# Patient Record
Sex: MALE | Race: WHITE | Employment: UNEMPLOYED | URBAN - METROPOLITAN AREA
[De-identification: names, ages, dates, MRNs, and addresses within clinical notes are randomized per-mention and may not be internally consistent; named-entity substitution may affect disease eponyms.]

---

## 2018-02-08 ENCOUNTER — HOSPITAL ENCOUNTER (OUTPATIENT)
Dept: OTHER | Age: 38
Discharge: HOME | End: 2018-02-08
Attending: NURSE PRACTITIONER | Admitting: NURSE PRACTITIONER

## 2018-02-08 DIAGNOSIS — R52 PAIN: ICD-10-CM

## 2019-01-25 ENCOUNTER — APPOINTMENT (OUTPATIENT)
Dept: GENERAL RADIOLOGY | Age: 39
End: 2019-01-25

## 2019-01-25 ENCOUNTER — HOSPITAL ENCOUNTER (EMERGENCY)
Age: 39
Discharge: LEFT BEFORE DISCHARGE | End: 2019-01-25
Attending: EMERGENCY MEDICINE

## 2019-01-25 VITALS
DIASTOLIC BLOOD PRESSURE: 72 MMHG | SYSTOLIC BLOOD PRESSURE: 116 MMHG | OXYGEN SATURATION: 100 % | HEART RATE: 65 BPM | TEMPERATURE: 98.5 F | RESPIRATION RATE: 18 BRPM | WEIGHT: 150 LBS | BODY MASS INDEX: 22.81 KG/M2

## 2019-01-25 DIAGNOSIS — T18.9XXA FOREIGN BODY IN DIGESTIVE TRACT, INITIAL ENCOUNTER: Primary | ICD-10-CM

## 2019-01-25 LAB
A/G RATIO: 1.6 (ref 1.1–2.2)
ALBUMIN SERPL-MCNC: 4.5 G/DL (ref 3.4–5)
ALP BLD-CCNC: 82 U/L (ref 40–129)
ALT SERPL-CCNC: 19 U/L (ref 10–40)
ANION GAP SERPL CALCULATED.3IONS-SCNC: 10 MMOL/L (ref 3–16)
AST SERPL-CCNC: 17 U/L (ref 15–37)
BASOPHILS ABSOLUTE: 0.1 K/UL (ref 0–0.2)
BASOPHILS RELATIVE PERCENT: 0.8 %
BILIRUB SERPL-MCNC: 0.5 MG/DL (ref 0–1)
BUN BLDV-MCNC: 12 MG/DL (ref 7–20)
CALCIUM SERPL-MCNC: 9.4 MG/DL (ref 8.3–10.6)
CHLORIDE BLD-SCNC: 105 MMOL/L (ref 99–110)
CO2: 26 MMOL/L (ref 21–32)
CREAT SERPL-MCNC: 1 MG/DL (ref 0.9–1.3)
EKG ATRIAL RATE: 60 BPM
EKG DIAGNOSIS: NORMAL
EKG P AXIS: 83 DEGREES
EKG P-R INTERVAL: 158 MS
EKG Q-T INTERVAL: 420 MS
EKG QRS DURATION: 90 MS
EKG QTC CALCULATION (BAZETT): 420 MS
EKG R AXIS: 42 DEGREES
EKG T AXIS: 75 DEGREES
EKG VENTRICULAR RATE: 60 BPM
EOSINOPHILS ABSOLUTE: 0 K/UL (ref 0–0.6)
EOSINOPHILS RELATIVE PERCENT: 0.6 %
GFR AFRICAN AMERICAN: >60
GFR NON-AFRICAN AMERICAN: >60
GLOBULIN: 2.8 G/DL
GLUCOSE BLD-MCNC: 89 MG/DL (ref 70–99)
HCT VFR BLD CALC: 42.4 % (ref 40.5–52.5)
HEMOGLOBIN: 14.3 G/DL (ref 13.5–17.5)
LYMPHOCYTES ABSOLUTE: 1.8 K/UL (ref 1–5.1)
LYMPHOCYTES RELATIVE PERCENT: 30.1 %
MCH RBC QN AUTO: 30.7 PG (ref 26–34)
MCHC RBC AUTO-ENTMCNC: 33.6 G/DL (ref 31–36)
MCV RBC AUTO: 91.4 FL (ref 80–100)
MONOCYTES ABSOLUTE: 0.4 K/UL (ref 0–1.3)
MONOCYTES RELATIVE PERCENT: 6.3 %
NEUTROPHILS ABSOLUTE: 3.8 K/UL (ref 1.7–7.7)
NEUTROPHILS RELATIVE PERCENT: 62.2 %
PDW BLD-RTO: 14.3 % (ref 12.4–15.4)
PLATELET # BLD: 241 K/UL (ref 135–450)
PMV BLD AUTO: 7.7 FL (ref 5–10.5)
POTASSIUM REFLEX MAGNESIUM: 4.4 MMOL/L (ref 3.5–5.1)
RBC # BLD: 4.65 M/UL (ref 4.2–5.9)
SODIUM BLD-SCNC: 141 MMOL/L (ref 136–145)
TOTAL PROTEIN: 7.3 G/DL (ref 6.4–8.2)
WBC # BLD: 6.1 K/UL (ref 4–11)

## 2019-01-25 PROCEDURE — 99284 EMERGENCY DEPT VISIT MOD MDM: CPT

## 2019-01-25 PROCEDURE — 74022 RADEX COMPL AQT ABD SERIES: CPT

## 2019-01-25 PROCEDURE — 93005 ELECTROCARDIOGRAM TRACING: CPT | Performed by: EMERGENCY MEDICINE

## 2019-01-25 PROCEDURE — 93010 ELECTROCARDIOGRAM REPORT: CPT | Performed by: INTERNAL MEDICINE

## 2019-01-25 PROCEDURE — 85025 COMPLETE CBC W/AUTO DIFF WBC: CPT

## 2019-01-25 PROCEDURE — 80053 COMPREHEN METABOLIC PANEL: CPT

## 2019-01-25 ASSESSMENT — PAIN SCALES - GENERAL: PAINLEVEL_OUTOF10: 4

## 2019-01-25 ASSESSMENT — PAIN DESCRIPTION - LOCATION: LOCATION: RIB CAGE

## 2019-01-25 ASSESSMENT — PAIN DESCRIPTION - ORIENTATION: ORIENTATION: LEFT

## 2019-05-08 ENCOUNTER — APPOINTMENT (OUTPATIENT)
Dept: GENERAL RADIOLOGY | Age: 39
End: 2019-05-08

## 2019-05-08 ENCOUNTER — HOSPITAL ENCOUNTER (EMERGENCY)
Age: 39
Discharge: HOME | End: 2019-05-09
Attending: EMERGENCY MEDICINE

## 2019-05-08 DIAGNOSIS — R07.9 CHEST PAIN, UNSPECIFIED TYPE: Primary | ICD-10-CM

## 2019-05-08 LAB
A/G RATIO: 1.3 (ref 1.1–2.2)
ALBUMIN SERPL-MCNC: 4.3 G/DL (ref 3.4–5)
ALP BLD-CCNC: 90 U/L (ref 40–129)
ALT SERPL-CCNC: 23 U/L (ref 10–40)
ANION GAP SERPL CALCULATED.3IONS-SCNC: 11 MMOL/L (ref 3–16)
AST SERPL-CCNC: 22 U/L (ref 15–37)
BASOPHILS ABSOLUTE: 0.1 K/UL (ref 0–0.2)
BASOPHILS RELATIVE PERCENT: 0.7 %
BILIRUB SERPL-MCNC: 0.4 MG/DL (ref 0–1)
BUN BLDV-MCNC: 16 MG/DL (ref 7–20)
CALCIUM SERPL-MCNC: 9.1 MG/DL (ref 8.3–10.6)
CHLORIDE BLD-SCNC: 103 MMOL/L (ref 99–110)
CO2: 26 MMOL/L (ref 21–32)
CREAT SERPL-MCNC: 0.9 MG/DL (ref 0.9–1.3)
EOSINOPHILS ABSOLUTE: 0.1 K/UL (ref 0–0.6)
EOSINOPHILS RELATIVE PERCENT: 0.7 %
GFR AFRICAN AMERICAN: >60
GFR NON-AFRICAN AMERICAN: >60
GLOBULIN: 3.3 G/DL
GLUCOSE BLD-MCNC: 103 MG/DL (ref 70–99)
HCT VFR BLD CALC: 39.2 % (ref 40.5–52.5)
HEMOGLOBIN: 13.4 G/DL (ref 13.5–17.5)
LYMPHOCYTES ABSOLUTE: 1.8 K/UL (ref 1–5.1)
LYMPHOCYTES RELATIVE PERCENT: 16.5 %
MCH RBC QN AUTO: 31.2 PG (ref 26–34)
MCHC RBC AUTO-ENTMCNC: 34.1 G/DL (ref 31–36)
MCV RBC AUTO: 91.4 FL (ref 80–100)
MONOCYTES ABSOLUTE: 0.8 K/UL (ref 0–1.3)
MONOCYTES RELATIVE PERCENT: 7.8 %
NEUTROPHILS ABSOLUTE: 8 K/UL (ref 1.7–7.7)
NEUTROPHILS RELATIVE PERCENT: 74.3 %
PDW BLD-RTO: 13.6 % (ref 12.4–15.4)
PLATELET # BLD: 389 K/UL (ref 135–450)
PMV BLD AUTO: 7.3 FL (ref 5–10.5)
POTASSIUM SERPL-SCNC: 3.6 MMOL/L (ref 3.5–5.1)
RBC # BLD: 4.29 M/UL (ref 4.2–5.9)
SODIUM BLD-SCNC: 140 MMOL/L (ref 136–145)
TOTAL PROTEIN: 7.6 G/DL (ref 6.4–8.2)
TROPONIN: <0.01 NG/ML
WBC # BLD: 10.8 K/UL (ref 4–11)

## 2019-05-08 PROCEDURE — 36415 COLL VENOUS BLD VENIPUNCTURE: CPT

## 2019-05-08 PROCEDURE — 99285 EMERGENCY DEPT VISIT HI MDM: CPT

## 2019-05-08 PROCEDURE — 80053 COMPREHEN METABOLIC PANEL: CPT

## 2019-05-08 PROCEDURE — 93005 ELECTROCARDIOGRAM TRACING: CPT | Performed by: EMERGENCY MEDICINE

## 2019-05-08 PROCEDURE — G0480 DRUG TEST DEF 1-7 CLASSES: HCPCS

## 2019-05-08 PROCEDURE — 85025 COMPLETE CBC W/AUTO DIFF WBC: CPT

## 2019-05-08 PROCEDURE — 84484 ASSAY OF TROPONIN QUANT: CPT

## 2019-05-08 PROCEDURE — 71045 X-RAY EXAM CHEST 1 VIEW: CPT

## 2019-05-08 ASSESSMENT — PAIN SCALES - GENERAL: PAINLEVEL_OUTOF10: 5

## 2019-05-08 ASSESSMENT — PAIN DESCRIPTION - LOCATION: LOCATION: CHEST

## 2019-05-08 ASSESSMENT — PAIN DESCRIPTION - PAIN TYPE: TYPE: ACUTE PAIN

## 2019-05-09 ENCOUNTER — APPOINTMENT (OUTPATIENT)
Dept: CT IMAGING | Age: 39
End: 2019-05-09

## 2019-05-09 VITALS
HEIGHT: 69 IN | HEART RATE: 75 BPM | BODY MASS INDEX: 22.22 KG/M2 | WEIGHT: 150 LBS | RESPIRATION RATE: 14 BRPM | TEMPERATURE: 98.7 F | OXYGEN SATURATION: 99 % | SYSTOLIC BLOOD PRESSURE: 123 MMHG | DIASTOLIC BLOOD PRESSURE: 91 MMHG

## 2019-05-09 LAB
AMPHETAMINE SCREEN, URINE: POSITIVE
BARBITURATE SCREEN URINE: ABNORMAL
BENZODIAZEPINE SCREEN, URINE: ABNORMAL
CANNABINOID SCREEN URINE: ABNORMAL
COCAINE METABOLITE SCREEN URINE: ABNORMAL
EKG ATRIAL RATE: 84 BPM
EKG DIAGNOSIS: NORMAL
EKG P AXIS: 75 DEGREES
EKG P-R INTERVAL: 148 MS
EKG Q-T INTERVAL: 382 MS
EKG QRS DURATION: 92 MS
EKG QTC CALCULATION (BAZETT): 451 MS
EKG R AXIS: 38 DEGREES
EKG T AXIS: 67 DEGREES
EKG VENTRICULAR RATE: 84 BPM
ETHANOL: NORMAL MG/DL (ref 0–0.08)
Lab: ABNORMAL
METHADONE SCREEN, URINE: ABNORMAL
OPIATE SCREEN URINE: ABNORMAL
OXYCODONE URINE: ABNORMAL
PH UA: 7
PHENCYCLIDINE SCREEN URINE: ABNORMAL
PROPOXYPHENE SCREEN: ABNORMAL

## 2019-05-09 PROCEDURE — 80307 DRUG TEST PRSMV CHEM ANLYZR: CPT

## 2019-05-09 PROCEDURE — 6370000000 HC RX 637 (ALT 250 FOR IP)

## 2019-05-09 PROCEDURE — 93010 ELECTROCARDIOGRAM REPORT: CPT | Performed by: INTERNAL MEDICINE

## 2019-05-09 PROCEDURE — 96374 THER/PROPH/DIAG INJ IV PUSH: CPT

## 2019-05-09 PROCEDURE — 70450 CT HEAD/BRAIN W/O DYE: CPT

## 2019-05-09 PROCEDURE — 6360000002 HC RX W HCPCS: Performed by: EMERGENCY MEDICINE

## 2019-05-09 RX ORDER — NALOXONE HYDROCHLORIDE 1 MG/ML
1 INJECTION INTRAMUSCULAR; INTRAVENOUS; SUBCUTANEOUS ONCE
Status: DISCONTINUED | OUTPATIENT
Start: 2019-05-09 | End: 2019-05-09

## 2019-05-09 RX ORDER — NALOXONE HYDROCHLORIDE 1 MG/ML
1 INJECTION INTRAMUSCULAR; INTRAVENOUS; SUBCUTANEOUS ONCE
Status: COMPLETED | OUTPATIENT
Start: 2019-05-09 | End: 2019-05-09

## 2019-05-09 RX ORDER — NALOXONE HYDROCHLORIDE 1 MG/ML
INJECTION INTRAMUSCULAR; INTRAVENOUS; SUBCUTANEOUS
Status: DISCONTINUED
Start: 2019-05-09 | End: 2019-05-09

## 2019-05-09 RX ORDER — AMMONIA INHALANTS 0.04 G/.3ML
0.3 INHALANT RESPIRATORY (INHALATION) ONCE
Status: COMPLETED | OUTPATIENT
Start: 2019-05-09 | End: 2019-05-09

## 2019-05-09 RX ORDER — AMMONIA INHALANTS 0.04 G/.3ML
INHALANT RESPIRATORY (INHALATION)
Status: COMPLETED
Start: 2019-05-09 | End: 2019-05-09

## 2019-05-09 RX ADMIN — NALOXONE HYDROCHLORIDE 1 MG: 1 INJECTION PARENTERAL at 00:52

## 2019-05-09 RX ADMIN — Medication 0.3 ML: at 04:22

## 2019-05-09 RX ADMIN — AMMONIA INHALANTS 0.3 ML: 0.04 INHALANT RESPIRATORY (INHALATION) at 04:22

## 2019-05-09 NOTE — ED NOTES
Patient sleeping. Ask how he is getting home, patient reports kids will come get him. Asked for a number patient closed his eyes. Patient mumbles answer and closes eyes.       [facility]nn Fel[facility]  05/09/19 0610

## 2019-05-09 NOTE — ED PROVIDER NOTES
eMERGENCY dEPARTMENT eNCOUnter        Pt Name: Elmo Samaniego  MRN: [id number]  [place] 1980  Date of evaluation: 5/8/2019  Provider: JORDY Sosa  PCP: Paul Swain MD  ED Attending: Agustina Carey., MD    History provided by the patient. CHIEF COMPLAINT:     Chief Complaint   Patient presents with    Chest Pain     starting tonight, states fell 3 days ago off 4 foot ladder and now has bad headache     Headache       HISTORY OF PRESENT ILLNESS:      Elmo Samaniego is a 34 y.o. male who presents [address] [facility]  ED with complaints of chest pain or headache. Patient states that his chest pain started yesterday and has been persistent. Patient also states that he fell and hit his head and is having a headache. Patient denies any nausea or vomiting. Patient is lethargic, he has normal vital signs, no hypoxia. He denies any illicit drug use, denies alcohol use. He denies any shortness of breath. Denies abdominal pain, nausea or vomiting. He does not appear to be acutely distressed. Denies any cardiac history. He is here for further evaluation. Nursing Notes were reviewed     REVIEW OF SYSTEMS:     Review of Systems  All systems, atotal of 10, are reviewed and negative except for those that were just noted in history present illness. PAST MEDICAL HISTORY:     Past Medical History:   Diagnosis Date    Back pain     GERD (gastroesophageal reflux disease)     Hepatitis C     Wears glasses          SURGICAL HISTORY:      Past Surgical History:   Procedure Laterality Date    APPENDECTOMY      SHOULDER SURGERY Left 4/1/14    UPPER GASTROINTESTINAL ENDOSCOPY  2014         CURRENT MEDICATIONS:       Previous Medications    No medications on file         ALLERGIES:    Patient has no known allergies.     FAMILY HISTORY:       Family History   Problem Relation Age of Onset    Cancer Mother     Heart Disease Father HENT: Normocephalic. Atraumatic. External ears normal, without discharge. TMs clear bilaterally. No nasal discharge. Oropharynx clear, no erythema. Mucous membranes moist.  EYES: Conjunctiva non-injected, no lid abnormalities noted. No scleral icterus. PERRL. EOM's grossly intact. Anterior chambers clear. NECK: Supple. Normal ROM. No meningismus. No thyroid tenderness or swelling noted. CARDIOVASCULAR: RRR. No Murmer. PULMONARY/CHEST WALL: Effort normal. No tachypnea. Lungs clear to ausculation. ABDOMEN: Normal BS. Soft. Nondistended. No tenderness to palpate. No guarding. No hernias noted. No splenomegaly. /ANORECTAL: Not assessed  MUSKULOSKELETAL: Normal ROM. No acute deformities. No edema. No tenderness to palpate. SKIN: Warm and dry. NEUROLOGICAL:  GCS 15. CN II-XII grossly intact. Strength is 5/5 in allextremities and sensation is intact. PSYCHIATRIC: lethargic but easily arousable. DIAGNOSTIC RESULTS:     LABS:    Results for orders placed or performed during the hospital encounter of 05/08/19   CBC Auto Differential   Result Value Ref Range    WBC 10.8 4.0 - 11.0 K/uL    RBC 4.29 4. 20 - 5.90 M/uL    Hemoglobin 13.4 (L) 13.5 - 17.5 g/dL    Hematocrit 39.2 (L) 40.5 - 52.5 %    MCV 91.4 80.0 - 100.0 fL    MCH 31.2 26.0 - 34.0 pg    MCHC 34.1 31.0 - 36.0 g/dL    RDW 13.6 12.4 - 15.4 %    Platelets 264 164 - 461 K/uL    MPV 7.3 5.0 - 10.5 fL    Neutrophils % 74.3 %    Lymphocytes % 16.5 %    Monocytes % 7.8 %    Eosinophils % 0.7 %    Basophils % 0.7 %    Neutrophils # 8.0 (H) 1.7 - 7.7 K/uL    Lymphocytes # 1.8 1.0 - 5.1 K/uL    Monocytes # 0.8 0.0 - 1.3 K/uL    Eosinophils # 0.1 0.0 - 0.6 K/uL    Basophils # 0.1 0.0 - 0.2 K/uL   Comprehensive Metabolic Panel   Result Value Ref Range    Sodium 140 136 - 145 mmol/L    Potassium 3.6 3.5 - 5.1 mmol/L    Chloride 103 99 - 110 mmol/L    CO2 26 21 - 32 mmol/L    Anion Gap 11 3 - 16    Glucose 103 (H) 70 - 99 mg/dL    BUN 16 7 - 20 mg/dL CREATININE 0.9 0.9 - 1.3 mg/dL    GFR Non-African American >60 >60    GFR African American >60 >60    Calcium 9.1 8.3 - 10.6 mg/dL    Total Protein 7.6 6.4 - 8.2 g/dL    Alb 4.3 3.4 - 5.0 g/dL    Albumin/Globulin Ratio 1.3 1.1 - 2.2    Total Bilirubin 0.4 0.0 - 1.0 mg/dL    Alkaline Phosphatase 90 40 - 129 U/L    ALT 23 10 - 40 U/L    AST 22 15 - 37 U/L    Globulin 3.3 g/dL   Troponin   Result Value Ref Range    Troponin <0.01 <0.01 ng/mL   Ethanol   Result Value Ref Range    Ethanol Lvl None Detected mg/dL         RADIOLOGY:  All x-ray studies are viewed/reviewedby me. Formal interpretations per the radiologist are as follows:      CT HEAD WO CONTRAST   Final Result   No acute intracranial abnormality. XR CHEST PORTABLE   Final Result   Negative portable chest.                 EKG:  See EKG interpretation by an attending phsyician      PROCEDURES:   N/A    CRITICAL CARE TIME:   N/A    CONSULTS:  None      EMERGENCYDEPARTMENT COURSE and DIFFERENTIAL DIAGNOSIS/MDM:   Vitals:    Vitals:    05/08/19 2333 05/09/19 0005 05/09/19 0036 05/09/19 0053   BP:   134/89 134/89   Pulse: 76 67 73 90   Resp: 19 20 19 18   Temp:       TempSrc:       SpO2: 97% 99% 100% 100%   Weight:       Height:           Patient was given the following medications:  Medications   naloxone (NARCAN) injection 1 mg (1 mg Intravenous Given 5/9/19 0052)         Patient was evaluated by both myself and Lennox Lepe MD.   ED Course as of May 09 0228   Wed May 08, 2019   2350 he was evaluated at the bedside. Patient was asleep, he is difficult to arouse, oriented to arouse him he is not communicating well. Pulse treatment since. Patient denies any drug use or alcohol abuse. Patient states that he has chest pain that started last night. Patient again however falls asleep mid sentence. Patient does appear somewhat diaphoretic. Patient laboratory studies showed normal white blood cell count elevation, troponin pending.   The CT and a urine drug screen as well as an EtOH to try to explain patient's presentation. I will continue to monitor.    [RH]   u May 09, 2019   0051 Nurse wanted to give the patient narcan however there is no medical reason for the patient to need it. He is drowsy but arousable. He has perfect vital signs and has no hypoxia. Patient nurse then went to attending physician because she wanted to give narcan and attending physician gave the order. I do not feel this is necessary at this time. [RH]   0148 Patient CT negative, EtOH negative. Labs otherwise unremarkable. Vital signs are stable, so no indications for further evaluation of this patient. He was discharged home. He can return the ED for any worsening symptoms. I saw no indications of an acute emergent medical condition at this time    [RH]      ED Course User Index  [RH] iSngh Machado, APRN - CNP        Patient laboratory studies, radiographic imaging, andassessment were all discussed with the patient and/or patient family. There was shared decision-making between myself, the attending physician, as well as the patient and/or their surrogate and we are all in agreement with discharge home. There was an opportunity for questions and all questions were answered to the best of my ability and to the satisfaction of the patient and/or patient family. I estimate there is LOW risk for PULMONARY EMBOLISM, ACUTE CORONARY SYNDROME, OR THORACIC AORTIC DISSECTION, thus I consider the discharge disposition reasonable. Lorenzo Momin and I have discussed the diagnosis and risks, and we agree with discharging home to follow-up with their primary doctor. We also discussed returning to the Emergency Department immediately if new or worsening symptoms occur. We have discussed the symptoms which are most concerning (e.g., bloody sputum, fever, worsening pain or shortness of breath, vomiting) that necessitate immediate return. FINAL IMPRESSION:      1.  Chest pain, unspecified type          DISPOSITION/PLAN:   DISPOSITIONDecision To Discharge      PATIENT REFERRED TO:  Ricky Bone MD  [address] [facility] [address] [phone]    Call   For follow up      DISCHARGE MEDICATIONS:  New Prescriptions    No medications on file                  (Please note that portions of this note were completed with a voice recognition program.  Efforts were made to edit the dictations, but occasionally words are mis-transcribed.)    JORDY Carter CNP-C (electronically signed)        JORDY Carter CNP  05/09/19 7669

## 2019-05-09 NOTE — ED NOTES
Oumou Hewitt MD verbal order to administer Narcan to patient.       Sharmaine Grey, [facility]  05/09/19 2117

## 2019-05-09 NOTE — ED NOTES
[facility] Department picked patient up due to several outstanding warrents. Patient was read his rights and placed under arrest. Patient was alert and oriented. Patient was allowed to dress and urinate at bedside. Patient then was handcuffed and lead to the cruiser.       Robina Coulter, [address] [place] [address]  05/09/19 1139

## 2019-05-09 NOTE — ED NOTES
Patient sleeping, woke patient and asked again for urine for UA.  Patient has bedside urinal in bed with him     Sandra Church, [facility]  05/09/19 8422

## 2019-05-09 NOTE — ED PROVIDER NOTES
Patient initially seen by Annmarie BARRIOS in PE and I was involved in all diagnostic treatment and disposition decisions and did an independent exam.  Patient presents complaining of chest pain. He states that he was turning himself in for heroin use and fell out of the car tonight bumping his head. He did not have loss of consciousness and has abrasions on his forehead. He does not have a history of heart disease and is not diabetic or hypercholesterolemic. He does not have a history of hypertension but does smoke cigarettes. His never been to treatment for heroin use but has been to Narcotics Anonymous in the past but did not get a sponsor work 12 step program.  His EKG did not show any acute changes his labs were not diagnostically abnormal and his head CT and chest x-ray were negative. He'll be treated as an outpatient we will follow him up with cardiology for consideration of a stress test and cardiac echo. I strongly encouraged him to follow-up with Narcotics Anonymous and the interim he is to return to the emergency department if problems develop like worsening chest pain shortness of breath or other concerning symptoms.     EKG: EKG demonstrates normal sinus rhythm with normal axis normal intervals normal ST-T wave segments and no evidence of ischemia infarction or arrhythmia heart rate of 84 and much like his previous EKG     Myriam Walsh MD  05/09/19 8335

## 2019-05-09 NOTE — ED NOTES
Patient sleeping in bed. Woke patient and asked if having any pain at this time. Patient denies pain. Asked for urine for UA and Patient attempting to comply with request at this time.       Alphonso Fenton RN  05/09/19 0025

## 2019-05-09 NOTE — ED NOTES
Spoke to [place], [place] in department who discontinued Narcan.      [facility]  05/09/19 6228

## 2019-05-09 NOTE — ED NOTES
Patient unable to stay awake. Provider verbal order of ammonia inhaler.       Una Montesinos [facility]  05/09/19 0355

## 2019-12-05 ENCOUNTER — APPOINTMENT (OUTPATIENT)
Dept: GENERAL RADIOLOGY | Age: 39
DRG: 917 | End: 2019-12-05
Payer: COMMERCIAL

## 2019-12-05 ENCOUNTER — HOSPITAL ENCOUNTER (INPATIENT)
Age: 39
LOS: 2 days | Discharge: PSYCHIATRIC OR SUBSTANCE USE FACILITY | DRG: 917 | End: 2019-12-07
Attending: EMERGENCY MEDICINE | Admitting: INTERNAL MEDICINE
Payer: COMMERCIAL

## 2019-12-05 ENCOUNTER — APPOINTMENT (OUTPATIENT)
Dept: CT IMAGING | Age: 39
DRG: 917 | End: 2019-12-05
Payer: COMMERCIAL

## 2019-12-05 PROBLEM — J96.00 ACUTE RESPIRATORY FAILURE (HCC): Status: ACTIVE | Noted: 2019-12-05

## 2019-12-05 PROBLEM — F19.90 IVDU (INTRAVENOUS DRUG USER): Status: ACTIVE | Noted: 2019-12-05

## 2019-12-05 PROBLEM — G93.41 ACUTE METABOLIC ENCEPHALOPATHY: Status: ACTIVE | Noted: 2019-12-05

## 2019-12-05 LAB
A/G RATIO: 1.3 (ref 1.1–2.2)
ACETAMINOPHEN LEVEL: 10 UG/ML (ref 10–30)
ACETAMINOPHEN LEVEL: <5 UG/ML (ref 10–30)
ALBUMIN SERPL-MCNC: 4.8 G/DL (ref 3.4–5)
ALP BLD-CCNC: 80 U/L (ref 40–129)
ALT SERPL-CCNC: 34 U/L (ref 10–40)
AMPHETAMINE SCREEN, URINE: POSITIVE
ANION GAP SERPL CALCULATED.3IONS-SCNC: 14 MMOL/L (ref 3–16)
ANION GAP SERPL CALCULATED.3IONS-SCNC: 7 MMOL/L (ref 3–16)
ANION GAP SERPL CALCULATED.3IONS-SCNC: 8 MMOL/L (ref 3–16)
AST SERPL-CCNC: 36 U/L (ref 15–37)
BARBITURATE SCREEN URINE: ABNORMAL
BASE EXCESS ARTERIAL: 1.5 MMOL/L (ref -3–3)
BASOPHILS ABSOLUTE: 0.1 K/UL (ref 0–0.2)
BASOPHILS RELATIVE PERCENT: 0.6 %
BENZODIAZEPINE SCREEN, URINE: ABNORMAL
BILIRUB SERPL-MCNC: 0.5 MG/DL (ref 0–1)
BILIRUBIN URINE: NEGATIVE
BLOOD, URINE: NEGATIVE
BUN BLDV-MCNC: 13 MG/DL (ref 7–20)
BUN BLDV-MCNC: 14 MG/DL (ref 7–20)
BUN BLDV-MCNC: 14 MG/DL (ref 7–20)
CALCIUM SERPL-MCNC: 8.5 MG/DL (ref 8.3–10.6)
CALCIUM SERPL-MCNC: 8.6 MG/DL (ref 8.3–10.6)
CALCIUM SERPL-MCNC: 9.7 MG/DL (ref 8.3–10.6)
CANNABINOID SCREEN URINE: POSITIVE
CARBOXYHEMOGLOBIN ARTERIAL: 3.1 % (ref 0–1.5)
CHLORIDE BLD-SCNC: 102 MMOL/L (ref 99–110)
CHLORIDE BLD-SCNC: 109 MMOL/L (ref 99–110)
CHLORIDE BLD-SCNC: 110 MMOL/L (ref 99–110)
CLARITY: CLEAR
CO2: 24 MMOL/L (ref 21–32)
COCAINE METABOLITE SCREEN URINE: ABNORMAL
COLOR: YELLOW
CREAT SERPL-MCNC: 1 MG/DL (ref 0.9–1.3)
CREAT SERPL-MCNC: 1.2 MG/DL (ref 0.9–1.3)
CREAT SERPL-MCNC: 1.2 MG/DL (ref 0.9–1.3)
EKG ATRIAL RATE: 80 BPM
EKG ATRIAL RATE: 97 BPM
EKG DIAGNOSIS: NORMAL
EKG DIAGNOSIS: NORMAL
EKG P AXIS: 73 DEGREES
EKG P AXIS: 76 DEGREES
EKG P-R INTERVAL: 142 MS
EKG P-R INTERVAL: 164 MS
EKG Q-T INTERVAL: 406 MS
EKG Q-T INTERVAL: 428 MS
EKG QRS DURATION: 90 MS
EKG QRS DURATION: 94 MS
EKG QTC CALCULATION (BAZETT): 493 MS
EKG QTC CALCULATION (BAZETT): 515 MS
EKG R AXIS: 40 DEGREES
EKG R AXIS: 7 DEGREES
EKG T AXIS: 101 DEGREES
EKG T AXIS: 68 DEGREES
EKG VENTRICULAR RATE: 80 BPM
EKG VENTRICULAR RATE: 97 BPM
EOSINOPHILS ABSOLUTE: 0.1 K/UL (ref 0–0.6)
EOSINOPHILS RELATIVE PERCENT: 1 %
ETHANOL: NORMAL MG/DL (ref 0–0.08)
GFR AFRICAN AMERICAN: >60
GFR NON-AFRICAN AMERICAN: >60
GLOBULIN: 3.7 G/DL
GLUCOSE BLD-MCNC: 100 MG/DL (ref 70–99)
GLUCOSE BLD-MCNC: 76 MG/DL (ref 70–99)
GLUCOSE BLD-MCNC: 89 MG/DL (ref 70–99)
GLUCOSE URINE: NEGATIVE MG/DL
HCO3 ARTERIAL: 24.6 MMOL/L (ref 21–29)
HCT VFR BLD CALC: 50.8 % (ref 40.5–52.5)
HEMOGLOBIN, ART, EXTENDED: 15 G/DL (ref 13.5–17.5)
HEMOGLOBIN: 16.7 G/DL (ref 13.5–17.5)
KETONES, URINE: NEGATIVE MG/DL
LEUKOCYTE ESTERASE, URINE: NEGATIVE
LYMPHOCYTES ABSOLUTE: 2 K/UL (ref 1–5.1)
LYMPHOCYTES RELATIVE PERCENT: 16 %
Lab: ABNORMAL
MAGNESIUM: 2.3 MG/DL (ref 1.8–2.4)
MCH RBC QN AUTO: 31.4 PG (ref 26–34)
MCHC RBC AUTO-ENTMCNC: 32.8 G/DL (ref 31–36)
MCV RBC AUTO: 95.8 FL (ref 80–100)
METHADONE SCREEN, URINE: ABNORMAL
METHEMOGLOBIN ARTERIAL: 0.2 %
MICROSCOPIC EXAMINATION: NORMAL
MONOCYTES ABSOLUTE: 0.7 K/UL (ref 0–1.3)
MONOCYTES RELATIVE PERCENT: 5.9 %
NEUTROPHILS ABSOLUTE: 9.7 K/UL (ref 1.7–7.7)
NEUTROPHILS RELATIVE PERCENT: 76.5 %
NITRITE, URINE: NEGATIVE
O2 CONTENT ARTERIAL: 21 ML/DL
O2 SAT, ARTERIAL: 98.4 %
O2 THERAPY: ABNORMAL
OPIATE SCREEN URINE: ABNORMAL
OXYCODONE URINE: ABNORMAL
PCO2 ARTERIAL: 34.7 MMHG (ref 35–45)
PDW BLD-RTO: 13.1 % (ref 12.4–15.4)
PH ARTERIAL: 7.47 (ref 7.35–7.45)
PH UA: 7.5
PH UA: 7.5 (ref 5–8)
PHENCYCLIDINE SCREEN URINE: ABNORMAL
PLATELET # BLD: 288 K/UL (ref 135–450)
PMV BLD AUTO: 8.2 FL (ref 5–10.5)
PO2 ARTERIAL: 111.7 MMHG (ref 75–108)
POTASSIUM REFLEX MAGNESIUM: 3.4 MMOL/L (ref 3.5–5.1)
POTASSIUM SERPL-SCNC: 4.6 MMOL/L (ref 3.5–5.1)
POTASSIUM SERPL-SCNC: 5 MMOL/L (ref 3.5–5.1)
PROPOXYPHENE SCREEN: ABNORMAL
PROTEIN UA: NEGATIVE MG/DL
RBC # BLD: 5.3 M/UL (ref 4.2–5.9)
SALICYLATE, SERUM: 4.3 MG/DL (ref 15–30)
SALICYLATE, SERUM: 9.1 MG/DL (ref 15–30)
SODIUM BLD-SCNC: 140 MMOL/L (ref 136–145)
SODIUM BLD-SCNC: 141 MMOL/L (ref 136–145)
SODIUM BLD-SCNC: 141 MMOL/L (ref 136–145)
SPECIFIC GRAVITY UA: 1.01 (ref 1–1.03)
TCO2 ARTERIAL: 25.7 MMOL/L
TOTAL CK: 206 U/L (ref 39–308)
TOTAL CK: 326 U/L (ref 39–308)
TOTAL PROTEIN: 8.5 G/DL (ref 6.4–8.2)
TROPONIN: 0.02 NG/ML
URINE TYPE: NORMAL
UROBILINOGEN, URINE: 0.2 E.U./DL
WBC # BLD: 12.6 K/UL (ref 4–11)

## 2019-12-05 PROCEDURE — 2500000003 HC RX 250 WO HCPCS: Performed by: INTERNAL MEDICINE

## 2019-12-05 PROCEDURE — 72170 X-RAY EXAM OF PELVIS: CPT

## 2019-12-05 PROCEDURE — G0480 DRUG TEST DEF 1-7 CLASSES: HCPCS

## 2019-12-05 PROCEDURE — 96365 THER/PROPH/DIAG IV INF INIT: CPT

## 2019-12-05 PROCEDURE — 2580000003 HC RX 258: Performed by: INTERNAL MEDICINE

## 2019-12-05 PROCEDURE — 82803 BLOOD GASES ANY COMBINATION: CPT

## 2019-12-05 PROCEDURE — 87086 URINE CULTURE/COLONY COUNT: CPT

## 2019-12-05 PROCEDURE — 80307 DRUG TEST PRSMV CHEM ANLYZR: CPT

## 2019-12-05 PROCEDURE — 96375 TX/PRO/DX INJ NEW DRUG ADDON: CPT

## 2019-12-05 PROCEDURE — 85025 COMPLETE CBC W/AUTO DIFF WBC: CPT

## 2019-12-05 PROCEDURE — 94750 HC PULMONARY COMPLIANCE STUDY: CPT

## 2019-12-05 PROCEDURE — 93010 ELECTROCARDIOGRAM REPORT: CPT | Performed by: INTERNAL MEDICINE

## 2019-12-05 PROCEDURE — 96361 HYDRATE IV INFUSION ADD-ON: CPT

## 2019-12-05 PROCEDURE — 2000000000 HC ICU R&B

## 2019-12-05 PROCEDURE — 82550 ASSAY OF CK (CPK): CPT

## 2019-12-05 PROCEDURE — 71045 X-RAY EXAM CHEST 1 VIEW: CPT

## 2019-12-05 PROCEDURE — 36415 COLL VENOUS BLD VENIPUNCTURE: CPT

## 2019-12-05 PROCEDURE — 2580000003 HC RX 258: Performed by: EMERGENCY MEDICINE

## 2019-12-05 PROCEDURE — 83735 ASSAY OF MAGNESIUM: CPT

## 2019-12-05 PROCEDURE — 80053 COMPREHEN METABOLIC PANEL: CPT

## 2019-12-05 PROCEDURE — 6370000000 HC RX 637 (ALT 250 FOR IP): Performed by: INTERNAL MEDICINE

## 2019-12-05 PROCEDURE — 2500000003 HC RX 250 WO HCPCS

## 2019-12-05 PROCEDURE — 6360000002 HC RX W HCPCS: Performed by: EMERGENCY MEDICINE

## 2019-12-05 PROCEDURE — 36592 COLLECT BLOOD FROM PICC: CPT

## 2019-12-05 PROCEDURE — 6360000002 HC RX W HCPCS: Performed by: INTERNAL MEDICINE

## 2019-12-05 PROCEDURE — 2500000003 HC RX 250 WO HCPCS: Performed by: EMERGENCY MEDICINE

## 2019-12-05 PROCEDURE — 99291 CRITICAL CARE FIRST HOUR: CPT | Performed by: INTERNAL MEDICINE

## 2019-12-05 PROCEDURE — 94002 VENT MGMT INPAT INIT DAY: CPT

## 2019-12-05 PROCEDURE — 70450 CT HEAD/BRAIN W/O DYE: CPT

## 2019-12-05 PROCEDURE — 93005 ELECTROCARDIOGRAM TRACING: CPT | Performed by: EMERGENCY MEDICINE

## 2019-12-05 PROCEDURE — 93005 ELECTROCARDIOGRAM TRACING: CPT | Performed by: INTERNAL MEDICINE

## 2019-12-05 PROCEDURE — 6360000002 HC RX W HCPCS

## 2019-12-05 PROCEDURE — 99291 CRITICAL CARE FIRST HOUR: CPT

## 2019-12-05 PROCEDURE — 02HV33Z INSERTION OF INFUSION DEVICE INTO SUPERIOR VENA CAVA, PERCUTANEOUS APPROACH: ICD-10-PCS | Performed by: EMERGENCY MEDICINE

## 2019-12-05 PROCEDURE — 0BH17EZ INSERTION OF ENDOTRACHEAL AIRWAY INTO TRACHEA, VIA NATURAL OR ARTIFICIAL OPENING: ICD-10-PCS | Performed by: INTERNAL MEDICINE

## 2019-12-05 PROCEDURE — 81003 URINALYSIS AUTO W/O SCOPE: CPT

## 2019-12-05 PROCEDURE — 99292 CRITICAL CARE ADDL 30 MIN: CPT

## 2019-12-05 PROCEDURE — 84484 ASSAY OF TROPONIN QUANT: CPT

## 2019-12-05 PROCEDURE — 5A1945Z RESPIRATORY VENTILATION, 24-96 CONSECUTIVE HOURS: ICD-10-PCS | Performed by: INTERNAL MEDICINE

## 2019-12-05 RX ORDER — MIDAZOLAM HYDROCHLORIDE 1 MG/ML
2 INJECTION INTRAMUSCULAR; INTRAVENOUS
Status: DISCONTINUED | OUTPATIENT
Start: 2019-12-05 | End: 2019-12-06

## 2019-12-05 RX ORDER — LORAZEPAM 2 MG/ML
2 INJECTION INTRAMUSCULAR ONCE
Status: COMPLETED | OUTPATIENT
Start: 2019-12-05 | End: 2019-12-05

## 2019-12-05 RX ORDER — FAMOTIDINE 20 MG/1
20 TABLET, FILM COATED ORAL 2 TIMES DAILY
Status: DISCONTINUED | OUTPATIENT
Start: 2019-12-05 | End: 2019-12-05

## 2019-12-05 RX ORDER — ROCURONIUM BROMIDE 10 MG/ML
80 INJECTION, SOLUTION INTRAVENOUS ONCE
Status: COMPLETED | OUTPATIENT
Start: 2019-12-05 | End: 2019-12-05

## 2019-12-05 RX ORDER — POTASSIUM CHLORIDE 750 MG/1
40 TABLET, EXTENDED RELEASE ORAL PRN
Status: DISCONTINUED | OUTPATIENT
Start: 2019-12-05 | End: 2019-12-07 | Stop reason: HOSPADM

## 2019-12-05 RX ORDER — CHLORHEXIDINE GLUCONATE 0.12 MG/ML
15 RINSE ORAL 2 TIMES DAILY
Status: DISCONTINUED | OUTPATIENT
Start: 2019-12-05 | End: 2019-12-06

## 2019-12-05 RX ORDER — PROPOFOL 10 MG/ML
10 INJECTION, EMULSION INTRAVENOUS ONCE
Status: COMPLETED | OUTPATIENT
Start: 2019-12-05 | End: 2019-12-05

## 2019-12-05 RX ORDER — 0.9 % SODIUM CHLORIDE 0.9 %
1000 INTRAVENOUS SOLUTION INTRAVENOUS ONCE
Status: COMPLETED | OUTPATIENT
Start: 2019-12-05 | End: 2019-12-05

## 2019-12-05 RX ORDER — PROPOFOL 10 MG/ML
INJECTION, EMULSION INTRAVENOUS
Status: DISPENSED
Start: 2019-12-05 | End: 2019-12-05

## 2019-12-05 RX ORDER — SODIUM CHLORIDE 0.9 % (FLUSH) 0.9 %
10 SYRINGE (ML) INJECTION EVERY 12 HOURS SCHEDULED
Status: DISCONTINUED | OUTPATIENT
Start: 2019-12-05 | End: 2019-12-07 | Stop reason: HOSPADM

## 2019-12-05 RX ORDER — FENTANYL CITRATE 50 UG/ML
50 INJECTION, SOLUTION INTRAMUSCULAR; INTRAVENOUS
Status: DISCONTINUED | OUTPATIENT
Start: 2019-12-05 | End: 2019-12-06

## 2019-12-05 RX ORDER — SODIUM CHLORIDE 0.9 % (FLUSH) 0.9 %
10 SYRINGE (ML) INJECTION PRN
Status: DISCONTINUED | OUTPATIENT
Start: 2019-12-05 | End: 2019-12-07 | Stop reason: HOSPADM

## 2019-12-05 RX ORDER — ETOMIDATE 2 MG/ML
20 INJECTION INTRAVENOUS ONCE
Status: COMPLETED | OUTPATIENT
Start: 2019-12-05 | End: 2019-12-05

## 2019-12-05 RX ORDER — ACETAMINOPHEN 325 MG/1
650 TABLET ORAL EVERY 4 HOURS PRN
Status: DISCONTINUED | OUTPATIENT
Start: 2019-12-05 | End: 2019-12-07 | Stop reason: HOSPADM

## 2019-12-05 RX ORDER — ONDANSETRON 2 MG/ML
4 INJECTION INTRAMUSCULAR; INTRAVENOUS EVERY 6 HOURS PRN
Status: DISCONTINUED | OUTPATIENT
Start: 2019-12-05 | End: 2019-12-06

## 2019-12-05 RX ORDER — SODIUM CHLORIDE 9 MG/ML
INJECTION, SOLUTION INTRAVENOUS CONTINUOUS
Status: DISCONTINUED | OUTPATIENT
Start: 2019-12-05 | End: 2019-12-07 | Stop reason: HOSPADM

## 2019-12-05 RX ORDER — FENTANYL CITRATE 50 UG/ML
25 INJECTION, SOLUTION INTRAMUSCULAR; INTRAVENOUS
Status: DISCONTINUED | OUTPATIENT
Start: 2019-12-05 | End: 2019-12-05

## 2019-12-05 RX ORDER — MIDAZOLAM HYDROCHLORIDE 1 MG/ML
INJECTION INTRAMUSCULAR; INTRAVENOUS
Status: COMPLETED
Start: 2019-12-05 | End: 2019-12-05

## 2019-12-05 RX ORDER — PROPOFOL 10 MG/ML
10 INJECTION, EMULSION INTRAVENOUS CONTINUOUS
Status: DISCONTINUED | OUTPATIENT
Start: 2019-12-05 | End: 2019-12-06

## 2019-12-05 RX ORDER — POTASSIUM CHLORIDE 29.8 MG/ML
20 INJECTION INTRAVENOUS
Status: COMPLETED | OUTPATIENT
Start: 2019-12-05 | End: 2019-12-05

## 2019-12-05 RX ORDER — LORAZEPAM 2 MG/ML
INJECTION INTRAMUSCULAR
Status: COMPLETED
Start: 2019-12-05 | End: 2019-12-05

## 2019-12-05 RX ORDER — POTASSIUM CHLORIDE 7.45 MG/ML
10 INJECTION INTRAVENOUS PRN
Status: DISCONTINUED | OUTPATIENT
Start: 2019-12-05 | End: 2019-12-07 | Stop reason: HOSPADM

## 2019-12-05 RX ORDER — FENTANYL CITRATE 50 UG/ML
INJECTION, SOLUTION INTRAMUSCULAR; INTRAVENOUS
Status: COMPLETED
Start: 2019-12-05 | End: 2019-12-05

## 2019-12-05 RX ORDER — MIDAZOLAM HYDROCHLORIDE 1 MG/ML
2 INJECTION INTRAMUSCULAR; INTRAVENOUS ONCE
Status: COMPLETED | OUTPATIENT
Start: 2019-12-05 | End: 2019-12-05

## 2019-12-05 RX ORDER — MAGNESIUM SULFATE 1 G/100ML
1 INJECTION INTRAVENOUS PRN
Status: DISCONTINUED | OUTPATIENT
Start: 2019-12-05 | End: 2019-12-07 | Stop reason: HOSPADM

## 2019-12-05 RX ADMIN — POTASSIUM CHLORIDE 20 MEQ: 29.8 INJECTION, SOLUTION INTRAVENOUS at 10:52

## 2019-12-05 RX ADMIN — LORAZEPAM 2 MG: 2 INJECTION INTRAMUSCULAR at 03:29

## 2019-12-05 RX ADMIN — SODIUM CHLORIDE: 9 INJECTION, SOLUTION INTRAVENOUS at 20:13

## 2019-12-05 RX ADMIN — FAMOTIDINE 20 MG: 10 INJECTION, SOLUTION INTRAVENOUS at 08:39

## 2019-12-05 RX ADMIN — LORAZEPAM 2 MG: 2 INJECTION INTRAMUSCULAR; INTRAVENOUS at 03:29

## 2019-12-05 RX ADMIN — ENOXAPARIN SODIUM 40 MG: 100 INJECTION SUBCUTANEOUS at 07:47

## 2019-12-05 RX ADMIN — MUPIROCIN: 20 OINTMENT TOPICAL at 10:07

## 2019-12-05 RX ADMIN — CHLORHEXIDINE GLUCONATE 0.12% ORAL RINSE 15 ML: 1.2 LIQUID ORAL at 07:47

## 2019-12-05 RX ADMIN — Medication 10 ML: at 21:58

## 2019-12-05 RX ADMIN — FAMOTIDINE 20 MG: 10 INJECTION, SOLUTION INTRAVENOUS at 21:58

## 2019-12-05 RX ADMIN — PROPOFOL 40 MCG/KG/MIN: 10 INJECTION, EMULSION INTRAVENOUS at 20:13

## 2019-12-05 RX ADMIN — ROCURONIUM BROMIDE 80 MG: 10 INJECTION INTRAVENOUS at 03:26

## 2019-12-05 RX ADMIN — FENTANYL CITRATE 25 MCG: 50 INJECTION INTRAMUSCULAR; INTRAVENOUS at 10:11

## 2019-12-05 RX ADMIN — CHLORHEXIDINE GLUCONATE 0.12% ORAL RINSE 15 ML: 1.2 LIQUID ORAL at 21:57

## 2019-12-05 RX ADMIN — PROPOFOL 20 MCG/KG/MIN: 10 INJECTION, EMULSION INTRAVENOUS at 05:20

## 2019-12-05 RX ADMIN — SODIUM CHLORIDE: 9 INJECTION, SOLUTION INTRAVENOUS at 07:48

## 2019-12-05 RX ADMIN — FENTANYL CITRATE 25 MCG: 50 INJECTION INTRAMUSCULAR; INTRAVENOUS at 14:15

## 2019-12-05 RX ADMIN — SODIUM CHLORIDE 1000 ML: 9 INJECTION, SOLUTION INTRAVENOUS at 04:32

## 2019-12-05 RX ADMIN — MIDAZOLAM HYDROCHLORIDE 2 MG: 1 INJECTION, SOLUTION INTRAMUSCULAR; INTRAVENOUS at 10:10

## 2019-12-05 RX ADMIN — MIDAZOLAM HYDROCHLORIDE 2 MG: 1 INJECTION, SOLUTION INTRAMUSCULAR; INTRAVENOUS at 20:44

## 2019-12-05 RX ADMIN — POTASSIUM CHLORIDE 20 MEQ: 29.8 INJECTION, SOLUTION INTRAVENOUS at 10:07

## 2019-12-05 RX ADMIN — Medication 10 ML: at 07:48

## 2019-12-05 RX ADMIN — MUPIROCIN: 20 OINTMENT TOPICAL at 22:00

## 2019-12-05 RX ADMIN — MIDAZOLAM HYDROCHLORIDE 2 MG: 1 INJECTION INTRAMUSCULAR; INTRAVENOUS at 10:10

## 2019-12-05 RX ADMIN — PROPOFOL 40 MCG/KG/MIN: 10 INJECTION, EMULSION INTRAVENOUS at 16:33

## 2019-12-05 RX ADMIN — ETOMIDATE 20 MG: 2 INJECTION, SOLUTION INTRAVENOUS at 03:26

## 2019-12-05 RX ADMIN — PROPOFOL 25 MCG/KG/MIN: 10 INJECTION, EMULSION INTRAVENOUS at 10:38

## 2019-12-05 ASSESSMENT — PULMONARY FUNCTION TESTS
PIF_VALUE: 18
PIF_VALUE: 16
PIF_VALUE: 13
PIF_VALUE: 14
PIF_VALUE: 14
PIF_VALUE: 13

## 2019-12-05 ASSESSMENT — PAIN SCALES - GENERAL
PAINLEVEL_OUTOF10: 0
PAINLEVEL_OUTOF10: 8
PAINLEVEL_OUTOF10: 0
PAINLEVEL_OUTOF10: 0

## 2019-12-05 NOTE — PROGRESS NOTES
4 Eyes Skin Assessment     The patient is being assess for   Admission    I agree that 2 RN's have performed a thorough Head to Toe Skin Assessment on the patient. ALL assessment sites listed below have been assessed. Areas assessed by both nurses:   [x]   Head, Face, and Ears   [x]   Shoulders, Back, and Chest, Abdomen  [x]   Arms, Elbows, and Hands   [x]   Coccyx, Sacrum, and Ischium  [x]   Legs, Feet, and Heels        Scattered abrasions and bruising. Track marks noted to BUE.     **SHARE this note so that the co-signing nurse is able to place an eSignature**    Co-signer eSignature: Electronically signed by Yakov Gardiner RN on 12/5/19 at 8:06 AM    Does the Patient have Skin Breakdown?   No          Hua Prevention initiated:  Yes   Wound Care Orders initiated:  No      [facility] nurse consulted for Pressure Injury (Stage 3,4, Unstageable, DTI, NWPT, Complex wounds)and New or Established Ostomies:  No      Primary Nurse eSignature: Electronically signed by Víctor Rowley RN on 12/5/19 at 6:58 AM

## 2019-12-05 NOTE — ED NOTES
Perfect Serve to hospitalist Dr. Sonam Fortune at 7685 for consult per Dr. Mode Woodson. Ranjit Velez  12/05/19 2940       Dr. Sonam Fortune called at [address] spoke with Dr. Mode Woodson.      [facility]  12/05/19 2325

## 2019-12-05 NOTE — PROGRESS NOTES
High risk vesicant drug infusing:  ____y______    Multiple incompatible medications infusing:  ___n______    CVP Monitoring:  ___n______    Extremely difficult IV access challenge:  ___y_____    Continued need for central line access:  ____y______    Addressed with physician:  __y______    RIGHT PATIENT, RIGHT TIME, RIGHT LINE

## 2019-12-05 NOTE — ED NOTES
Report given to Nasir Carballo and Flavia Merrill at this time, Alfredo Later to transport to unit with RT     Cee Gleason RN  12/05/19 1950

## 2019-12-05 NOTE — PROGRESS NOTES
Patient admitted to ICU for acute respiratory failure secondary to an apparent drug OD. Hx of drug abuse in past.   Tested positive for amphetamines and marijuana. He did test positive for some tylenol and ASA as well. Received 6 mg of NARCAN in field intranasally with no response. Apparently he was found to be minimally responsive by wife at home. According to night RN he was intubated at 0330 and did not require sedation until around 0530. Telemetry monitor hooked up to patient. NSR on monitor. Breathing pattern appears easy and even. Lung sounds show diminished breath sounds throuhgout. Admitted to ICU on VENT. Patient currently intubated with ETT at 24LL. Vent settings are 14 RR, 400 VT, 5 of PEEP and 35% FiO2. Tolerating VENT well right now. He is breathing over set rate. Propofol infusing at 30 mcg/kg/min. Mental status is unable to be assessed at this time. He does not respond to voice. However, he does appear to respond to pain. BUE strength is ANJEL. BLE strength is ANJEL. PERRLA. Abdomen appears flat and soft. Bowels active x4. IV access is x1 PIV and Triple lumen CVC in RIGHT FEM. Urinary catheter in place. No edema noted throughout. CHG bath provided for patient.       Electronically signed by Leeroy Reyes RN on 12/5/2019 at 8:22 AM    Vitals:    12/05/19 0800   BP: 109/80   Pulse: 81   Resp: 20   Temp: 98 °F (36.7 °C)   SpO2: 100%

## 2019-12-05 NOTE — PLAN OF CARE
Problem: Falls - Risk of:  Goal: Will remain free from falls  Description  Will remain free from falls  Outcome: Ongoing  Goal: Absence of physical injury  Description  Absence of physical injury  Outcome: Ongoing     Problem: Risk for Impaired Skin Integrity  Goal: Tissue integrity - skin and mucous membranes  Description  Structural intactness and normal physiological function of skin and  mucous membranes.   Outcome: Ongoing     Problem: Restraint Use - Nonviolent/Non-Self-Destructive Behavior:  Goal: Absence of restraint indications  Description  Absence of restraint indications  Outcome: Ongoing  Goal: Absence of restraint-related injury  Description  Absence of restraint-related injury  Outcome: Ongoing

## 2019-12-05 NOTE — CONSULTS
Allergies. REVIEW OF SYSTEMS: Intubated unresponsive unable to obtain      PHYSICAL EXAM:  Blood pressure (!) 131/91, pulse 88, temperature 97.8 °F (36.6 °C), temperature source Axillary, resp. rate 18, height 5' 9\" (1.753 m), weight 176 lb 5.9 oz (80 kg), SpO2 100 %.' on AC  14/400/+5 30%   Gen: No distress. Eyes: PERRL. No sclera icterus. No conjunctival injection. ENT: No discharge. Pharynx clear. Neck: Trachea midline. No obvious mass. Resp: No accessory muscle use. No crackles. No wheezes. No rhonchi. No dullness on percussion. CV: Regular rate. Regular rhythm. No murmur or rub. No edema. Peripheral pulses are 2+. Capillary refill is less than 3 seconds. GI: Non-tender. Non-distended. No hernia. Skin: Warm and dry. No nodule on exposed extremities. Lymph: No cervical LAD. No supraclavicular LAD. M/S: No cyanosis. No joint deformity. No clubbing. Neuro: Intubated and sedated. Good cough and gag reflexes. Responsive to painful stimuli. Psych: No agitation     LABS:  CBC:   Recent Labs     12/05/19 0429   WBC 12.6*   HGB 16.7   HCT 50.8   MCV 95.8        BMP:   Recent Labs     12/05/19 0429      K 3.4*      CO2 24   BUN 13   CREATININE 1.0     LIVER PROFILE:   Recent Labs     12/05/19 0429   AST 36   ALT 34   BILITOT 0.5   ALKPHOS 80     PT/INR: No results for input(s): PROTIME, INR in the last 72 hours. APTT: No results for input(s): APTT in the last 72 hours. UA:  Recent Labs     12/05/19  0500   COLORU Yellow   PHUR 7.5  7.5   CLARITYU Clear   SPECGRAV 1.010   LEUKOCYTESUR Negative   UROBILINOGEN 0.2   BILIRUBINUR Negative   BLOODU Negative   GLUCOSEU Negative     Recent Labs     12/05/19  0457   PHART 7.469*   LYC0SBN 34.7*   PO2ART 111.7*       Chest x-ray 12/5 imaging was reviewed by me and showed   No acute infiltrates  Satisfactory ET tube position    Head CT no acute intracranial abnormalities      ASSESSMENT:  · Acute respiratory failure.     · Drug

## 2019-12-05 NOTE — PROGRESS NOTES
Nutrition Assessment    Type and Reason for Visit: Initial, Consult(consult for TF ordering and management)    Nutrition Recommendations:   1. Continue NPO status until medically cleared to receive nutrition therapy. 2. TF recommendations - Jevity 1.2 with a goal rate of 70 ml/hr x 20 hours. Start with 20 ml/hr and increase by 20 ml every 4-6 hours, as tolerated by patient, until goal rate can be achieved and maintained. Please administer one proteinex 2go once daily via feeding tube. Water flushes, 60 ml every 6 hours or per MD guidance. 3. Monitor vent status, sedation type/amount, and plan of care. 4. Monitor mental status, weight trends, nutrition-related labs, and bowel function. Nutrition Assessment: patient is nutritionally compromised AEB suspected OD on sleeping pills PTA, hx of heroin use + rehab, and intubation status and he is at risk for further compromise d/t NPO status, tox screen was + for amphetamines and marijuana, and episode of emesis PTA; will continue NPO status and provide EN recommendations    Malnutrition Assessment:  · Malnutrition Status: At risk for malnutrition  · Context: Acute illness or injury  · Findings of the 6 clinical characteristics of malnutrition (Minimum of 2 out of 6 clinical characteristics is required to make the diagnosis of moderate or severe Protein Calorie Malnutrition based on AND/ASPEN Guidelines):  1. Energy Intake-Unable to assess, Unable to assess    2. Weight Loss-Unable to assess, in 1 year  3. Fat Loss-No significant subcutaneous fat loss,    4. Muscle Loss-Mild muscle mass loss, Clavicles (pectoralis and deltoids)  5. Fluid Accumulation-No significant fluid accumulation,    6.   Strength-Not measured    Nutrition Risk Level: High    Nutrient Needs:  · Estimated Daily Total Kcal: 1760 - 2000 kcals based on 22-25 kcals/kg/CBW  · Estimated Daily Protein (g): 88 - 95 g protein based on 1.2-1.3 g/kg/IBW  · Estimated Daily Total Fluid (ml/day): 1700

## 2019-12-05 NOTE — PROGRESS NOTES
12/05/19 0419   Vent Information   Ventilator Started Yes   Skin Assessment Clean, dry, & intact   Vent Type 840   Vent Mode AC/VC   Vt Ordered 400 mL   Rate Set 14 bmp   Peak Flow 60 L/min   FiO2  40 %   Sensitivity 3   PEEP/CPAP 5   Humidification Source Heated wire   Humidification Temp 37   Humidification Temp Measured 37   Circuit Condensation Drained   Vent Patient Data   Peak Inspiratory Pressure 16 cmH2O   Mean Airway Pressure 8 cmH20   Rate Measured 16 br/min   Vt Exhaled 395 mL   Minute Volume 6.55 Liters   I:E Ratio 1:3.2   Cough/Sputum   Sputum How Obtained None   Spontaneous Breathing Trial (SBT) RT Doc   Pulse 99   Breath Sounds   Right Upper Lobe Diminished   Right Middle Lobe Diminished   Right Lower Lobe Diminished   Left Upper Lobe Diminished   Left Lower Lobe Diminished   Additional Respiratory  Assessments   Resp 14   Position Semi-Sharma's   Alarm Settings   High Pressure Alarm 40 cmH2O   Low Minute Volume Alarm 2.5 L/min   High Respiratory Rate 45 br/min   Low Exhaled Vt  355 mL   Patient Observation   Observations #8 ETT, ambu bag present    Non-Surgical Airway Endo Tracheal Tube   Placement Date/Time: 12/05/19 0327   Placed By: In ED  Inserted by: DR Brayan Parra  Insertion attempts: 1  Airway Device: Endo Tracheal Tube  Size: 8   Secured at 24 cm   Measured From [address] [facility] By Commercial tube lieberman   Site Condition Dry

## 2019-12-05 NOTE — ED PROVIDER NOTES
Axillary      SpO2 12/05/19 0326 98 %      Weight 12/05/19 0326 150 lb (68 kg)      Height 12/05/19 0326 5' 9\" (1.753 m)      Head Circumference --       Peak Flow --       Pain Score --       Pain Loc --       Pain Edu? --       Excl. in GC? --        GENERAL: Somnolent, unresponsive. Well developed, well nourished with apparent distress. Nontoxic-appearing, non-ill-appearing. Responsive to painful stimuli only. HENT:  Normocephalic, Atraumatic, no lacerations. Oropharynx appears to have blood as visualized with the laryngoscope, no tonsilar inflammation, no tonsilar exudates, no airway obstruction, moist mucous membranes. Uvula was midline and has symmetric rise. EYES:  Conjunctiva normal, Pupils equal round and unreactive to light, dilated at 5 mm, patient is keeping his eyes open  NECK:  Trachea is midline. Positive stridor with gurgling. CHEST:  Regular rate and regular rhythm, no murmurs/rubs/gallops, normal S1/S2, chest wall non-tender. LUNGS:  No respiratory distress. No abdominal retractions, no sternal retractions. Clear to auscultation bilaterally, no wheezing, no rhochi, no rales  ABDOMEN:  Soft, non-tender, non-distended. Normal BS, no organomegaly, no abdominal masses  EXTREMITIES:    no edema,  no deformity, distal pulses present and equal bilaterally   SKIN: Warm, dry and intact. NEUROLOGIC: Unresponsive      LABS and DIAGNOSTIC RESULTS  EKG  The Ekg interpreted by me shows  normal sinus rhythm with a rate of 97  Axis is   Normal  QTc is  normal  Intervals and Durations are unremarkable. ST Segments: normal  Delta waves, Brugada Syndrome, and Short HI are not present. Prior EKG to compare with was available. No significant changes compared to prior EKG from May 8, 2019        RADIOLOGY  X-RAYS:  I have reviewed radiologic plain film image(s). ALL OTHER NON-PLAIN FILM IMAGES SUCH AS CT, ULTRASOUND AND MRI HAVE BEEN READ BY THE RADIOLOGIST.   CT head without contrast   Final Encounter   Medications    0.9 % sodium chloride bolus    etomidate (AMIDATE) injection 20 mg    rocuronium (ZEMURON) injection 80 mg    propofol injection    LORazepam (ATIVAN) 2 MG/ML injection     MALKA JUAREZ: cabinet override    LORazepam (ATIVAN) injection 2 mg    propofol 1000 MG/100ML injection     MALKA JUARZE: cabinet override       ED course notes for this visit         Results for orders placed or performed during the hospital encounter of 12/05/19   Ethanol   Result Value Ref Range    Ethanol Lvl None Detected mg/dL   Drug screen multi urine   Result Value Ref Range    Amphetamine Screen, Urine POSITIVE (A) Negative <1000ng/mL    Barbiturate Screen, Ur Neg Negative <200 ng/mL    Benzodiazepine Screen, Urine Neg Negative <200 ng/mL    Cannabinoid Scrn, Ur POSITIVE (A) Negative <50 ng/mL    Cocaine Metabolite Screen, Urine Neg Negative <300 ng/mL    Opiate Scrn, Ur Neg Negative <300 ng/mL    PCP Screen, Urine Neg Negative <25 ng/mL    Methadone Screen, Urine Neg Negative <300 ng/mL    Propoxyphene Scrn, Ur Neg Negative <300 ng/mL    Oxycodone Urine Neg Negative <100 ng/ml    pH, UA 7.5     Drug Screen Comment: see below    Acetaminophen level   Result Value Ref Range    Acetaminophen Level 10 10 - 30 ug/mL   Salicylate   Result Value Ref Range    Salicylate, Serum 9.1 (L) 15.0 - 30.0 mg/dL   CBC Auto Differential   Result Value Ref Range    WBC 12.6 (H) 4.0 - 11.0 K/uL    RBC 5.30 4.20 - 5.90 M/uL    Hemoglobin 16.7 13.5 - 17.5 g/dL    Hematocrit 50.8 40.5 - 52.5 %    MCV 95.8 80.0 - 100.0 fL    MCH 31.4 26.0 - 34.0 pg    MCHC 32.8 31.0 - 36.0 g/dL    RDW 13.1 12.4 - 15.4 %    Platelets 611 285 - 343 K/uL    MPV 8.2 5.0 - 10.5 fL    Neutrophils % 76.5 %    Lymphocytes % 16.0 %    Monocytes % 5.9 %    Eosinophils % 1.0 %    Basophils % 0.6 %    Neutrophils Absolute 9.7 (H) 1.7 - 7.7 K/uL    Lymphocytes Absolute 2.0 1.0 - 5.1 K/uL    Monocytes Absolute 0.7 0.0 - 1.3 K/uL    Eosinophils Absolute 0.1 0.0 - 0.6 K/uL    Basophils Absolute 0.1 0.0 - 0.2 K/uL   Comprehensive Metabolic Panel w/ Reflex to MG   Result Value Ref Range    Sodium 140 136 - 145 mmol/L    Potassium reflex Magnesium 3.4 (L) 3.5 - 5.1 mmol/L    Chloride 102 99 - 110 mmol/L    CO2 24 21 - 32 mmol/L    Anion Gap 14 3 - 16    Glucose 76 70 - 99 mg/dL    BUN 13 7 - 20 mg/dL    CREATININE 1.0 0.9 - 1.3 mg/dL    GFR Non-African American >60 >60    GFR African American >60 >60    Calcium 9.7 8.3 - 10.6 mg/dL    Total Protein 8.5 (H) 6.4 - 8.2 g/dL    Alb 4.8 3.4 - 5.0 g/dL    Albumin/Globulin Ratio 1.3 1.1 - 2.2    Total Bilirubin 0.5 0.0 - 1.0 mg/dL    Alkaline Phosphatase 80 40 - 129 U/L    ALT 34 10 - 40 U/L    AST 36 15 - 37 U/L    Globulin 3.7 g/dL   Urinalysis, reflex to microscopic   Result Value Ref Range    Color, UA Yellow Straw/Yellow    Clarity, UA Clear Clear    Glucose, Ur Negative Negative mg/dL    Bilirubin Urine Negative Negative    Ketones, Urine Negative Negative mg/dL    Specific Gravity, UA 1.010 1.005 - 1.030    Blood, Urine Negative Negative    pH, UA 7.5 5.0 - 8.0    Protein, UA Negative Negative mg/dL    Urobilinogen, Urine 0.2 <2.0 E.U./dL    Nitrite, Urine Negative Negative    Leukocyte Esterase, Urine Negative Negative    Microscopic Examination Not Indicated     Urine Type NotGiven    Blood gas, arterial   Result Value Ref Range    pH, Arterial 7.469 (H) 7.350 - 7.450    pCO2, Arterial 34.7 (L) 35.0 - 45.0 mmHg    pO2, Arterial 111.7 (H) 75.0 - 108.0 mmHg    HCO3, Arterial 24.6 21.0 - 29.0 mmol/L    Base Excess, Arterial 1.5 -3.0 - 3.0 mmol/L    Hemoglobin, Art, Extended 15.0 13.5 - 17.5 g/dL    O2 Sat, Arterial 98.4 >92 %    Carboxyhgb, Arterial 3.1 (H) 0.0 - 1.5 %    Methemoglobin, Arterial 0.2 <1.5 %    TCO2, Arterial 25.7 Not Established mmol/L    O2 Content, Arterial 21 Not Established mL/dL    O2 Therapy Unknown    Magnesium   Result Value Ref Range    Magnesium 2.30 1.80 - 2.40 mg/dL       Secure text message was sent to Dr. Severa Cole, hospitalist.  [facility] decided to admit Mikey Nguyen for further observation and evaluation of Cheryl Momin's mental status change. As I have deemed necessary from their history, physical and studies, I have considered and evaluated Mikey Nguyen for the following diagnoses:  DIABETES, INTRACRANIAL HEMORRHAGE, MENINGITIS, SEPSIS SUBARACHNOID HEMORRHAGE, SUBDURAL HEMATOMA, & STROKE. FINAL IMPRESSION  1. Acute respiratory failure, unspecified whether with hypoxia or hypercapnia ([address]    2. Unresponsive    3. Polysubstance abuse (HCC)        Vitals:  Blood pressure 119/86, pulse 90, temperature 97.7 °F (36.5 °C), temperature source Axillary, resp. rate 19, height 5' 9\" (1.753 m), weight 176 lb 5.9 oz (80 kg), SpO2 100 %. Disposition      Pt is in stable condition upon Admit to CCU/ICU. Please note, critical care time was at least 90 minutes, obtaining history, conducting a physical exam, performing and monitoring interventions, ordering, collecting and interpreting tests, and establishing medical decision-making and discussion with the patient and/or family, specifically for management of the presenting complaint and symptoms initially, direct bedside care, reevaluation, review of records, and consultation. There was a high probability of clinically significant life-threatening deterioration in the patient's condition, which required my urgent intervention. This time does not include separately billable procedures. The note was completed using Dragon voice recognition transcription. Every effort was made to ensure accuracy; however, inadvertent transcription errors may be present despite my best efforts to edit errors.     Tito Marino MD  [address] [place] [address] MD Lito  12/05/19 0798 .S. Rosa Maria ROJAS MD  12/05/19 1452

## 2019-12-05 NOTE — CARE COORDINATION
Case Management Assessment  Initial Evaluation    Date/Time of Evaluation: 12/5/2019 10:38 AM  Assessment Completed by: Johnice Schlatter    Patient Name: Libia Pena  YOB: 1980  Diagnosis: Acute respiratory failure ([facility] [address] [J96.00]  Date / Time: 12/5/2019  3:14 AM  Admission status/Date:12/05/19  Chart Reviewed: Yes      Patient Interviewed: No   Family Interviewed:  Yes - mother and brother      Hospitalization in the last 30 days:  No    Contacts  :     Relationship to Patient:   Phone Number:    Alternate Contact:     Relationship to Patient:     Phone Number:    Met with:    Current PCP Hank Ng  Financial  self pay  Precert required for SNF : Y, N        3 night stay required - Y, N    ADLS  Support Systems:    Transportation: self    Meal Preparation: self    Housing  Home Environment: house  Steps: n/a  Plans to Return to Present Housing: Yes  Other Identified Issues: Choco Butterfield  Currently active with [address] : No     Passport/Waiver : No  :                      Phone Number:    Passport/Waiver Services: n/a          Durable Medical Equipment   DME Provider: n/a  Equipment: n/aWalker__Cane__RTS__ BSC__Shower Chair__  02__ HHN__ CPAP__  BiPap__  Hospital Bed__ W/C___ Other__________      Has Home O2 in place on admit:  No  Informed of need to bring portable home O2 tank on day of discharge for nursing to connect prior to leaving:   Not Indicated  Verbalized agreement/Understanding:   Not Indicated    Community Service Affiliation  Dialysis:  No    · Name:  · Location  · Dialysis Schedule:  · Phone:   · Fax: Outpatient PT/OT: No    Cancer Center: No     CHF Clinic: No     Pulmonary Rehab: No  Pain Clinic: Yes -In  [facility]: No    Wound Clinic: No     Other: n/a    DISCHARGE PLAN: Pt intubated and sedated. Mother and brother at bedside. Pt lives with spouse and mother. Plan to return. I-pta.  Has been in rehab program in [facility]. CM to follow. Explained Case Management role/services.

## 2019-12-05 NOTE — H&P
History and Physical        HISTORY OF PRESENT ILLNESS:     Patient is a 64-year-old white male with a history of IV drug abuse. In the past he has used heroin. His wife from whom he is estranged is in the room and tells me that the patient got out of rehab recently. Trevor Mealing was his birthday. Although they are  she spent the night with him. She spoke to him at 11:30 PM.  He was awake and alert he went to bed. She woke up around 3:00 AM as the patient was throwing up on her. She had emesis x1. The emesis was clear he is not responsive. She called 911. He was given 6 mg of Narcan. There was no response. In the ER he was intubated for airway protection. He is currently sedated on propofol. There is also an empty bottle of Benadryl 25 mg - 100 tablets he will yesterday that the wife found empty. Also Tylenol and aspirin-10 tablets are missing. Wife denies any suicidal ideation by patient. Patient has No Known Allergies. Past Medical History:   Diagnosis Date    Back pain     GERD (gastroesophageal reflux disease)     Hepatitis C     Wears glasses        Past Surgical History:   Procedure Laterality Date    APPENDECTOMY      SHOULDER SURGERY Left 4/1/14    UPPER GASTROINTESTINAL ENDOSCOPY  2014       Scheduled Meds:   sodium chloride flush  10 mL Intravenous 2 times per day    enoxaparin  40 mg Subcutaneous Daily    chlorhexidine  15 mL Mouth/Throat BID    famotidine (PEPCID) injection  20 mg Intravenous BID    mupirocin   Nasal BID    potassium chloride  20 mEq Intravenous Q1H       Continuous Infusions:   propofol Stopped (12/05/19 0940)    sodium chloride 100 mL/hr at 12/05/19 0748       PRN Meds:  sodium chloride flush, magnesium hydroxide, ondansetron, potassium chloride **OR** potassium alternative oral replacement **OR** potassium chloride, magnesium sulfate, acetaminophen, fentanNYL       reports that he has been smoking cigarettes.  He has been smoking about 1.00 pack per day. He has never used smokeless tobacco.    Family History   Problem Relation Age of Onset    Cancer Mother     Heart Disease Father        Social History     Socioeconomic History    Marital status:      Spouse name: None    Number of children: None    Years of education: None    Highest education level: None   Occupational History    None   Social Needs    Financial resource strain: None    Food insecurity:     Worry: None     Inability: None    Transportation needs:     Medical: None     Non-medical: None   Tobacco Use    Smoking status: Current Every Day Smoker     Packs/day: 1.00     Types: Cigarettes    Smokeless tobacco: Never Used   Substance and Sexual Activity    Alcohol use: No     Alcohol/week: 0.0 standard drinks    Drug use: No     Comment: last used this weekend    Sexual activity: Yes     Partners: Female   Lifestyle    Physical activity:     Days per week: None     Minutes per session: None    Stress: None   Relationships    Social connections:     Talks on phone: None     Gets together: None     Attends Sikhism service: None     Active member of club or organization: None     Attends meetings of clubs or organizations: None     Relationship status: None    Intimate partner violence:     Fear of current or ex partner: None     Emotionally abused: None     Physically abused: None     Forced sexual activity: None   Other Topics Concern    None   Social History Narrative    None     REVIEW OF SYSTEMS:     ANJEL      VS:   /76   Pulse 84   Temp 98 °F (36.7 °C) (Axillary)   Resp 22   Ht 5' 9\" (1.753 m)   Wt 176 lb 5.9 oz (80 kg)   SpO2 99%   BMI 26.05 kg/m²     Gen: No distress. Alert. Eyes: PERRL. No sclera icterus. No conjunctival injection. ENT: No discharge. Pharynx clear. Neck: Trachea midline. Normal thyroid. Resp: No accessory muscle use. No crackles. No wheezes. No rhonchi. No dullness on percussion. CV: Regular rate.  Regular

## 2019-12-05 NOTE — PLAN OF CARE
Nutrition Problem: Inadequate oral intake  Intervention: Food and/or Nutrient Delivery: Continue NPO  Nutritional Goals: patient will remain in NPO status until medically cleared to receive nutrition therapy; weight will remain stable during remainder of admission

## 2019-12-05 NOTE — PROGRESS NOTES
Pt alarming and fighting ventilator. Stated to place back on [facility], RT at bedside. Restarted sedation, propofol 25 mcg/kg/min.  New order for 2 mg versed x1 from Dr. Miguel A Oviedo

## 2019-12-06 ENCOUNTER — APPOINTMENT (OUTPATIENT)
Dept: GENERAL RADIOLOGY | Age: 39
DRG: 917 | End: 2019-12-06
Payer: COMMERCIAL

## 2019-12-06 LAB
A/G RATIO: 1.1 (ref 1.1–2.2)
ALBUMIN SERPL-MCNC: 3 G/DL (ref 3.4–5)
ALP BLD-CCNC: 53 U/L (ref 40–129)
ALT SERPL-CCNC: 17 U/L (ref 10–40)
ANION GAP SERPL CALCULATED.3IONS-SCNC: 8 MMOL/L (ref 3–16)
ANION GAP SERPL CALCULATED.3IONS-SCNC: 9 MMOL/L (ref 3–16)
AST SERPL-CCNC: 18 U/L (ref 15–37)
BASE EXCESS ARTERIAL: -1.5 MMOL/L (ref -3–3)
BASOPHILS ABSOLUTE: 0.1 K/UL (ref 0–0.2)
BASOPHILS RELATIVE PERCENT: 0.5 %
BILIRUB SERPL-MCNC: <0.2 MG/DL (ref 0–1)
BUN BLDV-MCNC: 15 MG/DL (ref 7–20)
BUN BLDV-MCNC: 16 MG/DL (ref 7–20)
CALCIUM SERPL-MCNC: 8 MG/DL (ref 8.3–10.6)
CALCIUM SERPL-MCNC: 8.3 MG/DL (ref 8.3–10.6)
CALCIUM SERPL-MCNC: 8.7 MG/DL (ref 8.3–10.6)
CALCIUM SERPL-MCNC: 8.9 MG/DL (ref 8.3–10.6)
CARBOXYHEMOGLOBIN ARTERIAL: 1 % (ref 0–1.5)
CHLORIDE BLD-SCNC: 106 MMOL/L (ref 99–110)
CHLORIDE BLD-SCNC: 107 MMOL/L (ref 99–110)
CHLORIDE BLD-SCNC: 109 MMOL/L (ref 99–110)
CHLORIDE BLD-SCNC: 110 MMOL/L (ref 99–110)
CO2: 23 MMOL/L (ref 21–32)
CO2: 24 MMOL/L (ref 21–32)
CREAT SERPL-MCNC: 1.1 MG/DL (ref 0.9–1.3)
CREAT SERPL-MCNC: 1.3 MG/DL (ref 0.9–1.3)
EKG ATRIAL RATE: 83 BPM
EKG DIAGNOSIS: NORMAL
EKG P AXIS: 83 DEGREES
EKG P-R INTERVAL: 132 MS
EKG Q-T INTERVAL: 420 MS
EKG QRS DURATION: 96 MS
EKG QTC CALCULATION (BAZETT): 493 MS
EKG R AXIS: 50 DEGREES
EKG T AXIS: 80 DEGREES
EKG VENTRICULAR RATE: 83 BPM
EOSINOPHILS ABSOLUTE: 0.2 K/UL (ref 0–0.6)
EOSINOPHILS RELATIVE PERCENT: 1.5 %
GFR AFRICAN AMERICAN: >60
GFR NON-AFRICAN AMERICAN: >60
GLOBULIN: 2.7 G/DL
GLUCOSE BLD-MCNC: 166 MG/DL (ref 70–99)
GLUCOSE BLD-MCNC: 81 MG/DL (ref 70–99)
GLUCOSE BLD-MCNC: 82 MG/DL (ref 70–99)
GLUCOSE BLD-MCNC: 83 MG/DL (ref 70–99)
GLUCOSE BLD-MCNC: 84 MG/DL (ref 70–99)
GLUCOSE BLD-MCNC: 86 MG/DL (ref 70–99)
HCO3 ARTERIAL: 24.1 MMOL/L (ref 21–29)
HCT VFR BLD CALC: 37.2 % (ref 40.5–52.5)
HEMOGLOBIN, ART, EXTENDED: 12.9 G/DL (ref 13.5–17.5)
HEMOGLOBIN: 12.5 G/DL (ref 13.5–17.5)
LYMPHOCYTES ABSOLUTE: 2.4 K/UL (ref 1–5.1)
LYMPHOCYTES RELATIVE PERCENT: 23.6 %
MCH RBC QN AUTO: 32.7 PG (ref 26–34)
MCHC RBC AUTO-ENTMCNC: 33.6 G/DL (ref 31–36)
MCV RBC AUTO: 97.1 FL (ref 80–100)
METHEMOGLOBIN ARTERIAL: 0.2 %
MONOCYTES ABSOLUTE: 1 K/UL (ref 0–1.3)
MONOCYTES RELATIVE PERCENT: 9.9 %
NEUTROPHILS ABSOLUTE: 6.7 K/UL (ref 1.7–7.7)
NEUTROPHILS RELATIVE PERCENT: 64.5 %
O2 CONTENT ARTERIAL: 18 ML/DL
O2 SAT, ARTERIAL: 97.6 %
O2 THERAPY: ABNORMAL
PCO2 ARTERIAL: 44 MMHG (ref 35–45)
PDW BLD-RTO: 13.1 % (ref 12.4–15.4)
PERFORMED ON: NORMAL
PERFORMED ON: NORMAL
PH ARTERIAL: 7.36 (ref 7.35–7.45)
PLATELET # BLD: 214 K/UL (ref 135–450)
PMV BLD AUTO: 9 FL (ref 5–10.5)
PO2 ARTERIAL: 104.8 MMHG (ref 75–108)
POTASSIUM REFLEX MAGNESIUM: 3.7 MMOL/L (ref 3.5–5.1)
POTASSIUM SERPL-SCNC: 3.4 MMOL/L (ref 3.5–5.1)
POTASSIUM SERPL-SCNC: 3.6 MMOL/L (ref 3.5–5.1)
POTASSIUM SERPL-SCNC: 3.7 MMOL/L (ref 3.5–5.1)
POTASSIUM SERPL-SCNC: 3.8 MMOL/L (ref 3.5–5.1)
RBC # BLD: 3.83 M/UL (ref 4.2–5.9)
SODIUM BLD-SCNC: 138 MMOL/L (ref 136–145)
SODIUM BLD-SCNC: 139 MMOL/L (ref 136–145)
SODIUM BLD-SCNC: 140 MMOL/L (ref 136–145)
SODIUM BLD-SCNC: 141 MMOL/L (ref 136–145)
TCO2 ARTERIAL: 25.5 MMOL/L
TOTAL CK: 139 U/L (ref 39–308)
TOTAL CK: 158 U/L (ref 39–308)
TOTAL CK: 162 U/L (ref 39–308)
TOTAL PROTEIN: 5.7 G/DL (ref 6.4–8.2)
WBC # BLD: 10.4 K/UL (ref 4–11)

## 2019-12-06 PROCEDURE — 80053 COMPREHEN METABOLIC PANEL: CPT

## 2019-12-06 PROCEDURE — 82803 BLOOD GASES ANY COMBINATION: CPT

## 2019-12-06 PROCEDURE — 99291 CRITICAL CARE FIRST HOUR: CPT | Performed by: INTERNAL MEDICINE

## 2019-12-06 PROCEDURE — 93005 ELECTROCARDIOGRAM TRACING: CPT | Performed by: INTERNAL MEDICINE

## 2019-12-06 PROCEDURE — 2580000003 HC RX 258: Performed by: INTERNAL MEDICINE

## 2019-12-06 PROCEDURE — 6370000000 HC RX 637 (ALT 250 FOR IP): Performed by: PSYCHIATRY & NEUROLOGY

## 2019-12-06 PROCEDURE — 2500000003 HC RX 250 WO HCPCS: Performed by: INTERNAL MEDICINE

## 2019-12-06 PROCEDURE — 6370000000 HC RX 637 (ALT 250 FOR IP): Performed by: INTERNAL MEDICINE

## 2019-12-06 PROCEDURE — 2000000000 HC ICU R&B

## 2019-12-06 PROCEDURE — 6360000002 HC RX W HCPCS: Performed by: INTERNAL MEDICINE

## 2019-12-06 PROCEDURE — 93010 ELECTROCARDIOGRAM REPORT: CPT | Performed by: INTERNAL MEDICINE

## 2019-12-06 PROCEDURE — 99233 SBSQ HOSP IP/OBS HIGH 50: CPT | Performed by: INTERNAL MEDICINE

## 2019-12-06 PROCEDURE — 99254 IP/OBS CNSLTJ NEW/EST MOD 60: CPT | Performed by: PSYCHIATRY & NEUROLOGY

## 2019-12-06 PROCEDURE — 36415 COLL VENOUS BLD VENIPUNCTURE: CPT

## 2019-12-06 PROCEDURE — 85025 COMPLETE CBC W/AUTO DIFF WBC: CPT

## 2019-12-06 PROCEDURE — 71045 X-RAY EXAM CHEST 1 VIEW: CPT

## 2019-12-06 PROCEDURE — 82550 ASSAY OF CK (CPK): CPT

## 2019-12-06 RX ORDER — OLANZAPINE 10 MG/1
10 TABLET ORAL NIGHTLY
Status: DISCONTINUED | OUTPATIENT
Start: 2019-12-06 | End: 2019-12-07 | Stop reason: HOSPADM

## 2019-12-06 RX ORDER — LORAZEPAM 2 MG/ML
1 INJECTION INTRAMUSCULAR EVERY 4 HOURS PRN
Status: DISCONTINUED | OUTPATIENT
Start: 2019-12-06 | End: 2019-12-07 | Stop reason: HOSPADM

## 2019-12-06 RX ADMIN — Medication 10 ML: at 03:56

## 2019-12-06 RX ADMIN — ENOXAPARIN SODIUM 40 MG: 100 INJECTION SUBCUTANEOUS at 08:40

## 2019-12-06 RX ADMIN — Medication 10 ML: at 08:40

## 2019-12-06 RX ADMIN — FAMOTIDINE 20 MG: 10 INJECTION, SOLUTION INTRAVENOUS at 19:30

## 2019-12-06 RX ADMIN — CHLORHEXIDINE GLUCONATE 0.12% ORAL RINSE 15 ML: 1.2 LIQUID ORAL at 08:40

## 2019-12-06 RX ADMIN — MIDAZOLAM HYDROCHLORIDE 2 MG: 1 INJECTION, SOLUTION INTRAMUSCULAR; INTRAVENOUS at 06:44

## 2019-12-06 RX ADMIN — MIDAZOLAM HYDROCHLORIDE 2 MG: 1 INJECTION, SOLUTION INTRAMUSCULAR; INTRAVENOUS at 03:15

## 2019-12-06 RX ADMIN — OLANZAPINE 10 MG: 10 TABLET, FILM COATED ORAL at 23:25

## 2019-12-06 RX ADMIN — LORAZEPAM 1 MG: 2 INJECTION INTRAMUSCULAR; INTRAVENOUS at 19:30

## 2019-12-06 RX ADMIN — Medication 10 ML: at 19:34

## 2019-12-06 RX ADMIN — PROPOFOL 50 MCG/KG/MIN: 10 INJECTION, EMULSION INTRAVENOUS at 00:36

## 2019-12-06 RX ADMIN — FAMOTIDINE 20 MG: 10 INJECTION, SOLUTION INTRAVENOUS at 08:40

## 2019-12-06 RX ADMIN — PROPOFOL 70 MCG/KG/MIN: 10 INJECTION, EMULSION INTRAVENOUS at 06:16

## 2019-12-06 RX ADMIN — MIDAZOLAM HYDROCHLORIDE 2 MG: 1 INJECTION, SOLUTION INTRAMUSCULAR; INTRAVENOUS at 05:17

## 2019-12-06 RX ADMIN — SODIUM CHLORIDE: 9 INJECTION, SOLUTION INTRAVENOUS at 06:21

## 2019-12-06 ASSESSMENT — PULMONARY FUNCTION TESTS
PIF_VALUE: 14

## 2019-12-06 NOTE — PROGRESS NOTES
Was numpv9r after propofol increase at 2013. Suddenly awake again, trying to sit up in bed, thrashing and reaching for ETT. Propofol to 50 mcg/kg/min and Versed gvien 2mg IVP for RASS +2 and to promote sedation.

## 2019-12-06 NOTE — PROGRESS NOTES
Latest Ref Range: 35.0 - 45.0 mmHg 34.7 (L) 44.0   pO2, Arterial Latest Ref Range: 75.0 - 108.0 mmHg 111.7 (H) 104.8   HCO3, Arterial Latest Ref Range: 21.0 - 29.0 mmol/L 24.6 24.1   TCO2 (calc), Art Latest Ref Range: Not Established mmol/L 25.7 25.5   Base Excess, Arterial Latest Ref Range: -3.0 - 3.0 mmol/L 1.5 -1.5   O2 Sat, Arterial Latest Ref Range: >92 % 98.4 97.6   O2 Content, Arterial Latest Ref Range: Not Established mL/dL 21 18   Methemoglobin, Arterial Latest Ref Range: <1.5 % 0.2 0.2   Carboxyhgb, Arterial Latest Ref Range: 0.0 - 1.5 % 3.1 (H) 1.0   Results for Estela Castaneda (MRN [id number]) as of 12/6/2019 09:26   Ref. Range 12/5/2019 04:29 12/5/2019 09:51   Acetaminophen Level Latest Ref Range: 10 - 30 ug/mL 10 <5 (L)   Salicylate, Serum Latest Ref Range: 15.0 - 30.0 mg/dL 9.1 (L) 4.3 (L)     Cultures:  pending    Films:    XR CHEST PORTABLE   Final Result   No developing pneumonia or edema         XR CHEST PORTABLE   Final Result   Supportive tubing is in normal position, including orogastric tube. No acute cardiopulmonary disease. XR PELVIS (1-2 VIEWS)   Final Result   1. Right femoral catheter tip overlies the right common iliac vein. CT head without contrast   Final Result   No acute intracranial abnormality. XR CHEST PORTABLE   Final Result   Appropriate endotracheal tube positioning. Proximal esophagogastric tube positioning. Recommend advancing 10-15 cm. Clear lungs.          XR CHEST PORTABLE    (Results Pending)          Assessment:    Principal Problem:    Acute respiratory failure (HCC)  Active Problems:    Psychoactive substance-induced organic mood disorder (HCC)    Gastroesophageal reflux disease without esophagitis    Hepatitis C virus infection without hepatic coma    IVDU (intravenous drug user)    Acute metabolic encephalopathy    Unresponsive    Polysubstance abuse ([facility] [address]    Intentional drug overdose ([facility] [address]    Disorder of electrolytes Leukocytosis  Resolved Problems:    * No resolved hospital problems. *         Plan:    #Acute respiratory failure. Patient was on a spontaneous breathing trial.  Subsequently he self extubated himself. He was placed on nasal cannula oxygen. Initially presented to the emergency room unresponsive. He had an episode of emesis at home. His lungs sound clear and his chest x-ray is clear. He was intubated in the emergency room for airway protection. Admitted the ICU. Critical care consultations been obtained. He is sedated with propofol. Spontaneous breathing trial.  He apparently took Benadryl and Tylenol. Repeat levels to be obtained and they were normal.  Initial levels were negative.     #IV drug use. Apparently he was in rehab for this and got out. His urine tox is positive for amphetamine. Negative for heroin. Also positive for marijuana.     #Hepatitis C. He has a history of hepatitis C infection.     #Mild leukocytosis. Monitor for now. No obvious signs of infection. Resolved     #Prolonged QT. Avoid QT prolonging agents.     #Acute metabolic encephalopathy. Resolved    #Mild hypokalemia. Correct.     #Lovenox for DVT prophylaxis. Bactroban for MRSA prophylaxis. All questions and concerns were addressed to the patient/family. Alternatives to my treatment were discussed. The note was completed using EMR. Every effort was made to ensure accuracy; however, inadvertent computerized transcription errors may be present.          Kemar Andrade 9:24 AM 12/6/2019

## 2019-12-06 NOTE — CONSULTS
the last week. Has been accusing neighbor of watching her (partner), and earlier in the week seemed to be experiencing auditory and visual halucinations. Duration: Acute on chronic   Severity: Severe  Context: as above  Associated symptoms: as above    Past Psychiatric History:    Minimal treatment aimed at primary psychiatric isseus. Pt was admitted 12 years ago for duration of psychiatric hold for another overdose. He maintains that this is his only other suicide attempt. Patient reports that he has never seen an outpatient psychiatrist or taken psychiatric medications. Past Medical History:  Past Medical History:   Diagnosis Date    Back pain     GERD (gastroesophageal reflux disease)     Hepatitis C     Polysubstance abuse ([facility] [address]     Wears glasses        Home Medications:  Prior to Admission medications    Not on File       Chemical Dependency History:   Patient smokes one half pack per day of tobacco. He denies alcohol. He has opioid addiction as detailed above. He maintains that he has been sober since May of this year. His drug screen was positive for methamphetamines but he adamantly denies using the substance.     Family Mental Health Hx:    Family History   Problem Relation Age of Onset   Cruz Cancer Mother     Heart Disease Father          Social Hx:   Social History     Socioeconomic History    Marital status:      Spouse name: None    Number of children: None    Years of education: None    Highest education level: None   Occupational History    None   Social Needs    Financial resource strain: None    Food insecurity:     Worry: None     Inability: None    Transportation needs:     Medical: None     Non-medical: None   Tobacco Use    Smoking status: Current Every Day Smoker     Packs/day: 1.00     Types: Cigarettes    Smokeless tobacco: Never Used   Substance and Sexual Activity    Alcohol use: No     Alcohol/week: 0.0 standard drinks    Drug use: No     Comment: last used this weekend    Sexual activity: Yes     Partners: Female   Lifestyle    Physical activity:     Days per week: None     Minutes per session: None    Stress: None   Relationships    Social connections:     Talks on phone: None     Gets together: None     Attends Anabaptist service: None     Active member of club or organization: None     Attends meetings of clubs or organizations: None     Relationship status: None    Intimate partner violence:     Fear of current or ex partner: None     Emotionally abused: None     Physically abused: None     Forced sexual activity: None   Other Topics Concern    None   Social History Narrative    None           Current Medications Ordered:   sodium chloride flush  10 mL Intravenous 2 times per day    enoxaparin  40 mg Subcutaneous Daily    famotidine (PEPCID) injection  20 mg Intravenous BID    mupirocin   Nasal BID      PRN Meds: LORazepam, sodium chloride flush, magnesium hydroxide, potassium chloride **OR** potassium alternative oral replacement **OR** potassium chloride, magnesium sulfate, acetaminophen     ROS:  See Medical H&PE      PE:    /82   Pulse 92   Temp 98.1 °F (36.7 °C) (Oral)   Resp 21   Ht 5' 9\" (1.753 m)   Wt 151 lb 14.4 oz (68.9 kg)   SpO2 96%   BMI 22.43 kg/m²       Motor / Gait: Restless, gait deferred  AIMS: 0    Mental Status Examination:    Appearance: WM, appears stated age, lying in bed, wearing hospital gown, Disheveled   Behavior/Attitude toward examiner:  cGenerally cooperative, but intense and dramatic  Speech:  Loud, rapid, multiple expletives, not frankly pressured  Mood:  \"I'm sick of all this shit! \"  Affect:  Labile   Thought processes:  Tangential at times  Thought Content: + SI, denies HI, pt did not voice delusions during our interaction but has reported been paranoid recently  Perceptions: Patient endorses both AVH, but was not RTIS  Attention: Impaired  Abstraction: concrete  Cognition: Average fund of knowledge,

## 2019-12-06 NOTE — PROGRESS NOTES
Awake again and restless. WYNN aggressively and attempts to reach for ETT despite bilateral soft restraints. Propofol to 50 mcg/kg/min.

## 2019-12-06 NOTE — PROGRESS NOTES
12/05/19 1942   Vent Information   $Ventilation $Initial Day   Skin Assessment Clean, dry, & intact   Vent Type 840   Vent Mode AC/VC   Vt Ordered 400 mL   Rate Set 14 bmp   Peak Flow 55 L/min   FiO2  30 %   Sensitivity 3   PEEP/CPAP 5   Humidification Source Heated wire   Humidification Temp 37   Humidification Temp Measured 36.9   Circuit Condensation Drained   Vent Patient Data   Peak Inspiratory Pressure 14 cmH2O   Mean Airway Pressure 8 cmH20   Rate Measured 22 br/min   Vt Exhaled 421 mL   Minute Volume 9.5 Liters   I:E Ratio 1:2.8   Plateau Pressure 11 RSO40   Static Compliance 70 mL/cmH2O   Dynamic Compliance 47 mL/cmH2O   Cough/Sputum   Sputum How Obtained Suctioned;Endotracheal   Cough Productive   Sputum Amount Small   Sputum Color Brown   Tenacity Thick   Spontaneous Breathing Trial (SBT) RT Doc   Pulse 79   SpO2 99 %   Breath Sounds   Right Upper Lobe Diminished   Right Middle Lobe Diminished   Right Lower Lobe Diminished   Left Upper Lobe Diminished   Left Lower Lobe Diminished   Additional Respiratory  Assessments   Resp 20   Position Semi-Sharma's   Alarm Settings   High Pressure Alarm 40 cmH2O   Low Minute Volume Alarm 4.5 L/min   Apnea (secs) 20 secs   High Respiratory Rate 40 br/min   Low Exhaled Vt  250 mL   Non-Surgical Airway Endo Tracheal Tube   Placement Date/Time: 12/05/19 0327   Placed By: In ED  Inserted by: DR John Martinez  Insertion attempts: 1  Airway Device: Endo Tracheal Tube  Size: 8   Secured at 24 cm   Measured From Lips   Secured Location Left   Secured By Commercial tube lieberman   Site Condition Dry

## 2019-12-06 NOTE — CARE COORDINATION
INTERDISCIPLINARY PLAN OF CARE CONFERENCE    Date/Time: 12/6/2019 12:06 PM  Completed by: Melonie Kemp, Case Management      Patient Name:  Arben Aguila  YOB: 1980  Admitting Diagnosis: Acute respiratory failure ([address] [J96.00]     Admit Date/Time:  12/5/2019  3:14 AM    Chart reviewed. Interdisciplinary team met to discuss patient progress and discharge plans. Disciplines included Case Management, Nursing, and Dietitian. Current Status: ICU monitoring, still with confusion. PT/OT recommendation: n/a    Anticipated Discharge Date:  TBD  Expected D/C Disposition:  Home  Confirmed plan with patient and/or family Yes, pt and pt mother at bedside  Discharge Plan Comments: reviewed chart and met with pt and pt mother at bedside. Pt still having some confusion but states that he is active with [facility]. Pt and pt mother states that pt is to return home at discharge. Resource folder provided. Pt/mother states will follow up with [facility]. CM to follow.      Home O2 in place on admit: No  Pt informed of need to bring portable home O2 tank on day of discharge for nursing to connect prior to leaving:  Not Indicated  Verbalized agreement/Understanding:  Not Indicated

## 2019-12-06 NOTE — PROGRESS NOTES
12/06/19 0313   Vent Information   Vent Type 840   Vent Mode AC/VC   Vt Ordered 400 mL   Rate Set 14 bmp   Peak Flow 55 L/min   FiO2  30 %   Sensitivity 3   PEEP/CPAP 5   Humidification Source Heated wire   Humidification Temp 37   Humidification Temp Measured 36.2   Circuit Condensation Drained   Vent Patient Data   Peak Inspiratory Pressure 14 cmH2O   Mean Airway Pressure 8.3 cmH20   Rate Measured 19 br/min   Vt Exhaled 428 mL   Minute Volume 9.82 Liters   I:E Ratio 1:3.7   Cough/Sputum   Sputum How Obtained Suctioned;Endotracheal   Cough Non-productive   Spontaneous Breathing Trial (SBT) RT Doc   Pulse 68   SpO2 100 %   Breath Sounds   Right Upper Lobe Clear   Right Middle Lobe Clear   Right Lower Lobe Clear   Left Upper Lobe Clear   Left Lower Lobe Clear   Additional Respiratory  Assessments   Resp 19   Position Semi-Sharma's   Alarm Settings   High Pressure Alarm 40 cmH2O   Low Minute Volume Alarm 4.5 L/min   Apnea (secs) 20 secs   High Respiratory Rate 40 br/min   Low Exhaled Vt  250 mL   Non-Surgical Airway Endo Tracheal Tube   Placement Date/Time: 12/05/19 0327   Placed By: In ED  Inserted by: DR Dilma Akins  Insertion attempts: 1  Airway Device: Endo Tracheal Tube  Size: 8   Secured at 24 cm   Measured From [facility] By Commercial tube lieberman   Site Condition Dry

## 2019-12-06 NOTE — PROGRESS NOTES
Pulmonary & Critical Care Medicine ICU Progress Note    CC: Respiratory failure    Events of Last 24 hours:   Self extubated this am during rounds. Observed at the bedside and patient remained hemodynamically stable. Somewhat stridorous with hoarseness at the beginning which subsequently was resolved. Emotional in tears  He admitted on taking 20 tablet of benadryl       Invasive Lines: IV: Femoral line       Vent Mode: AC/VC Rate Set: 14 bmp/Vt Ordered: 400 mL/ /FiO2 : 30 %  Recent Labs     19  0457 19  0645   PHART 7.469* 7.357   [id number] 34.7* 44.0   PO2ART 111.7* 104.8       IV:   propofol 70 mcg/kg/min (19 0616)    sodium chloride 100 mL/hr at 19 4586       Vitals:  Blood pressure 110/65, pulse 68, temperature 98.5 °F (36.9 °C), temperature source Oral, resp. rate 19, height 5' 9\" (1.753 m), weight 151 lb 14.4 oz (68.9 kg), SpO2 100 %. on room air  Temp  Av.5 °F (36.9 °C)  Min: 97.8 °F (36.6 °C)  Max: 99.8 °F (37.7 °C)    Intake/Output Summary (Last 24 hours) at 2019 0727  Last data filed at 2019 0600  Gross per 24 hour   Intake 2649.67 ml   Output 1756 ml   Net 893.67 ml     EXAM:  Gen: No distress. Eyes: PERRL. No sclera icterus. No conjunctival injection. ENT: No discharge. Pharynx clear. Neck: Trachea midline. No obvious mass. Resp: No accessory muscle use. No crackles. No wheezes. No rhonchi. No dullness on percussion. CV: Regular rate. Regular rhythm. No murmur or rub. No edema. Peripheral pulses are 2+. Capillary refill is less than 3 seconds. GI: Non-tender. Non-distended. No hernia. Skin: Warm and dry. No nodule on exposed extremities. Lymph: No cervical LAD. No supraclavicular LAD. M/S: No cyanosis. No joint deformity. No clubbing. Neuro: Alert and awake. Followed commands.    Psych: No agitation        Scheduled Meds:   sodium chloride flush  10 mL Intravenous 2 times per day    enoxaparin  40 mg Subcutaneous Daily    chlorhexidine

## 2019-12-06 NOTE — PROGRESS NOTES
Continues to require large amounts of sedation to maintain ETT placement. See MAR for sedation given.

## 2019-12-06 NOTE — PLAN OF CARE
Falling star program remains in place. Call light and personal belongings within reach. Frequent visual monitoring continues. Toileting program inplace. Patient assisted in turning/repositioning at least once every 2 hours, and on a prn basis. Monitoring patient skin integrity for skin breakdown, turning and repositioning q2h per protocol. Patient demonstrates turning and repositioning self.

## 2019-12-06 NOTE — PROGRESS NOTES
12/05/19 2311   Vent Information   Vent Type 840   Vent Mode AC/VC   Vt Ordered 400 mL   Rate Set 14 bmp   Peak Flow 55 L/min   FiO2  30 %   Sensitivity 3   PEEP/CPAP 5   Humidification Source Heated wire   Humidification Temp 37   Humidification Temp Measured 37   Circuit Condensation Drained   Vent Patient Data   Peak Inspiratory Pressure 13 cmH2O   Mean Airway Pressure 8 cmH20   Rate Measured 21 br/min   Vt Exhaled 416 mL   Minute Volume 8 Liters   I:E Ratio 1:2.6   Cough/Sputum   Sputum How Obtained Suctioned;Endotracheal   Cough Non-productive   Sputum Amount None   Spontaneous Breathing Trial (SBT) RT Doc   Pulse 76   SpO2 99 %   Breath Sounds   Right Upper Lobe Clear   Right Middle Lobe Clear   Right Lower Lobe Diminished   Left Upper Lobe Clear   Left Lower Lobe Diminished   Additional Respiratory  Assessments   Resp 21   Position Semi-Sharma's   Alarm Settings   High Pressure Alarm 40 cmH2O   Low Minute Volume Alarm 4.5 L/min   Apnea (secs) 20 secs   High Respiratory Rate 40 br/min   Low Exhaled Vt  250 mL   Non-Surgical Airway Endo Tracheal Tube   Placement Date/Time: 12/05/19 0327   Placed By: In ED  Inserted by: DR Kaylin Campbell  Insertion attempts: 1  Airway Device: Endo Tracheal Tube  Size: 8   Secured at 24 cm   Measured From Lips   Secured Location Left   Secured By Commercial tube lieberman   Site Condition Dry

## 2019-12-06 NOTE — PROGRESS NOTES
08:20 Pt off sedation for SBT  08:47 Pt self  extubated, pt BSWR on well and tried tight, however is  very limber was able to bend all way over and self extubate, pt initially   post extubation very agitated yelling \" I wanted that tube out, I told that fat bitch 3 times I wanted that tube out\" pt had some wheezing and stridor initially  as well but within 5 minutes post extubation pt lethargic , sleepy state, BSWR off , bed alarm on for safety , once claim down pt less wheezy with no stridor   09:00 Pt awake a&o to self , time, situation @ times , confused to place and also confused in general believes at times that is a child and in school, mother @ bedside , pt can be physically and verbally aggressive @ times   12:45  While RN on the phone with Psych MD patient yelled out  that he took Benadryl to try and kill himself.  Dr. Aleks Joseph made aware of  pt statement, pt placed on suicide precautions and   @ bedside w/in arms reach at all times   15:54 Dr. Harley Wolf, pysch @ bedside please see MD notes  16:00 Re assessment as charted, pt seems to be less confused and less anxious   19:10 Bedside report given to night shift RN Kameron, pt stable @ handoff , night RN @ bedside

## 2019-12-07 ENCOUNTER — APPOINTMENT (OUTPATIENT)
Dept: GENERAL RADIOLOGY | Age: 39
DRG: 917 | End: 2019-12-07
Payer: COMMERCIAL

## 2019-12-07 ENCOUNTER — HOSPITAL ENCOUNTER (INPATIENT)
Age: 39
LOS: 2 days | Discharge: HOME | DRG: 917 | End: 2019-12-09
Attending: PSYCHIATRY & NEUROLOGY | Admitting: PSYCHIATRY & NEUROLOGY
Payer: COMMERCIAL

## 2019-12-07 VITALS
SYSTOLIC BLOOD PRESSURE: 116 MMHG | RESPIRATION RATE: 17 BRPM | WEIGHT: 151.9 LBS | BODY MASS INDEX: 22.5 KG/M2 | TEMPERATURE: 98.3 F | OXYGEN SATURATION: 96 % | HEIGHT: 69 IN | DIASTOLIC BLOOD PRESSURE: 62 MMHG | HEART RATE: 76 BPM

## 2019-12-07 LAB
ANION GAP SERPL CALCULATED.3IONS-SCNC: 7 MMOL/L (ref 3–16)
BUN BLDV-MCNC: 13 MG/DL (ref 7–20)
CALCIUM SERPL-MCNC: 8.6 MG/DL (ref 8.3–10.6)
CHLORIDE BLD-SCNC: 105 MMOL/L (ref 99–110)
CO2: 23 MMOL/L (ref 21–32)
CREAT SERPL-MCNC: 1.1 MG/DL (ref 0.9–1.3)
EKG ATRIAL RATE: 70 BPM
EKG DIAGNOSIS: NORMAL
EKG P AXIS: 68 DEGREES
EKG P-R INTERVAL: 144 MS
EKG Q-T INTERVAL: 416 MS
EKG QRS DURATION: 98 MS
EKG QTC CALCULATION (BAZETT): 449 MS
EKG R AXIS: 8 DEGREES
EKG T AXIS: 60 DEGREES
EKG VENTRICULAR RATE: 70 BPM
GFR AFRICAN AMERICAN: >60
GFR NON-AFRICAN AMERICAN: >60
GLUCOSE BLD-MCNC: 100 MG/DL (ref 70–99)
POTASSIUM SERPL-SCNC: 3.4 MMOL/L (ref 3.5–5.1)
SODIUM BLD-SCNC: 135 MMOL/L (ref 136–145)
TOTAL CK: 121 U/L (ref 39–308)
TROPONIN: <0.01 NG/ML

## 2019-12-07 PROCEDURE — 36415 COLL VENOUS BLD VENIPUNCTURE: CPT

## 2019-12-07 PROCEDURE — 99238 HOSP IP/OBS DSCHRG MGMT 30/<: CPT | Performed by: INTERNAL MEDICINE

## 2019-12-07 PROCEDURE — 83036 HEMOGLOBIN GLYCOSYLATED A1C: CPT

## 2019-12-07 PROCEDURE — 84484 ASSAY OF TROPONIN QUANT: CPT

## 2019-12-07 PROCEDURE — 2500000003 HC RX 250 WO HCPCS: Performed by: INTERNAL MEDICINE

## 2019-12-07 PROCEDURE — 6370000000 HC RX 637 (ALT 250 FOR IP): Performed by: INTERNAL MEDICINE

## 2019-12-07 PROCEDURE — 80061 LIPID PANEL: CPT

## 2019-12-07 PROCEDURE — 99233 SBSQ HOSP IP/OBS HIGH 50: CPT | Performed by: INTERNAL MEDICINE

## 2019-12-07 PROCEDURE — 1240000000 HC EMOTIONAL WELLNESS R&B

## 2019-12-07 PROCEDURE — 99223 1ST HOSP IP/OBS HIGH 75: CPT | Performed by: PSYCHIATRY & NEUROLOGY

## 2019-12-07 PROCEDURE — 82550 ASSAY OF CK (CPK): CPT

## 2019-12-07 PROCEDURE — 6370000000 HC RX 637 (ALT 250 FOR IP): Performed by: PSYCHIATRY & NEUROLOGY

## 2019-12-07 PROCEDURE — 80048 BASIC METABOLIC PNL TOTAL CA: CPT

## 2019-12-07 PROCEDURE — 93010 ELECTROCARDIOGRAM REPORT: CPT | Performed by: INTERNAL MEDICINE

## 2019-12-07 PROCEDURE — 6360000002 HC RX W HCPCS: Performed by: INTERNAL MEDICINE

## 2019-12-07 PROCEDURE — 93005 ELECTROCARDIOGRAM TRACING: CPT | Performed by: INTERNAL MEDICINE

## 2019-12-07 PROCEDURE — 2580000003 HC RX 258: Performed by: INTERNAL MEDICINE

## 2019-12-07 RX ORDER — BENZTROPINE MESYLATE 1 MG/ML
1 INJECTION INTRAMUSCULAR; INTRAVENOUS 2 TIMES DAILY PRN
Status: DISCONTINUED | OUTPATIENT
Start: 2019-12-07 | End: 2019-12-09 | Stop reason: HOSPADM

## 2019-12-07 RX ORDER — TRAZODONE HYDROCHLORIDE 50 MG/1
25 TABLET ORAL NIGHTLY PRN
Status: DISCONTINUED | OUTPATIENT
Start: 2019-12-07 | End: 2019-12-09 | Stop reason: HOSPADM

## 2019-12-07 RX ORDER — ACETAMINOPHEN 325 MG/1
650 TABLET ORAL EVERY 4 HOURS PRN
Status: DISCONTINUED | OUTPATIENT
Start: 2019-12-07 | End: 2019-12-09 | Stop reason: HOSPADM

## 2019-12-07 RX ORDER — HALOPERIDOL 1 MG/1
2 TABLET ORAL EVERY 6 HOURS PRN
Status: DISCONTINUED | OUTPATIENT
Start: 2019-12-07 | End: 2019-12-09 | Stop reason: HOSPADM

## 2019-12-07 RX ORDER — NICOTINE 21 MG/24HR
1 PATCH, TRANSDERMAL 24 HOURS TRANSDERMAL DAILY PRN
Status: DISCONTINUED | OUTPATIENT
Start: 2019-12-07 | End: 2019-12-09 | Stop reason: HOSPADM

## 2019-12-07 RX ORDER — HALOPERIDOL 5 MG/ML
2 INJECTION INTRAMUSCULAR EVERY 6 HOURS PRN
Status: DISCONTINUED | OUTPATIENT
Start: 2019-12-07 | End: 2019-12-09 | Stop reason: HOSPADM

## 2019-12-07 RX ORDER — MAGNESIUM HYDROXIDE/ALUMINUM HYDROXICE/SIMETHICONE 120; 1200; 1200 MG/30ML; MG/30ML; MG/30ML
30 SUSPENSION ORAL EVERY 6 HOURS PRN
Status: DISCONTINUED | OUTPATIENT
Start: 2019-12-07 | End: 2019-12-09 | Stop reason: HOSPADM

## 2019-12-07 RX ORDER — OLANZAPINE 10 MG/1
10 TABLET ORAL NIGHTLY
Status: DISCONTINUED | OUTPATIENT
Start: 2019-12-07 | End: 2019-12-09 | Stop reason: HOSPADM

## 2019-12-07 RX ORDER — LORAZEPAM 0.5 MG/1
0.5 TABLET ORAL EVERY 6 HOURS PRN
Status: DISCONTINUED | OUTPATIENT
Start: 2019-12-07 | End: 2019-12-09 | Stop reason: HOSPADM

## 2019-12-07 RX ORDER — LORAZEPAM 2 MG/ML
1 INJECTION INTRAMUSCULAR EVERY 6 HOURS PRN
Status: DISCONTINUED | OUTPATIENT
Start: 2019-12-07 | End: 2019-12-09 | Stop reason: HOSPADM

## 2019-12-07 RX ADMIN — ENOXAPARIN SODIUM 40 MG: 100 INJECTION SUBCUTANEOUS at 08:25

## 2019-12-07 RX ADMIN — POTASSIUM BICARBONATE 40 MEQ: 782 TABLET, EFFERVESCENT ORAL at 04:45

## 2019-12-07 RX ADMIN — Medication 10 ML: at 08:26

## 2019-12-07 RX ADMIN — FAMOTIDINE 20 MG: 10 INJECTION, SOLUTION INTRAVENOUS at 08:25

## 2019-12-07 RX ADMIN — OLANZAPINE 10 MG: 10 TABLET, FILM COATED ORAL at 21:54

## 2019-12-07 ASSESSMENT — SLEEP AND FATIGUE QUESTIONNAIRES
DIFFICULTY STAYING ASLEEP: YES
DIFFICULTY ARISING: NO
DO YOU USE A SLEEP AID: NO
DO YOU HAVE DIFFICULTY SLEEPING: YES
SLEEP PATTERN: RESTLESSNESS;DIFFICULTY FALLING ASLEEP
AVERAGE NUMBER OF SLEEP HOURS: 4
DIFFICULTY FALLING ASLEEP: YES
RESTFUL SLEEP: NO

## 2019-12-07 ASSESSMENT — LIFESTYLE VARIABLES: HISTORY_ALCOHOL_USE: NO

## 2019-12-07 ASSESSMENT — PATIENT HEALTH QUESTIONNAIRE - PHQ9: SUM OF ALL RESPONSES TO PHQ QUESTIONS 1-9: 20

## 2019-12-07 NOTE — PROGRESS NOTES
Pulmonary & Critical Care Medicine ICU Progress Note    CC: Respiratory failure    Events of Last 24 hours:   RA  Plan for psych admission        Invasive Lines: IV: PIVs      Vent Mode: AC/VC Rate Set: 14 bmp/Vt Ordered: 400 mL/ /FiO2 : 30 %  Recent Labs     19  0457 19  0645   PHART 7.469* 7.357   [id number] 34.7* 44.0   PO2ART 111.7* 104.8       IV:   sodium chloride 100 mL/hr at 19 4018       Vitals:  Blood pressure 108/63, pulse 67, temperature 98.1 °F (36.7 °C), temperature source Oral, resp. rate 18, height 5' 9\" (1.753 m), weight 151 lb 14.4 oz (68.9 kg), SpO2 94 %. on room air  Temp  Av.3 °F (36.8 °C)  Min: 97 °F (36.1 °C)  Max: 99.2 °F (37.3 °C)    Intake/Output Summary (Last 24 hours) at 2019 0657  Last data filed at 2019 1102  Gross per 24 hour   Intake --   Output 250 ml   Net -250 ml     EXAM:  Gen: No distress. Eyes: PERRL. No sclera icterus. No conjunctival injection. ENT: No discharge. Pharynx clear. Neck: Trachea midline. No obvious mass. Resp: No accessory muscle use. No crackles. No wheezes. No rhonchi. No dullness on percussion. CV: Regular rate. Regular rhythm. No murmur or rub. No edema. GI: Non-tender. Non-distended. No hernia. Skin: Warm and dry. No nodule on exposed extremities. Lymph: No cervical LAD. No supraclavicular LAD. M/S: No cyanosis. No joint deformity. No clubbing. Neuro: AAOx3. Followed commands.    Psych: No agitation        Scheduled Meds:   OLANZapine  10 mg Oral Nightly    sodium chloride flush  10 mL Intravenous 2 times per day    enoxaparin  40 mg Subcutaneous Daily    famotidine (PEPCID) injection  20 mg Intravenous BID    mupirocin   Nasal BID     PRN Meds:  LORazepam, sodium chloride flush, magnesium hydroxide, potassium chloride **OR** potassium alternative oral replacement **OR** potassium chloride, magnesium sulfate, acetaminophen    Results:  CBC:   Recent Labs     19  0429 19  0540   WBC 12.6*

## 2019-12-07 NOTE — PROGRESS NOTES
Internal Medicine ICU Progress Note      Events of Last 24 hours:     He self extubated yesterday      Invasive Lines:  CVC right femoral line        MV: self extubated himself on 19    Recent Labs     19  0457 19  0645   PHART 7.469* 7.357   [id number] 34.7* 44.0   PO2ART 111.7* 104.8       MV Settings:  Vent Mode: AC/VC Rate Set: 14 bmp/Vt Ordered: 400 mL/ /FiO2 : 30 %    IV:   sodium chloride 100 mL/hr at 19 1429       Vitals:  Temp  Av.5 °F (36.9 °C)  Min: 98.1 °F (36.7 °C)  Max: 99.2 °F (37.3 °C)  Pulse  Av.9  Min: 63  Max: 115  BP  Min: 108/63  Max: 143/84  SpO2  Av.9 %  Min: 94 %  Max: 99 %  Patient Vitals for the past 4 hrs:   BP Temp Temp src Pulse Resp SpO2   19 0800 116/62 98.3 °F (36.8 °C) Oral 63 17 97 %   19 0700 115/69 -- -- 67 18 94 %       CVP:        Intake/Output Summary (Last 24 hours) at 2019 0933  Last data filed at 2019 1102  Gross per 24 hour   Intake --   Output 250 ml   Net -250 ml       EXAM:  General: Following commands on the ventilator. Subsequently self extubated himself. Eyes: PERRL. No sclera icterus. No conjunctiva injected. ENT: No discharge. Pharynx clear. Neck: Trachea midline. Normal thyroid. Resp: No accessory muscle use. No crackles. + wheezing. No rhonchi. No dullness on percussion. CV: Regular rate. Regular rhythm. No mumur or rub. No edema. No JVD. Palpable pedal pulses. GI: Non-tender. Non-distended. No masses. No organmegaly. Normal bowel sounds. No hernia. Skin: Warm and dry. No nodule on exposed extremities. No rash on exposed extremities. Lymph: No cervical LAD. No supraclavicular LAD. M/S: No cyanosis. No joint deformity. No clubbing. Neuro: He is on the ventilator. Following simple commands.   Psych: ANJEL    Medications:  Scheduled Meds:   OLANZapine  10 mg Oral Nightly    sodium chloride flush  10 mL Intravenous 2 times per day    enoxaparin  40 mg Subcutaneous Daily    famotidine (PEPCID) injection  20 mg Intravenous BID    mupirocin   Nasal BID       PRN Meds:  LORazepam, sodium chloride flush, magnesium hydroxide, potassium chloride **OR** potassium alternative oral replacement **OR** potassium chloride, magnesium sulfate, acetaminophen    Results:  CBC:   Recent Labs     12/05/19  0429 12/06/19  0540   WBC 12.6* 10.4   HGB 16.7 12.5*   HCT 50.8 37.2*   MCV 95.8 97.1    214     BMP:   Recent Labs     12/06/19  1147 12/06/19  1752 12/07/19  0348    138 135*   K 3.6 3.4* 3.4*    106 105   CO2 23 24 23   BUN 16 15 13   CREATININE 1.3 1.1 1.1     LIVER PROFILE:   Recent Labs     12/05/19  0429 12/06/19  0540   AST 36 18   ALT 34 17   BILITOT 0.5 <0.2   ALKPHOS 80 53     PT/INR: No results for input(s): PROTIME, INR in the last 72 hours. APTT: No results for input(s): APTT in the last 72 hours. UA:  Recent Labs     12/05/19  0500   COLORU Yellow   PHUR 7.5  7.5   CLARITYU Clear   SPECGRAV 1.010   LEUKOCYTESUR Negative   UROBILINOGEN 0.2   BILIRUBINUR Negative   BLOODU Negative   GLUCOSEU Negative     Results for Krystal Lantigua (MRN [id number]) as of 12/6/2019 09:26   Ref.  Range 12/5/2019 04:57 12/6/2019 06:45   O2 Therapy Unknown Unknown See comment   Hemoglobin, Art, Extended Latest Ref Range: 13.5 - 17.5 g/dL 15.0 12.9 (L)   pH, Arterial Latest Ref Range: 7.350 - 7.450  7.469 (H) 7.357   pCO2, Arterial Latest Ref Range: 35.0 - 45.0 mmHg 34.7 (L) 44.0   pO2, Arterial Latest Ref Range: 75.0 - 108.0 mmHg 111.7 (H) 104.8   HCO3, Arterial Latest Ref Range: 21.0 - 29.0 mmol/L 24.6 24.1   TCO2 (calc), Art Latest Ref Range: Not Established mmol/L 25.7 25.5   Base Excess, Arterial Latest Ref Range: -3.0 - 3.0 mmol/L 1.5 -1.5   O2 Sat, Arterial Latest Ref Range: >92 % 98.4 97.6   O2 Content, Arterial Latest Ref Range: Not Established mL/dL 21 18   Methemoglobin, Arterial Latest Ref Range: <1.5 % 0.2 0.2   Carboxyhgb, Arterial Latest Ref Range: 0.0 - 1.5 % 3.1 (H) 1.0   Results for and Tylenol. Repeat levels to be obtained and they were normal.  Initial levels were negative.     #IV drug use. Apparently he was in rehab for this and got out. His urine tox is positive for amphetamine. Negative for heroin. Also positive for marijuana.     #Hepatitis C. He has a history of hepatitis C infection.     #Mild leukocytosis. Monitor for now. No obvious signs of infection. Resolved     #Prolonged QT. Avoid QT prolonging agents.     #Acute metabolic encephalopathy. Resolved    #Mild hypokalemia. Correct.     #Lovenox for DVT prophylaxis. Bactroban for MRSA prophylaxis.     D/c to psych    Dean Cowan 9:33 AM 12/7/2019

## 2019-12-07 NOTE — H&P
Psychiatry Initial Evaluation    Admission Date:    12/7/2019    Chief complaint / Reason for Admission:  Suicide attempt    HPI:   Patient is a 42-year-old male with a history of opioid use disorder, severe, dependents who was seen by this provider yesterday in the ICU for evaluation of a suicide attempt via multiple drug ingestion. Per my documentation yesterday:    Patient was admitted to ICU after being found down. Self-extubated this morning. Pt admitted that he intentionally overdosed on sudafed and benadryl the night prior to presentation in suicide attempt.     On interview, pt is labile and highly emotive. His speech is rapid and loud with an irritable tone. Pt reports taht he feels as though \"I'm a fuck up and I'll always be a fuck up\" and that his life is hopeless. He repeated several times \"I'm just sick of this world, and sick of being here. I'm done with it! \"  Initially it was difficult to get him to calm down to provide details of what happened.     Eventually pt revealed that he has struggled for many years with addiction to heroin and subsequent legal troubles and social dysfunction. Pt has been in halfway multiple times for drug and theft related charges. He has been addicted to IV heroin since age 25. Due to his addiction issues and legal problems, he cannot hold a steady job. Lives with his mother and is currently unemployed. He is  but relationship is off and on due to the above issues.     Pt reports that last week his wife\" left me again\" after which point he started thinking about suicide. He began to feel more hopeless on his birthday thinking about Polina Sampson been a failure\" and decided to overdose. He remains hopeless and feels that his life is worthlees.     I spoke with his partner who provided additional background.   Pt evidently was court ordered to a 180 day treatment program and completed one month of this through October despite claiming to have been sober since Behavior/Attitude toward examiner:  Generally cooperative, but intense and dramatic  Speech:  Loud, rapid, multiple expletives, not frankly pressured  Mood: \"Terrible\"  Affect:  Labile   Thought processes:  Tangential at times  Thought Content: + SI, denies HI, pt did not voice delusions during our interaction but has reported been paranoid recently  Perceptions: Patient endorses both AVH, but was not RTIS  Attention: Impaired  Abstraction: concrete  Cognition: Average fund of knowledge, Alert and oriented to person, place, time, and situation, immediate, recent, and remote recall grossly intact  Insight: impaired insight   Judgment: impaired judgment     LAB:   No results displayed because visit has over 200 results. Assessment and Plan:    Diagnoses:   Primary Psychiatric (DSM V) Diagnosis: Unspecified mood disorder  Secondary Psychiatric (DSM V) Diagnoses: Unspecified psychotic disorder  Chemical Dependency Diagnoses: Opioid use disorder, in early remission    All conditions detailed above are being treated while patient is hospitalized. Tx plan: Generally: prevent self injury/aggression, stabilize mood/anxiety/psychotic/behavioral disturbance, establish/maintain aftercare, increase coping mechanisms, improve medication compliance. All conditions present on admission are being treated while pt is hospitalized. Legal Status: Involuntary    Primary Psychiatric Issues:  1. Unspecified mood and psychotic disorders:  -continue Zyprexa 10 mg QHS. Chemical Dependency Issues:  Patient would benefit most from inpatient chemical dependency treatment but as detailed in HPI it seems that he is resistant to this. Will continue to explore. Medical Problems:  Internal medicine has been consulted. Appreciate recs.       Code Status: Full code    Disposition:    -Housing: Homeless  -Current outpatient follow-up: None    Estimated length of stay: 3 to 5 days      Criteria for Discharge:  Not

## 2019-12-07 NOTE — BH NOTE
`[facility]  Admission Note     Admission Type:   Admission Type: Voluntary    Reason for admission:  Reason for Admission: \" its not worth living anymore. \"     PATIENT STRENGTHS:  Strengths: Communication, Positive Support    Patient Strengths and Limitations:  Limitations: Tendency to isolate self, Hopeless about future    Addictive Behavior:   Addictive Behavior  In the past 3 months, have you felt or has someone told you that you have a problem with:  : None  Do you have a history of Chemical Use?: No  Do you have a history of Alcohol Use?: No  Do you have a history of Street Drug Abuse?: No(been sober since5/2019)  Histroy of Prescripton Drug Abuse?: No    Medical Problems:   Past Medical History:   Diagnosis Date    Back pain     GERD (gastroesophageal reflux disease)     Hepatitis C     Polysubstance abuse ([address]     Wears glasses        Status EXAM:  Status and Exam  Normal: No  Facial Expression: Expressionless, Flat, Sad  Affect: Blunt  Level of Consciousness: Alert  Mood:Normal: No  Mood: Depressed, Anxious, Sad  Motor Activity:Normal: No  Motor Activity: Decreased  Interview Behavior: Cooperative, Evasive  Preception: [place] to Person, [place] to Place, [place] to Situation  Thought Processes: Circumstantial  Thought Content:Normal: Yes  Thought Content: Poverty of Content  Hallucinations: None  Delusions: No  Memory:Normal: Yes  Insight and Judgment: No  Insight and Judgment: Poor Insight, Poor Judgment  Present Suicidal Ideation: Yes  Present Homicidal Ideation: No    Tobacco Screening:  Practical Counseling, on admission, jacobo X, if applicable and completed (first 3 are required if patient doesn't refuse):             ( X)  Recognizing danger situations (included triggers and roadblocks)                    ( )  Coping skills (new ways to manage stress, exercise, relaxation techniques, changing routine, distraction)                                                           ( )  Basic information about quitting (benefits of quitting, techniques in how to quit, available resources  ( ) Referral for counseling faxed to [facility]                                           ( ) Patient refused counseling  ( ) Patient has not smoked in the last 30 days    Metabolic Screening:    No results found for: LABA1C    Lab Results   Component Value Date    CHOL 169 03/22/2016     Lab Results   Component Value Date    TRIG 68 03/22/2016     Lab Results   Component Value Date    HDL 62 (H) 03/22/2016     No components found for: LDLCAL  Lab Results   Component Value Date    LABVLDL 14 03/22/2016         There is no height or weight on file to calculate BMI. BP Readings from Last 2 Encounters:   12/07/19 116/62   12/07/19 120/66           Pt admitted with followings belongings:  Dentures: None  Vision - Corrective Lenses: None  Hearing Aid: None  Jewelry: None  Body Piercings Removed: N/A  Clothing: None  Were All Patient Medications Collected?: Not Applicable  Other Valuables: None      Patient oriented to surroundings and program expectations and copy of patient rights given. Received admission packet:  YES. Consents reviewed, signed YES. Patient verbalize understanding:  YEs.     Patient education on precautions: Tiago Berry RN

## 2019-12-07 NOTE — PROGRESS NOTES
Received call from [facility] stating patient requires pre-cert for admission to [facility]. Placed call to case management with no answer, left message to return call. Lenny, Clinical made aware.

## 2019-12-07 NOTE — PROGRESS NOTES
Results for Emmy Arnett (MRN [id number]) as of 12/7/2019 04:30   Ref. Range 12/7/2019 03:48   Potassium Latest Ref Range: 3.5 - 5.1 mmol/L 3.4 (L)       Potassium replaced per PRN protocol.

## 2019-12-07 NOTE — FLOWSHEET NOTE
12/07/19 1649   Encounter Summary   Services provided to: Patient   Referral/Consult From: [facility] Unknown   Continue Visiting   (12/7 Initial visit/prayer w/pt in ICU.)   Complexity of Encounter High   Length of Encounter 15 minutes   Spiritual Assessment Completed Yes   Spiritual/Zoroastrianism   Type Spiritual struggle   Assessment Calm; Approachable;Passive  (\"Sick of living\" & didn't really want to talk about it.)   Intervention Active listening;Explored feelings, thoughts, concerns;Nurtured hope;Prayer   Outcome Comfort;Expressed gratitude

## 2019-12-07 NOTE — PROGRESS NOTES
Spoke with Rod Taylor, RN case manager- unsure of what [facility] is wanting as far as \"pre-cert\". Spoke with GRIFFIN Galvez - okay to send patient and [facility] will address pre-cert issue. Troponin noted to be negative, per Dr. Tawanda Bravo okay to send patient to [facility].

## 2019-12-07 NOTE — PROGRESS NOTES
Patient complaining of headache, anxiousness, and tremors. Patient is very tearful. PRN ativan given at this time. Will continue to monitor.

## 2019-12-07 NOTE — PROGRESS NOTES
Spoke with Caretha Bloch in [facility]. Need EKG today before transfer to [facility]. Order placed.

## 2019-12-08 LAB
CHOLESTEROL, TOTAL: 87 MG/DL (ref 0–199)
ESTIMATED AVERAGE GLUCOSE: 116.9 MG/DL
HBA1C MFR BLD: 5.7 %
HDLC SERPL-MCNC: 40 MG/DL (ref 40–60)
LDL CHOLESTEROL CALCULATED: 33 MG/DL
TRIGL SERPL-MCNC: 70 MG/DL (ref 0–150)
URINE CULTURE, ROUTINE: NORMAL
VLDLC SERPL CALC-MCNC: 14 MG/DL

## 2019-12-08 PROCEDURE — 6370000000 HC RX 637 (ALT 250 FOR IP): Performed by: PSYCHIATRY & NEUROLOGY

## 2019-12-08 PROCEDURE — 1240000000 HC EMOTIONAL WELLNESS R&B

## 2019-12-08 RX ADMIN — TRAZODONE HYDROCHLORIDE 25 MG: 50 TABLET ORAL at 00:38

## 2019-12-08 RX ADMIN — OLANZAPINE 10 MG: 10 TABLET, FILM COATED ORAL at 22:11

## 2019-12-08 ASSESSMENT — LIFESTYLE VARIABLES: HISTORY_ALCOHOL_USE: NO

## 2019-12-08 ASSESSMENT — SLEEP AND FATIGUE QUESTIONNAIRES
DIFFICULTY FALLING ASLEEP: YES
DO YOU HAVE DIFFICULTY SLEEPING: YES
AVERAGE NUMBER OF SLEEP HOURS: 3
SLEEP PATTERN: DIFFICULTY FALLING ASLEEP;DISTURBED/INTERRUPTED SLEEP;NIGHTMARES/TERRORS
RESTFUL SLEEP: NO
DIFFICULTY ARISING: YES
DIFFICULTY STAYING ASLEEP: YES
DO YOU USE A SLEEP AID: NO

## 2019-12-08 ASSESSMENT — PATIENT HEALTH QUESTIONNAIRE - PHQ9: SUM OF ALL RESPONSES TO PHQ QUESTIONS 1-9: 22

## 2019-12-08 NOTE — BH NOTE
Art Therapist met with pt to perform leisure assessment. Pt appeared depressed and irritable, but was able to identify/share: leisure interests, strengths, limitations, and goals.     Kristen Dimas, ATR-BC, LPAT

## 2019-12-08 NOTE — PLAN OF CARE
Problem: Depressive Behavior With or Without Suicide Precautions:  Goal: Able to verbalize and/or display a decrease in depressive symptoms  Description  Able to verbalize and/or display a decrease in depressive symptoms  Outcome: Ongoing  Note:   Out in milieu to watch TV and eat meals. Did not stay to attend community meeting. Affect is flat. Constricted. During interaction did make eye contact. Min interaction with peers.  Withdrawn

## 2019-12-08 NOTE — GROUP NOTE
Group Therapy Note    Date: 12/8/2019    Group Start Time: 9934  Group End Time: 3510  Group Topic: Healthy Living/Wellness    [address] [place] [address] AMAIRANI De La Cruz        Group Therapy Note    Attendees: 13         Patient's Goal:   Patients will demonstrate teamwork, and socialization skills while engaging in an activity of ball tossing in a circular pattern while being present in the here and now. Patients will participate in a stressful game and realize some stress can be helpful. Note:  Patient sat on the outside of the group and watched.      Status After Intervention:  Unchanged    Participation Level: None    Participation Quality: Attentive      Speech:  mute      Thought Process/Content: Logical  Linear      Affective Functioning: Constricted/Restricted      Mood: euthymic      Level of consciousness:  Alert and Attentive      Response to Learning: Progressing to goal      Endings: None Reported    Modes of Intervention: Education, Socialization, Activity and Movement      Discipline Responsible: Registered Nurse      Signature:  Chava Melvin RN

## 2019-12-08 NOTE — DISCHARGE SUMMARY
positive for marijuana.     #Hepatitis Jadyn Washington has a history of hepatitis C infection.     #Mild leukocytosis.  Monitor for now.  No obvious signs of infection. Resolved     #Prolonged QT.  Avoid QT prolonging agents.     #Acute metabolic encephalopathy. Resolved     #Mild hypokalemia.  Correct.       Procedures (Please Review Full Report for Details)  Endotracheal intubation     Consults    intensivist  Psychiatry       Physical Exam at Discharge:    /62   Pulse 76   Temp 98.3 °F (36.8 °C) (Oral)   Resp 17   Ht 5' 9\" (1.753 m)   Wt 151 lb 14.4 oz (68.9 kg)   SpO2 96%   BMI 22.43 kg/m²     General: Following commands on the ventilator. Subsequently self extubated himself. Eyes: PERRL. No sclera icterus. No conjunctiva injected. ENT: No discharge. Pharynx clear. Neck: Trachea midline. Normal thyroid. Resp: No accessory muscle use. No crackles. + wheezing. No rhonchi. No dullness on percussion. CV: Regular rate. Regular rhythm. No mumur or rub. No edema. No JVD. Palpable pedal pulses. GI: Non-tender. Non-distended. No masses. No organmegaly. Normal bowel sounds. No hernia. Skin: Warm and dry. No nodule on exposed extremities. No rash on exposed extremities. Lymph: No cervical LAD. No supraclavicular LAD. M/S: No cyanosis. No joint deformity. No clubbing. Neuro: He is on the ventilator. Following simple commands. Psych: ANJEL    CBC:   Recent Labs     12/06/19  0540   WBC 10.4   HGB 12.5*   HCT 37.2*   MCV 97.1        BMP:   Recent Labs     12/06/19  1147 12/06/19  1752 12/07/19  0348    138 135*   K 3.6 3.4* 3.4*    106 105   CO2 23 24 23   BUN 16 15 13   CREATININE 1.3 1.1 1.1     LIVER PROFILE:   Recent Labs     12/06/19  0540   AST 18   ALT 17   BILITOT <0.2   ALKPHOS 53         RADIOLOGY  XR CHEST PORTABLE   Final Result   No developing pneumonia or edema         XR CHEST PORTABLE   Final Result   Supportive tubing is in normal position, including orogastric tube. No acute cardiopulmonary disease. XR PELVIS (1-2 VIEWS)   Final Result   1. Right femoral catheter tip overlies the right common iliac vein. CT head without contrast   Final Result   No acute intracranial abnormality. XR CHEST PORTABLE   Final Result   Appropriate endotracheal tube positioning. Proximal esophagogastric tube positioning. Recommend advancing 10-15 cm. Clear lungs. Discharge Medications     Medication List      You have not been prescribed any medications. Dispo: transfer in stable condition to inpatient psychiatric unit     MIHAELA Pham.

## 2019-12-09 VITALS
RESPIRATION RATE: 16 BRPM | SYSTOLIC BLOOD PRESSURE: 135 MMHG | HEART RATE: 92 BPM | TEMPERATURE: 98.3 F | DIASTOLIC BLOOD PRESSURE: 69 MMHG

## 2019-12-09 PROBLEM — F32.A DEPRESSION: Status: ACTIVE | Noted: 2019-12-09

## 2019-12-09 PROCEDURE — 6370000000 HC RX 637 (ALT 250 FOR IP): Performed by: PSYCHIATRY & NEUROLOGY

## 2019-12-09 PROCEDURE — 97165 OT EVAL LOW COMPLEX 30 MIN: CPT

## 2019-12-09 PROCEDURE — 97535 SELF CARE MNGMENT TRAINING: CPT

## 2019-12-09 PROCEDURE — 99239 HOSP IP/OBS DSCHRG MGMT >30: CPT | Performed by: PSYCHIATRY & NEUROLOGY

## 2019-12-09 RX ORDER — OLANZAPINE 10 MG/1
10 TABLET ORAL NIGHTLY
Qty: 30 TABLET | Refills: 0 | Status: SHIPPED | OUTPATIENT
Start: 2019-12-09

## 2019-12-09 NOTE — DISCHARGE SUMMARY
Discharge Summary   Admit Date: 12/7/2019   Discharge Date:    12/9/2019  Spent over 40 minutes with patient and staff on [facility]   Final Dx:   Axis I: depression unspecified, IED, opiate dependence  Axis II: aspd  Axis III: see medical hx  Axis IV: poor coping skills, relational problems, substance abuse, legal problems. Condition on DC  Mood and affect are stable and pt is not suicidal   VITALS:  /69   Pulse 92   Temp 98.3 °F (36.8 °C) (Oral)   Resp 16   Brief Summary Present Illness    Pt reports taht he feels as though \"I'm a fuck up and I'll always be a fuck up\" and that his life is hopeless.  He repeated several times \"I'm just sick of this world, and sick of being here.  I'm done with it!\"  Initially it was difficult to get him to calm down to provide details of what happened.     Eventually pt revealed that he has struggled for many years with addiction to heroin and subsequent legal troubles and social dysfunction.  Pt has been in [facility] multiple times for drug and theft related charges. Manuel Booker has been addicted to IV heroin since age 25.  Due to his addiction issues and legal problems, he cannot hold a steady job. Cleatis Bathe with his mother and is currently unemployed. Manuel Jhony is  but relationship is off and on due to the above issues.     Pt reports that last week his wife\" left me again\" after which point he started thinking about suicide. Manuel Jhony began to feel more hopeless on his birthday thinking about Ryan Kandace I've been a failure\" and decided to overdose. Manuel Alamo remains hopeless and feels that his life is worthlees. Hospital Course Pt was admitted after od on benadryl. Pt appears future oriented and since starting zyprexa his mood appears to have stabilize. Pt stated that main problem is that he gets anxious around ppl due to have been in and out of [facility] and doesn't like to be confined. Pt stated that he is good with wife right now and he has support from family.  Pt does not appears to getting any benefit from hospitalization since he is not attending grps. Pt not holdable or probateable. Patient appears to be in stable condition and close to their baseline functioning. The patient denies suicidal or homicidal ideations and is showing future orientation. Patient no longer presented an imminent risk of danger to themselves and/or others. At the time of discharge it appears that the patient has received the maximum medical benefit from this hospitalization and can be appropriately managed with community treatment.       PE: (reviewed) and labs (see medical H&PE)  Labs:    Admission on 12/07/2019   Component Date Value Ref Range Status    Hemoglobin A1C 12/07/2019 5.7  See comment % Final    Comment: Comment:  Diagnosis of Diabetes: > or = 6.5%  Increased risk of diabetes (Prediabetes): 5.7-6.4%  Glycemic Control: Nonpregnant Adults: <7.0%                    Pregnant: <6.0%        eAG 12/07/2019 116.9  mg/dL Final    Cholesterol, Total 12/07/2019 87  0 - 199 mg/dL Final    Triglycerides 12/07/2019 70  0 - 150 mg/dL Final    HDL 12/07/2019 40  40 - 60 mg/dL Final    LDL Calculated 12/07/2019 33  <100 mg/dL Final    VLDL Cholesterol Calculated 12/07/2019 14  Not Established mg/dL Final        Mental Status Exam at Discharge:  Level of consciousness:  awake  Appearance:  well-appearing, in chair, good grooming and good hygiene well-developed, well-nourished  Behavior/Motor:  no abnormalities noted normal gait and station AIMS: 0  Attitude toward examiner:  cooperative, attentive and good eye contact  Speech:  spontaneous, normal rate, normal volume and well articulated  Mood:  dysthymic  Affect:  mood congruent Anxiety: mild  Hallucinations: Absent  Thought processes:  coherent Attention span, Concentration & Attention:  attention span and concentration were age appropriate  Thought content:  Reality based no evidence of delusions OCD: none    Insight: normal insight and judgment Cognition:  oriented

## 2019-12-09 NOTE — PROGRESS NOTES
Inpatient Occupational Therapy  Evaluation and Treatment    Unit:  [facility]  Date:  12/9/2019  Patient Name:    Nadine Samaniego  Admitting diagnosis:  Mood Disorder  Admit Date:  12/7/2019  Precautions/Restrictions/WB Status/ Lines/ Wounds/ Oxygen:  Up as tolerated  Treatment Time:  10:38-11:17  Treatment Number:  1    Patient Goals for Therapy:  \" Find how to be stable. ..talk to people about finances and job. ..things like that. \"      Discharge Recommendations:   Home with assist prn    DME needs for discharge:   NA     AM-PAC Score: 24     Home Health S4 Level:  NA       ACLS:  Mode 5.2  Engaging Abilities and Following Safety Precautions When the Person Can Learn to Improve the Fine Details of Actions     DESCRIPTION:     18% Cognitive Assistance: The person may live alone with weekly checks to monitor home safety and assist with finances. 18% standby cognitive assistance is required to anticipate hazards and prevent social conflict. Individual preferences may be honored in improving the appearance of material objects. 4% Physical Assistance is required for fine motor actions. Preadmission Environment:    Pt. Lives   with mother. Preadmission Status / PLOF:  History of falls   No  Pt. Able to drive   Yes; mother provides transportation    Pt Fully independent for ADLs/IADLs. Yes     Pt. Fully independent for transfers and gait and walked with: No Device    Sleep Hygiene:  4-6 hours nightly; fragmented sleep  Income:  Unemployed; carter by trade  Financial Management:  Mother assist  Leisure Interests:  Spending time with dog, walking, fishing, hiking, movies, cards, spending time with his 2 kids. Medication Management:  Self; Pt. Reports no difficulty. Health Management:  Pt. Reports no PCP, Psychiatrist or therapist.  Social Network: Wife and mother, Yazidi  Stressors:  1. Finding work. 2. Staying clean.   3.  relationships  Coping Skills:  Praying, reading bible, staying positive    Pain  Yes identify 2 memory strategies to take medications as prescribed. Rehabilitation Potential:  Good for goals listed above. Strengths for achieving goals include:  PLOF  Barriers to achieving goals include:     Plan: To be seen 2-5x/week while in acute care setting for therapeutic exercises/act, ADL retraining, NMR and patient/caregiver ed/instruction.      Timed Code Treatment Minutes:   29  minutes    Total Treatment Time:   39  minutes    Signature and License Number    Radha Lu OTR/L  #[id number]        If patient discharges from this facility prior to next visit, this note will serve as the Discharge Summary

## 2019-12-09 NOTE — PLAN OF CARE
Problem: Depressive Behavior With or Without Suicide Precautions:  Goal: Able to verbalize and/or display a decrease in depressive symptoms  Description  Able to verbalize and/or display a decrease in depressive symptoms  12/9/2019 1046 by Jasson Corona LPN  Outcome: Ongoing    Problem: Depressive Behavior With or Without Suicide Precautions:  Goal: Ability to disclose and discuss suicidal ideas will improve  Description  Ability to disclose and discuss suicidal ideas will improve  Outcome: Ongoing  Clarisa Files has been visible and social on unit. Patient expresses he feels better today than yesterday and reports he did get some sleep last night. Will continue to monitor for safety.

## 2019-12-09 NOTE — GROUP NOTE
Group Therapy Note    Date: 12/9/2019    Group Start Time: 1600  Group End Time: 8645  Group Topic: Healthy Living/Wellness    [address], RN        Group Therapy Note    Attendees: 10/16       Notes:  Participated fully in conversation regarding time management.     Status After Intervention:  Improved    Participation Level: Interactive    Participation Quality: Appropriate, Attentive, Sharing and Supportive      Speech:  normal      Thought Process/Content: Logical  Linear      Affective Functioning: Congruent      Mood: euthymic      Level of consciousness:  Alert and Oriented x4      Response to Learning: Able to verbalize current knowledge/experience and Able to verbalize/acknowledge new learning      Endings: None Reported    Modes of Intervention: Education      Discipline Responsible: Registered Nurse      Signature:  Sandra Suggs RN

## 2019-12-10 NOTE — BH NOTE
[address] [facility]    Pt discharged with followings belongings:   Dentures: None  Vision - Corrective Lenses: None  Hearing Aid: None  Jewelry: None  Body Piercings Removed: N/A  Clothing: None  Were All Patient Medications Collected?: Not Applicable  Other Valuables: None   Valuables sent home withpatient. Valuables retrieved from safe, Security envelope  and returned to patient. Patient education on aftercare instructions: follow up and medication Patient verbalize understanding of AVS:  yes. Status EXAM upon discharge:  Status and Exam  Normal: No  Facial Expression: Flat  Affect: Congruent  Level of Consciousness: Alert  Mood:Normal: No  Mood: Depressed  Motor Activity:Normal: No  Motor Activity: Decreased  Interview Behavior: Cooperative  Preception: [place] to Person, [place] to Place, [place] to Time, [place] to Situation  Attention:Normal: No  Attention: Distractible  Thought Processes: Circumstantial  Thought Content:Normal: No  Thought Content: Preoccupations  Hallucinations: (currently deneis)  Delusions: No  Memory:Normal: No  Memory: Poor Recent  Insight and Judgment: No  Insight and Judgment: Poor Judgment(improved)  Present Suicidal Ideation: No  Present Homicidal Ideation: No      Metabolic Screening:    Lab Results   Component Value Date    LABA1C 5.7 12/07/2019       Lab Results   Component Value Date    CHOL 87 12/07/2019    CHOL 169 03/22/2016     Lab Results   Component Value Date    TRIG 70 12/07/2019    TRIG 68 03/22/2016     Lab Results   Component Value Date    HDL 40 12/07/2019    HDL 62 (H) 03/22/2016     No components found for: [facility] [place]  Lab Results   Component Value Date    LABVLDL 14 12/07/2019    LABVLDL 14 03/22/2016       Bridge Appointment completed: Reviewed Discharge Instructions with patient. Patient verbalizes understanding and agreement with the discharge plan using the teachback method.      Referral for Outpatient Tobacco Cessation Counseling, upon discharge (jacobo X if applicable and completed):    ( )  Hospital staff assisted patient to call Quit Line or faxed referral                                   during hospitalization                  ( )  Recognizing danger situations (included triggers and roadblocks), if not completed on admission                    ( )  Coping skills (new ways to manage stress, exercise, relaxation techniques, changing routine, distraction), if not completed on admission                                                           ( )  Basic information about quitting (benefits of quitting, techniques in how to quit, available resources, if not completed on admission  ( ) Referral for counseling faxed to [facility]   (x ) Patient refused referral  ( ) Patient refused counseling  ( ) Patient refused smoking cessation medication upon discharge    Vaccinations (jacobo X if applicable and completed):  ( ) Patient states already received influenza vaccine elsewhere  ( ) Patient received influenza vaccine during this hospitalization  (x ) Patient refused influenza vaccine at this time      Julio Cesar Gtz RN

## 2020-03-16 ENCOUNTER — HOSPITAL ENCOUNTER (EMERGENCY)
Age: 40
Discharge: LEFT BEFORE DISCHARGE | End: 2020-03-16
Payer: COMMERCIAL

## 2020-09-23 ENCOUNTER — TELEPHONE (OUTPATIENT)
Dept: ORTHOPEDIC SURGERY | Age: 40
End: 2020-09-23

## 2020-09-23 ENCOUNTER — HOSPITAL ENCOUNTER (INPATIENT)
Age: 40
LOS: 3 days | Discharge: LEFT BEFORE DISCHARGE | DRG: 347 | End: 2020-09-26
Attending: EMERGENCY MEDICINE | Admitting: INTERNAL MEDICINE
Payer: COMMERCIAL

## 2020-09-23 PROBLEM — Z72.0 TOBACCO ABUSE: Status: ACTIVE | Noted: 2020-09-23

## 2020-09-23 PROBLEM — M46.26 OSTEOMYELITIS OF LUMBAR SPINE (HCC): Status: ACTIVE | Noted: 2020-09-23

## 2020-09-23 LAB
A/G RATIO: 1 (ref 1.1–2.2)
ALBUMIN SERPL-MCNC: 3.3 G/DL (ref 3.4–5)
ALP BLD-CCNC: 78 U/L (ref 40–129)
ALT SERPL-CCNC: 33 U/L (ref 10–40)
ANION GAP SERPL CALCULATED.3IONS-SCNC: 7 MMOL/L (ref 3–16)
APTT: 29.9 SEC (ref 24.2–36.2)
AST SERPL-CCNC: 26 U/L (ref 15–37)
BASOPHILS ABSOLUTE: 0 K/UL (ref 0–0.2)
BASOPHILS RELATIVE PERCENT: 0.4 %
BILIRUB SERPL-MCNC: 0.5 MG/DL (ref 0–1)
BILIRUBIN URINE: NEGATIVE
BLOOD, URINE: NEGATIVE
BUN BLDV-MCNC: 20 MG/DL (ref 7–20)
C-REACTIVE PROTEIN: 21.3 MG/L (ref 0–5.1)
CALCIUM SERPL-MCNC: 8.8 MG/DL (ref 8.3–10.6)
CHLORIDE BLD-SCNC: 105 MMOL/L (ref 99–110)
CLARITY: CLEAR
CO2: 24 MMOL/L (ref 21–32)
COLOR: NORMAL
CREAT SERPL-MCNC: 1 MG/DL (ref 0.9–1.3)
EOSINOPHILS ABSOLUTE: 0.2 K/UL (ref 0–0.6)
EOSINOPHILS RELATIVE PERCENT: 2.5 %
GFR AFRICAN AMERICAN: >60
GFR NON-AFRICAN AMERICAN: >60
GLOBULIN: 3.4 G/DL
GLUCOSE BLD-MCNC: 84 MG/DL (ref 70–99)
GLUCOSE URINE: NEGATIVE MG/DL
HCT VFR BLD CALC: 33.2 % (ref 40.5–52.5)
HEMOGLOBIN: 11.1 G/DL (ref 13.5–17.5)
INR BLD: 1 (ref 0.86–1.14)
KETONES, URINE: NEGATIVE MG/DL
LEUKOCYTE ESTERASE, URINE: NEGATIVE
LYMPHOCYTES ABSOLUTE: 2.8 K/UL (ref 1–5.1)
LYMPHOCYTES RELATIVE PERCENT: 28.5 %
MCH RBC QN AUTO: 29.3 PG (ref 26–34)
MCHC RBC AUTO-ENTMCNC: 33.4 G/DL (ref 31–36)
MCV RBC AUTO: 87.7 FL (ref 80–100)
MICROSCOPIC EXAMINATION: NORMAL
MONOCYTES ABSOLUTE: 0.8 K/UL (ref 0–1.3)
MONOCYTES RELATIVE PERCENT: 8.4 %
NEUTROPHILS ABSOLUTE: 6 K/UL (ref 1.7–7.7)
NEUTROPHILS RELATIVE PERCENT: 60.2 %
NITRITE, URINE: NEGATIVE
PDW BLD-RTO: 14 % (ref 12.4–15.4)
PH UA: 6 (ref 5–8)
PLATELET # BLD: 337 K/UL (ref 135–450)
PMV BLD AUTO: 7.1 FL (ref 5–10.5)
POTASSIUM SERPL-SCNC: 4.2 MMOL/L (ref 3.5–5.1)
PROTEIN UA: NEGATIVE MG/DL
PROTHROMBIN TIME: 11.6 SEC (ref 10–13.2)
RBC # BLD: 3.79 M/UL (ref 4.2–5.9)
SEDIMENTATION RATE, ERYTHROCYTE: 34 MM/HR (ref 0–15)
SODIUM BLD-SCNC: 136 MMOL/L (ref 136–145)
SPECIFIC GRAVITY UA: 1.01 (ref 1–1.03)
TOTAL PROTEIN: 6.7 G/DL (ref 6.4–8.2)
URINE REFLEX TO CULTURE: NORMAL
URINE TYPE: NORMAL
UROBILINOGEN, URINE: 0.2 E.U./DL
WBC # BLD: 9.9 K/UL (ref 4–11)

## 2020-09-23 PROCEDURE — 2580000003 HC RX 258: Performed by: INTERNAL MEDICINE

## 2020-09-23 PROCEDURE — 80053 COMPREHEN METABOLIC PANEL: CPT

## 2020-09-23 PROCEDURE — 85652 RBC SED RATE AUTOMATED: CPT

## 2020-09-23 PROCEDURE — 81003 URINALYSIS AUTO W/O SCOPE: CPT

## 2020-09-23 PROCEDURE — 86140 C-REACTIVE PROTEIN: CPT

## 2020-09-23 PROCEDURE — 85025 COMPLETE CBC W/AUTO DIFF WBC: CPT

## 2020-09-23 PROCEDURE — 1200000000 HC SEMI PRIVATE

## 2020-09-23 PROCEDURE — 85610 PROTHROMBIN TIME: CPT

## 2020-09-23 PROCEDURE — 85730 THROMBOPLASTIN TIME PARTIAL: CPT

## 2020-09-23 PROCEDURE — 99284 EMERGENCY DEPT VISIT MOD MDM: CPT

## 2020-09-23 PROCEDURE — 96375 TX/PRO/DX INJ NEW DRUG ADDON: CPT

## 2020-09-23 PROCEDURE — 2580000003 HC RX 258: Performed by: PHYSICIAN ASSISTANT

## 2020-09-23 PROCEDURE — 96365 THER/PROPH/DIAG IV INF INIT: CPT

## 2020-09-23 PROCEDURE — 87040 BLOOD CULTURE FOR BACTERIA: CPT

## 2020-09-23 PROCEDURE — 6360000002 HC RX W HCPCS: Performed by: PHYSICIAN ASSISTANT

## 2020-09-23 PROCEDURE — 6370000000 HC RX 637 (ALT 250 FOR IP): Performed by: INTERNAL MEDICINE

## 2020-09-23 PROCEDURE — 6360000002 HC RX W HCPCS: Performed by: INTERNAL MEDICINE

## 2020-09-23 RX ORDER — BUPROPION HYDROCHLORIDE 100 MG/1
100 TABLET, EXTENDED RELEASE ORAL 2 TIMES DAILY
Status: DISCONTINUED | OUTPATIENT
Start: 2020-09-23 | End: 2020-09-26 | Stop reason: HOSPADM

## 2020-09-23 RX ORDER — PANTOPRAZOLE SODIUM 40 MG/1
40 TABLET, DELAYED RELEASE ORAL DAILY
COMMUNITY
End: 2021-11-28

## 2020-09-23 RX ORDER — OLANZAPINE 5 MG/1
10 TABLET ORAL NIGHTLY
Status: DISCONTINUED | OUTPATIENT
Start: 2020-09-23 | End: 2020-09-26 | Stop reason: HOSPADM

## 2020-09-23 RX ORDER — NICOTINE 21 MG/24HR
1 PATCH, TRANSDERMAL 24 HOURS TRANSDERMAL DAILY
Status: DISCONTINUED | OUTPATIENT
Start: 2020-09-23 | End: 2020-09-26 | Stop reason: HOSPADM

## 2020-09-23 RX ORDER — PROMETHAZINE HYDROCHLORIDE 25 MG/1
12.5 TABLET ORAL EVERY 6 HOURS PRN
Status: DISCONTINUED | OUTPATIENT
Start: 2020-09-23 | End: 2020-09-26 | Stop reason: HOSPADM

## 2020-09-23 RX ORDER — ACETAMINOPHEN 325 MG/1
650 TABLET ORAL EVERY 6 HOURS PRN
Status: DISCONTINUED | OUTPATIENT
Start: 2020-09-23 | End: 2020-09-26 | Stop reason: HOSPADM

## 2020-09-23 RX ORDER — BUPROPION HYDROCHLORIDE 100 MG/1
100 TABLET, EXTENDED RELEASE ORAL 2 TIMES DAILY
COMMUNITY
Start: 2020-08-21

## 2020-09-23 RX ORDER — POLYETHYLENE GLYCOL 3350 17 G/17G
17 POWDER, FOR SOLUTION ORAL DAILY PRN
Status: DISCONTINUED | OUTPATIENT
Start: 2020-09-23 | End: 2020-09-26 | Stop reason: HOSPADM

## 2020-09-23 RX ORDER — MORPHINE SULFATE 2 MG/ML
2 INJECTION, SOLUTION INTRAMUSCULAR; INTRAVENOUS EVERY 4 HOURS PRN
Status: DISCONTINUED | OUTPATIENT
Start: 2020-09-23 | End: 2020-09-26 | Stop reason: HOSPADM

## 2020-09-23 RX ORDER — OXYCODONE HYDROCHLORIDE 5 MG/1
5 TABLET ORAL EVERY 4 HOURS PRN
Status: DISCONTINUED | OUTPATIENT
Start: 2020-09-23 | End: 2020-09-26 | Stop reason: HOSPADM

## 2020-09-23 RX ORDER — ONDANSETRON 2 MG/ML
4 INJECTION INTRAMUSCULAR; INTRAVENOUS ONCE
Status: COMPLETED | OUTPATIENT
Start: 2020-09-23 | End: 2020-09-23

## 2020-09-23 RX ORDER — FENTANYL CITRATE 50 UG/ML
50 INJECTION, SOLUTION INTRAMUSCULAR; INTRAVENOUS EVERY 30 MIN PRN
Status: DISCONTINUED | OUTPATIENT
Start: 2020-09-23 | End: 2020-09-26 | Stop reason: HOSPADM

## 2020-09-23 RX ORDER — CYCLOBENZAPRINE HCL 10 MG
10 TABLET ORAL 3 TIMES DAILY PRN
Status: DISCONTINUED | OUTPATIENT
Start: 2020-09-23 | End: 2020-09-26 | Stop reason: HOSPADM

## 2020-09-23 RX ORDER — ACETAMINOPHEN 650 MG/1
650 SUPPOSITORY RECTAL EVERY 6 HOURS PRN
Status: DISCONTINUED | OUTPATIENT
Start: 2020-09-23 | End: 2020-09-26 | Stop reason: HOSPADM

## 2020-09-23 RX ORDER — ONDANSETRON 2 MG/ML
4 INJECTION INTRAMUSCULAR; INTRAVENOUS EVERY 6 HOURS PRN
Status: DISCONTINUED | OUTPATIENT
Start: 2020-09-23 | End: 2020-09-26 | Stop reason: HOSPADM

## 2020-09-23 RX ORDER — IBUPROFEN 800 MG/1
800 TABLET ORAL EVERY 6 HOURS PRN
COMMUNITY
End: 2021-01-06

## 2020-09-23 RX ORDER — SODIUM CHLORIDE 0.9 % (FLUSH) 0.9 %
10 SYRINGE (ML) INJECTION EVERY 12 HOURS SCHEDULED
Status: DISCONTINUED | OUTPATIENT
Start: 2020-09-23 | End: 2020-09-26 | Stop reason: HOSPADM

## 2020-09-23 RX ORDER — SODIUM CHLORIDE 0.9 % (FLUSH) 0.9 %
10 SYRINGE (ML) INJECTION PRN
Status: DISCONTINUED | OUTPATIENT
Start: 2020-09-23 | End: 2020-09-26 | Stop reason: HOSPADM

## 2020-09-23 RX ADMIN — VANCOMYCIN HYDROCHLORIDE 1000 MG: 1 INJECTION, POWDER, LYOPHILIZED, FOR SOLUTION INTRAVENOUS at 23:48

## 2020-09-23 RX ADMIN — CEFEPIME HYDROCHLORIDE 2 G: 2 INJECTION, POWDER, FOR SOLUTION INTRAVENOUS at 13:44

## 2020-09-23 RX ADMIN — MORPHINE SULFATE 2 MG: 2 INJECTION, SOLUTION INTRAMUSCULAR; INTRAVENOUS at 16:49

## 2020-09-23 RX ADMIN — OLANZAPINE 10 MG: 5 TABLET, FILM COATED ORAL at 20:22

## 2020-09-23 RX ADMIN — VANCOMYCIN HYDROCHLORIDE 1.5 G: 10 INJECTION, POWDER, LYOPHILIZED, FOR SOLUTION INTRAVENOUS at 15:00

## 2020-09-23 RX ADMIN — OXYCODONE HYDROCHLORIDE 5 MG: 5 TABLET ORAL at 20:22

## 2020-09-23 RX ADMIN — FENTANYL CITRATE 50 MCG: 50 INJECTION, SOLUTION INTRAMUSCULAR; INTRAVENOUS at 13:45

## 2020-09-23 RX ADMIN — SODIUM CHLORIDE, PRESERVATIVE FREE 10 ML: 5 INJECTION INTRAVENOUS at 20:23

## 2020-09-23 RX ADMIN — ONDANSETRON 4 MG: 2 INJECTION INTRAMUSCULAR; INTRAVENOUS at 13:45

## 2020-09-23 ASSESSMENT — PAIN DESCRIPTION - LOCATION
LOCATION: BACK
LOCATION: BACK

## 2020-09-23 ASSESSMENT — PAIN SCALES - GENERAL
PAINLEVEL_OUTOF10: 10
PAINLEVEL_OUTOF10: 10
PAINLEVEL_OUTOF10: 9
PAINLEVEL_OUTOF10: 10

## 2020-09-23 ASSESSMENT — ENCOUNTER SYMPTOMS
SORE THROAT: 0
ABDOMINAL PAIN: 0
SHORTNESS OF BREATH: 0
BACK PAIN: 1

## 2020-09-23 ASSESSMENT — PAIN DESCRIPTION - ORIENTATION
ORIENTATION: LOWER
ORIENTATION: LOWER

## 2020-09-23 ASSESSMENT — PAIN DESCRIPTION - PAIN TYPE: TYPE: ACUTE PAIN

## 2020-09-23 NOTE — CONSULTS
Pharmacy to dose Vanco:  Patient received 1.5g at 1500 in ED to provide Gram+ organism coverage to include MRSA. Will continue at 1g IV Q8hrs and check a trough 9/25 at 0630.   Carol Aparicio PharmD 9/23/2020 4:48 PM

## 2020-09-23 NOTE — ED PROVIDER NOTES
I independently performed a history and physical on [facility]. All diagnostic, treatment, and disposition decisions were made by myself in conjunction with the advanced practice provider. For further details of [facility] emergency department encounter, please see AMINA Louis's documentation. Patient presents to the emergency department complaining of low back pain. States he has had these symptoms for over a month. Patient reports some numbness/tingling intermittently involving his buttocks. He was seen and evaluated by his primary care yesterday and had an outpatient MRI completed concerning for discitis/osteomyelitis. Patient was sent to the emergency department for further evaluation and treatment. Physical exam: General: No acute distress. Moderate discomfort. Heart: Regular rhythm. Normal S1-S2. Lungs: Clear to auscultation bilaterally. Abdomen: Soft. Nontender nondistended. Musculoskeletal: Diffuse tenderness of the lower lumbar spine. Neurologic: Cranial nerves II through XII intact. Muscle strength 5/5. Sensation within normal limits to bilateral lower extremities. No saddle anesthesia noted. No urinary incontinence. Muscle strength 5/5 in bilateral lower extremities. Patient not able to be ambulated secondary to pain. Assessment/plan:    1.  Acute osteomyelitis of lumbar spine ([facility] [address]           Melody Levin, DO  09/23/20 Jay[place] [address], DO  09/23/20 7450

## 2020-09-23 NOTE — ED PROVIDER NOTES
[address] [facility] ENCOUNTER        Pt Name: Benton Saez  MRN: [id number]  [place] 1980  Date of evaluation: 9/23/2020  Provider: AMINA Sena  PCP: Garth Libman, APRN - CNP     I have seen and evaluated this patient with my supervising physician Dr. Helen Sagastume. CHIEF COMPLAINT       Chief Complaint   Patient presents with    Back Pain     for 3 weeks. about 10 days ago he was unable to walk. He went to have an MRI and he was told to come to ED due to a spinal infection \"or some crap like that\"       HISTORY OF PRESENT ILLNESS   (Location, Timing/Onset, Context/Setting, Quality, Duration, Modifying Factors, Severity, Associated Signs and Symptoms)  Note limiting factors. Benton Saez is a 37 y.o. male with history of hepatitis C, IV drug abuse who presents emergency department for abnormal MRI, back pain. Patient reports that approximately 3 weeks ago he began to experience a mid lumbar back pain. He has had back pain in the past and tried to ignore it, however 3 days later the pain became so severe that it caused him to fall. Followed with his primary care provider last week, initially ordered an x-ray and then an MRI with and without contrast yesterday. The MRI resulted with signs concerning for discitis/osteomyelitis. Patient was called instructed to follow-up in the emergency room. He notes that he is pain when he tries to urinate, and that sometimes he is not able to walk to the bathroom when he feels the urge to urinate but denies true incontinence. Denies numbness, weakness, fever, abdominal pain, chest pain, shortness of breath. Nursing Notes were all reviewed and agreed with or any disagreements were addressed in the HPI. REVIEW OF SYSTEMS    (2-9 systems for level 4, 10 or more for level 5)     Review of Systems   Constitutional: Negative for fever. HENT: Negative for sore throat.     Respiratory: Negative for shortness of breath. Cardiovascular: Negative for chest pain. Gastrointestinal: Negative for abdominal pain. Genitourinary: Negative for dysuria and frequency. Musculoskeletal: Positive for back pain. Negative for neck pain. Neurological: Negative for weakness, numbness and headaches. Psychiatric/Behavioral: Negative for confusion. Positives and Pertinent negatives as per HPI. Except as noted above in the ROS, all other systems were reviewed and negative. PAST MEDICAL HISTORY     Past Medical History:   Diagnosis Date    Back pain     Depression 12/9/2019    GERD (gastroesophageal reflux disease)     Hepatitis C     Polysubstance abuse ([facility] [address]     Wears glasses          SURGICAL HISTORY     Past Surgical History:   Procedure Laterality Date    APPENDECTOMY      SHOULDER SURGERY Left 4/1/14    UPPER GASTROINTESTINAL ENDOSCOPY  2014         CURRENTMEDICATIONS       Current Discharge Medication List      CONTINUE these medications which have NOT CHANGED    Details   buPROPion (WELLBUTRIN SR) 100 MG extended release tablet Take 100 mg by mouth 2 times daily      ibuprofen (ADVIL;MOTRIN) 800 MG tablet Take 800 mg by mouth every 6 hours as needed for Pain      pantoprazole (PROTONIX) 40 MG tablet Take 40 mg by mouth daily      OLANZapine (ZYPREXA) 10 MG tablet Take 1 tablet by mouth nightly  Qty: 30 tablet, Refills: 0               ALLERGIES     Patient has no known allergies.     FAMILYHISTORY       Family History   Problem Relation Age of Onset    Cancer Mother     Heart Disease Father           SOCIAL HISTORY       Social History     Tobacco Use    Smoking status: Current Every Day Smoker     Packs/day: 1.00     Types: Cigarettes    Smokeless tobacco: Never Used   Substance Use Topics    Alcohol use: No     Alcohol/week: 0.0 standard drinks    Drug use: Yes     Types: Marijuana     Comment: last used this weekend       SCREENINGS             PHYSICAL EXAM    (up to 7 for level 4, 8 or more for level 5)     ED Triage Vitals [09/23/20 1046]   BP Temp Temp Source Pulse Resp SpO2 Height Weight   -- 98.3 °F (36.8 °C) Oral 85 18 99 % 5' 9\" (1.753 m) 150 lb (68 kg)       Physical Exam  Vitals signs reviewed. Constitutional:       Appearance: He is not diaphoretic. HENT:      Nose: No congestion or rhinorrhea. Eyes:      General: No scleral icterus. Conjunctiva/sclera: Conjunctivae normal.   Neck:      Musculoskeletal: Normal range of motion and neck supple. Cardiovascular:      Rate and Rhythm: Normal rate and regular rhythm. Pulses: Normal pulses. Heart sounds: Normal heart sounds. No murmur. No friction rub. No gallop. Pulmonary:      Effort: Pulmonary effort is normal. No respiratory distress. Breath sounds: Normal breath sounds. No stridor. No wheezing, rhonchi or rales. Abdominal:      General: There is no distension. Palpations: Abdomen is soft. Tenderness: There is no abdominal tenderness. There is no right CVA tenderness, left CVA tenderness, guarding or rebound. Hernia: No hernia is present. Genitourinary:     Penis: Normal.       Scrotum/Testes: Normal.      Rectum: Normal.      Comments: No saddle anesthesia, normal rectal tone. Musculoskeletal: Normal range of motion. Skin:     General: Skin is warm and dry. Capillary Refill: Capillary refill takes less than 2 seconds. Neurological:      General: No focal deficit present. Mental Status: He is alert and oriented to person, place, and time. Sensory: No sensory deficit. Motor: No weakness. Comments: No appreciable weakness or sensory deficit on my exam, unable to test gait due to patient's pain.    Psychiatric:         Mood and Affect: Mood normal.         Behavior: Behavior normal.         DIAGNOSTIC RESULTS   LABS:    Labs Reviewed   CBC WITH AUTO DIFFERENTIAL - Abnormal; Notable for the following components:       Result Value    RBC 3.79 (*)     Hemoglobin 11.1 (*)     Hematocrit 33.2 (*)     All other components within normal limits    Narrative:     Performed at:  [address], [facility] [employer]   Phone [phone]   COMPREHENSIVE METABOLIC PANEL - Abnormal; Notable for the following components:    Alb 3.3 (*)     Albumin/Globulin Ratio 1.0 (*)     All other components within normal limits    Narrative:     Performed at:  [place] [address] [employer]   Phone [phone]   SEDIMENTATION RATE - Abnormal; Notable for the following components:    Sed Rate 34 (*)     All other components within normal limits    Narrative:     Performed at:  [place] [address] [employer]   Phone [phone]   C-REACTIVE PROTEIN - Abnormal; Notable for the following components:    CRP 21.3 (*)     All other components within normal limits    Narrative:     Performed at:  [address] [address]   Phone [phone]   CULTURE, BLOOD 1   CULTURE, BLOOD 2   PROTIME-INR    Narrative:     Performed at:  [place] [address] [employer]   Phone [phone]   APTT    Narrative:     Performed at:  [place] [address] [employer]   Phone [phone]   URINE RT REFLEX TO CULTURE    Narrative:     Performed at:  [place] [address] [employer]   Phone [phone]   BASIC METABOLIC PANEL W/ REFLEX TO MG FOR LOW K   CBC WITH AUTO DIFFERENTIAL       All other labs were within normal range or not returned as of this dictation. EKG: All EKG's are interpreted by the Emergency Department Physician in the absence of a cardiologist.  Please see their note for interpretation of EKG.       RADIOLOGY:   Non-plain film images such as CT, Ultrasound and MRI are read by the radiologist. Plain radiographic images are visualized and preliminarily interpreted by the ED Provider with the below findings:        Interpretation per the Radiologist below, if available at the time of this note:    No orders to display     MRI Lumbar Spine with and without contrast:  Subchondral signal changes and enhancement L5-S1 level with associated mild signal seen within the disc. Although the endplates are relatively well-maintained, sequela of early discitis/osteomyelitis remains a primary consideration. There is also some     epidural enhancement noted posterior to the vertebra and disc at this level which could be related to phlegmon. Correlation for signs and symptoms of infection advised. In addition assessment for leukocytosis, elevated CRP, or sedimentation rate could     also be helpful. Sequela of degenerative signal alteration felt a less likely consideration.        Diffuse degenerative changes as described. Findings are most pronounced at the L5-S1 level where there is potential compromise of the L5 and S1 nerve roots. Correlation for L5 and S1 radiculopathy recommended as described.        This report was marked for notification to the ordering clinician per [facility] protocol. PROCEDURES   Unless otherwise noted below,    US Guided IV:  Indication - Nurse request, unable to get IV  Sterile procedures observed. 18-gauge IV successfully placed in right forearm on 1 attempt.       Procedures    CRITICAL CARE TIME   N/A    CONSULTS:  IP CONSULT TO NEUROSURGERY  IP CONSULT TO HOSPITALIST  IP CONSULT TO INFECTIOUS DISEASES  IP CONSULT TO PHARMACY  IP CONSULT TO SPIRITUAL SERVICES  IP CONSULT TO ORTHOPEDIC SURGERY      EMERGENCY DEPARTMENT COURSE and DIFFERENTIAL DIAGNOSIS/MDM:   Vitals:    Vitals:    09/23/20 1336 09/23/20 1421 09/23/20 1648 09/23/20 2010   BP: 111/65 110/74 127/75 123/69   Pulse:   55 77   Resp:   17 18   Temp:   98 °F (36.7 °C) 98.7 °F (37.1 °C)   TempSrc:   Oral Oral   SpO2: 99% 100% 100% 97%   Weight:       Height:           Patient was given the following medications:  Medications   fentaNYL (SUBLIMAZE) injection 50 mcg (50 mcg Intravenous Given 9/23/20 1345)   buPROPion [facility]) extended release tablet 100 mg (100 mg Oral Not Given 9/23/20 2022)   OLANZapine (ZYPREXA) tablet 10 mg (10 mg Oral Given 9/23/20 2022)   sodium chloride flush 0.9 % injection 10 mL (10 mLs Intravenous Given 9/23/20 2023)   sodium chloride flush 0.9 % injection 10 mL (has no administration in time range)   acetaminophen (TYLENOL) tablet 650 mg (has no administration in time range)     Or   acetaminophen (TYLENOL) suppository 650 mg (has no administration in time range)   polyethylene glycol (GLYCOLAX) packet 17 g (has no administration in time range)   promethazine (PHENERGAN) tablet 12.5 mg (has no administration in time range)     Or   ondansetron (ZOFRAN) injection 4 mg (has no administration in time range)   enoxaparin (LOVENOX) injection 40 mg (has no administration in time range)   cefepime (MAXIPIME) 2 g IVPB minibag (has no administration in time range)   oxyCODONE (ROXICODONE) immediate release tablet 5 mg (5 mg Oral Given 9/23/20 2022)   morphine (PF) injection 2 mg (2 mg Intravenous Given 9/23/20 1649)   nicotine (NICODERM CQ) 14 MG/24HR 1 patch (1 patch Transdermal Patch Applied 9/23/20 1649)   vancomycin 1000 mg IVPB in 250 mL D5W addavial (has no administration in time range)   cyclobenzaprine (FLEXERIL) tablet 10 mg (has no administration in time range)   cefepime (MAXIPIME) 2 g IVPB minibag (0 g Intravenous Stopped 9/23/20 1500)   vancomycin 1.5 g in dextrose 5% 300 mL IVPB (0 g Intravenous Stopped 9/23/20 1734)   ondansetron (ZOFRAN) injection 4 mg (4 mg Intravenous Given 9/23/20 1345)           78-year-old male presents emergency room for back pain, abnormal MRI. MRI concerning for discitis/osteomyelitis.   History of IV drug abuse, placing her at high risk for this etiology. He is nonseptic, does not have saddle anesthesia, decreased rectal tone, or true incontinence; low concern for cauda equina syndrome. A call was placed to neurosurgery, who discussed the case with my supervising physician Dr. Leti Jacobo. Discussed case with hospitalist, who graciously agreed to admit the patient. FINAL IMPRESSION      1. Acute osteomyelitis of lumbar spine ([facility] [address]          DISPOSITION/PLAN   DISPOSITION Admitted 09/23/2020 02:08:48 PM      PATIENT REFERREDTO:  No follow-up provider specified.     DISCHARGE MEDICATIONS:  Current Discharge Medication List          DISCONTINUED MEDICATIONS:  Current Discharge Medication List                 (Please note that portions of this note were completed with a voice recognition program.  Efforts were made to edit the dictations but occasionally words are mis-transcribed.)    AMINA Longoria (electronically signed)         AMINA Longoria  09/23/20 2127       Disha Christianson, [address]  09/23/20 2129

## 2020-09-23 NOTE — H&P
Hospital Medicine History & Physical      PCP: JORDY Garcia - CNP    Date of Admission: 9/23/2020    Date of Service: Pt seen/examined on above date  and Admitted to Inpatient with expected LOS greater than two midnights due to medical therapy      Chief Complaint:  Low back pain     History Of Present Illness:      37 y.o. male with hx of back pain , depression, hx of IVDU who presented to [facility] with severe low back pain for weeks with radiation down his rt leg causing difficulty ambulating. Saw his PCP and had an MRI done yesterday which showed finding suggestive of early discitis /osteomyelitis of L5-S1 with possible phlegmon with potential compromise of L5-S1 nerve roots. He was sent to ER for further eval. Pt reports he has had occasional urinary incontinence in the past few days needing to change his clothes with no stool incontinence or saddle anesthesia. Denied any fever or chills. No recent trauma to his back. Hx of IVDU but quit illicit drugs about a year ago. Neurosurgery was consulted in ER. Past Medical History:          Diagnosis Date    Back pain     Depression 12/9/2019    GERD (gastroesophageal reflux disease)     Hepatitis C     Polysubstance abuse ([facility] [address]     Wears glasses        Past Surgical History:          Procedure Laterality Date    APPENDECTOMY      SHOULDER SURGERY Left 4/1/14    UPPER GASTROINTESTINAL ENDOSCOPY  2014       Medications Prior to Admission:      Prior to Admission medications    Medication Sig Start Date End Date Taking? Authorizing Provider   buPROPion [facility] SR) 100 MG extended release tablet Take 100 mg by mouth 2 times daily 8/21/20  Yes Historical Provider, MD   OLANZapine (ZYPREXA) 10 MG tablet Take 1 tablet by mouth nightly 12/9/19   Alphonzo Meigs, MD       Allergies:  Patient has no known allergies. Social History:      The patient currently lives at home.  Works as a      TOBACCO:   reports that he has been smoking cigarettes. He has been smoking about 1.00 pack per day. He has never used smokeless tobacco.  ETOH:   reports no history of alcohol use. E-Cigarettes Vaping or Juuling     Questions Responses    Vaping Use Never User    Start Date     Does device contain nicotine? Quit Date     Vaping Type             Family History:       Positive as follows:        Problem Relation Age of Onset    Cancer Mother     Heart Disease Father        REVIEW OF SYSTEMS:   Pertinent positives as noted in the HPI. All other systems reviewed and negative. PHYSICAL EXAM PERFORMED:    /74   Pulse 85   Temp 98.3 °F (36.8 °C) (Oral)   Resp 18   Ht 5' 9\" (1.753 m)   Wt 150 lb (68 kg)   SpO2 100%   BMI 22.15 kg/m²     General appearance: In mild distress due to pain, appears stated age and cooperative. HEENT:  Normal cephalic, atraumatic without obvious deformity. Pupils equal, round,  Conjunctivae/corneas clear. Neck: Supple, with full range of motion. No jugular venous distention. Trachea midline. Respiratory:  Normal respiratory effort. Clear to auscultation, bilaterally without Rales/Wheezes/Rhonchi. Cardiovascular:  Regular rate and rhythm with normal S1/S2 without murmurs, rubs or gallops. Abdomen: Soft, non-tender, non-distended with normal bowel sounds. Musculoskeletal:  No clubbing, cyanosis or edema bilaterally. Skin: scattered healed abrasions on bilateral extremities   Neurologic:  Limited exam of rt LE due to severe pain however neurovascularly intact without any focal sensory/motor deficits.  Cranial nerves: II-XII intact, grossly non-focal.  Psychiatric:  Alert and oriented, thought content appropriate, normal insight  Rectal exam: rectal tone intact     Labs:     Recent Labs     09/23/20  1337   WBC 9.9   HGB 11.1*   HCT 33.2*        Recent Labs     09/23/20  1337      K 4.2      CO2 24   BUN 20   CREATININE 1.0   CALCIUM 8.8     Recent Labs 09/23/20  1337   AST 26   ALT 33   BILITOT 0.5   ALKPHOS 78     Recent Labs     09/23/20  1337   INR 1.00     No results for input(s): CKTOTAL, TROPONINI in the last 72 hours. Urinalysis:      Lab Results   Component Value Date    NITRU Negative 12/05/2019    WBCUA 0-2 03/21/2018    RBCUA 0-2 03/21/2018    BLOODU Negative 12/05/2019    SPECGRAV 1.010 12/05/2019    GLUCOSEU Negative 12/05/2019       Radiology:       No orders to display        MRI lumbar spine with and without contrast on 9/22/2020    Subchondral signal changes and enhancement L5-S1 level with associated mild signal seen within the disc. Although the endplates are relatively well-maintained, sequela of early discitis/osteomyelitis remains a primary consideration. There is also some     epidural enhancement noted posterior to the vertebra and disc at this level which could be related to phlegmon. Correlation for signs and symptoms of infection advised. In addition assessment for leukocytosis, elevated CRP, or sedimentation rate could     also be helpful. Sequela of degenerative signal alteration felt a less likely consideration.        Diffuse degenerative changes as described. Findings are most pronounced at the L5-S1 level where there is potential compromise of the L5 and S1 nerve roots. Correlation for L5 and S1 radiculopathy recommended as described.        This report was marked for notification to the ordering clinician per [facility] protocol.         ASSESSMENT AND PLAN    Low back pain   Active Hospital Problems    Diagnosis Date Noted    Osteomyelitis of lumbar spine [facility]) [M46.26] 09/23/2020     Priority: High    Tobacco abuse [Z72.0] 09/23/2020    Depression [F32.9] 12/09/2019       Lumbosacral discitis / osteomyelitis with radiculopathy: involving L5-S1 with possible phlegmon. Hx of IVDU. Had  occasional urinary incontinence but none today. No gross focal neuro signs. Neurosurgery consulted in ER- awaiting official response.  Has

## 2020-09-23 NOTE — ED NOTES
Called NS consult @1201 (Pablo); 1249  Re: spinal osteomyelitis per Rita  @1334 no return call received @this time, called [facility] transfer center re: N/S consult  [address] transfer center called, asking if  had returned our call.  Stated not at this time  @65  returned call & spoke to [facility]  09/23/20 8843

## 2020-09-23 NOTE — ED NOTES
[email or web address]  Re: Douglas Chan.  osteomyelitis/discitis  per Rita Barnett returned call     Jackson Dukes Naegele  09/23/20 5615

## 2020-09-24 ENCOUNTER — TELEPHONE (OUTPATIENT)
Dept: ORTHOPEDIC SURGERY | Age: 40
End: 2020-09-24

## 2020-09-24 LAB
ANION GAP SERPL CALCULATED.3IONS-SCNC: 10 MMOL/L (ref 3–16)
BASOPHILS ABSOLUTE: 0.1 K/UL (ref 0–0.2)
BASOPHILS RELATIVE PERCENT: 0.5 %
BUN BLDV-MCNC: 14 MG/DL (ref 7–20)
CALCIUM SERPL-MCNC: 9.4 MG/DL (ref 8.3–10.6)
CHLORIDE BLD-SCNC: 105 MMOL/L (ref 99–110)
CO2: 24 MMOL/L (ref 21–32)
CREAT SERPL-MCNC: 0.9 MG/DL (ref 0.9–1.3)
EOSINOPHILS ABSOLUTE: 0.1 K/UL (ref 0–0.6)
EOSINOPHILS RELATIVE PERCENT: 1 %
GFR AFRICAN AMERICAN: >60
GFR NON-AFRICAN AMERICAN: >60
GLUCOSE BLD-MCNC: 99 MG/DL (ref 70–99)
HCT VFR BLD CALC: 39 % (ref 40.5–52.5)
HEMOGLOBIN: 12.8 G/DL (ref 13.5–17.5)
LYMPHOCYTES ABSOLUTE: 2.6 K/UL (ref 1–5.1)
LYMPHOCYTES RELATIVE PERCENT: 26.7 %
MCH RBC QN AUTO: 28.5 PG (ref 26–34)
MCHC RBC AUTO-ENTMCNC: 32.9 G/DL (ref 31–36)
MCV RBC AUTO: 86.6 FL (ref 80–100)
MONOCYTES ABSOLUTE: 0.8 K/UL (ref 0–1.3)
MONOCYTES RELATIVE PERCENT: 8.6 %
NEUTROPHILS ABSOLUTE: 6.2 K/UL (ref 1.7–7.7)
NEUTROPHILS RELATIVE PERCENT: 63.2 %
PDW BLD-RTO: 13.8 % (ref 12.4–15.4)
PLATELET # BLD: 405 K/UL (ref 135–450)
PMV BLD AUTO: 7.5 FL (ref 5–10.5)
POTASSIUM REFLEX MAGNESIUM: 3.9 MMOL/L (ref 3.5–5.1)
RBC # BLD: 4.5 M/UL (ref 4.2–5.9)
SODIUM BLD-SCNC: 139 MMOL/L (ref 136–145)
WBC # BLD: 9.8 K/UL (ref 4–11)

## 2020-09-24 PROCEDURE — 6360000002 HC RX W HCPCS: Performed by: INTERNAL MEDICINE

## 2020-09-24 PROCEDURE — 2580000003 HC RX 258: Performed by: INTERNAL MEDICINE

## 2020-09-24 PROCEDURE — 1200000000 HC SEMI PRIVATE

## 2020-09-24 PROCEDURE — 6370000000 HC RX 637 (ALT 250 FOR IP): Performed by: INTERNAL MEDICINE

## 2020-09-24 PROCEDURE — 99254 IP/OBS CNSLTJ NEW/EST MOD 60: CPT | Performed by: INTERNAL MEDICINE

## 2020-09-24 PROCEDURE — 86701 HIV-1ANTIBODY: CPT

## 2020-09-24 PROCEDURE — 85025 COMPLETE CBC W/AUTO DIFF WBC: CPT

## 2020-09-24 PROCEDURE — 80048 BASIC METABOLIC PNL TOTAL CA: CPT

## 2020-09-24 PROCEDURE — 87390 HIV-1 AG IA: CPT

## 2020-09-24 PROCEDURE — 86702 HIV-2 ANTIBODY: CPT

## 2020-09-24 PROCEDURE — 36415 COLL VENOUS BLD VENIPUNCTURE: CPT

## 2020-09-24 RX ORDER — ZOLPIDEM TARTRATE 5 MG/1
5 TABLET ORAL NIGHTLY PRN
Status: DISCONTINUED | OUTPATIENT
Start: 2020-09-24 | End: 2020-09-26 | Stop reason: HOSPADM

## 2020-09-24 RX ADMIN — CYCLOBENZAPRINE 10 MG: 10 TABLET, FILM COATED ORAL at 07:54

## 2020-09-24 RX ADMIN — OXYCODONE HYDROCHLORIDE 5 MG: 5 TABLET ORAL at 14:04

## 2020-09-24 RX ADMIN — CEFEPIME HYDROCHLORIDE 2 G: 2 INJECTION, POWDER, FOR SOLUTION INTRAVENOUS at 14:04

## 2020-09-24 RX ADMIN — ACETAMINOPHEN 650 MG: 325 TABLET ORAL at 17:59

## 2020-09-24 RX ADMIN — CYCLOBENZAPRINE 10 MG: 10 TABLET, FILM COATED ORAL at 14:04

## 2020-09-24 RX ADMIN — OXYCODONE HYDROCHLORIDE 5 MG: 5 TABLET ORAL at 09:53

## 2020-09-24 RX ADMIN — BUPROPION HYDROCHLORIDE 100 MG: 100 TABLET, EXTENDED RELEASE ORAL at 07:54

## 2020-09-24 RX ADMIN — OXYCODONE HYDROCHLORIDE 5 MG: 5 TABLET ORAL at 17:59

## 2020-09-24 RX ADMIN — CEFEPIME HYDROCHLORIDE 2 G: 2 INJECTION, POWDER, FOR SOLUTION INTRAVENOUS at 02:38

## 2020-09-24 RX ADMIN — ENOXAPARIN SODIUM 40 MG: 40 INJECTION SUBCUTANEOUS at 07:55

## 2020-09-24 RX ADMIN — OXYCODONE HYDROCHLORIDE 5 MG: 5 TABLET ORAL at 02:42

## 2020-09-24 RX ADMIN — OLANZAPINE 10 MG: 5 TABLET, FILM COATED ORAL at 22:43

## 2020-09-24 RX ADMIN — MORPHINE SULFATE 2 MG: 2 INJECTION, SOLUTION INTRAMUSCULAR; INTRAVENOUS at 07:55

## 2020-09-24 RX ADMIN — SODIUM CHLORIDE, PRESERVATIVE FREE 10 ML: 5 INJECTION INTRAVENOUS at 22:44

## 2020-09-24 RX ADMIN — VANCOMYCIN HYDROCHLORIDE 1000 MG: 1 INJECTION, POWDER, LYOPHILIZED, FOR SOLUTION INTRAVENOUS at 07:54

## 2020-09-24 RX ADMIN — BUPROPION HYDROCHLORIDE 100 MG: 100 TABLET, EXTENDED RELEASE ORAL at 22:43

## 2020-09-24 ASSESSMENT — PAIN SCALES - GENERAL
PAINLEVEL_OUTOF10: 8
PAINLEVEL_OUTOF10: 9
PAINLEVEL_OUTOF10: 9
PAINLEVEL_OUTOF10: 10
PAINLEVEL_OUTOF10: 8

## 2020-09-24 NOTE — CONSULTS
Neurosurgery Consult Note    IP CONSULT TO NEUROSURGERY  IP CONSULT TO HOSPITALIST  IP CONSULT TO INFECTIOUS DISEASES  IP CONSULT TO PHARMACY  IP CONSULT TO SPIRITUAL SERVICES  IP CONSULT TO ORTHOPEDIC SURGERY    Subjective:  CHIEF COMPLAINT / HPI:  Sushila Blum is a 37 y.o. male admitted for   Chief Complaint   Patient presents with    Back Pain     for 3 weeks. about 10 days ago he was unable to walk. He went to have an MRI and he was told to come to ED due to a spinal infection \"or some crap like that\"     36 y/o man with one month history of severe back pain worse with standing or walking. Mild intermittent right leg pain with numbness. Currently mostly just back pain, no bowel/bladder problems. History of IVDU but reportedly none recently. Outpatient MRI reported as L5/S1 discitis and he was sent to the ER. Films not available for review.       ALLERGIES:  No Known Allergies     CURRENT MEDS:  Current Facility-Administered Medications: fentaNYL (SUBLIMAZE) injection 50 mcg, 50 mcg, Intravenous, Q30 Min PRN  buPROPion (WELLBUTRIN SR) extended release tablet 100 mg, 100 mg, Oral, BID  OLANZapine (ZYPREXA) tablet 10 mg, 10 mg, Oral, Nightly  sodium chloride flush 0.9 % injection 10 mL, 10 mL, Intravenous, 2 times per day  sodium chloride flush 0.9 % injection 10 mL, 10 mL, Intravenous, PRN  acetaminophen (TYLENOL) tablet 650 mg, 650 mg, Oral, Q6H PRN **OR** acetaminophen (TYLENOL) suppository 650 mg, 650 mg, Rectal, Q6H PRN  polyethylene glycol (GLYCOLAX) packet 17 g, 17 g, Oral, Daily PRN  promethazine (PHENERGAN) tablet 12.5 mg, 12.5 mg, Oral, Q6H PRN **OR** ondansetron (ZOFRAN) injection 4 mg, 4 mg, Intravenous, Q6H PRN  enoxaparin (LOVENOX) injection 40 mg, 40 mg, Subcutaneous, Daily  cefepime (MAXIPIME) 2 g IVPB minibag, 2 g, Intravenous, Q12H  oxyCODONE (ROXICODONE) immediate release tablet 5 mg, 5 mg, Oral, Q4H PRN  morphine (PF) injection 2 mg, 2 mg, Intravenous, Q4H PRN  nicotine (NICODERM CQ) 14 MG/24HR 1 patch, 1 patch, Transdermal, Daily  vancomycin 1000 mg IVPB in 250 mL D5W addavial, 1,000 mg, Intravenous, Q8H  cyclobenzaprine (FLEXERIL) tablet 10 mg, 10 mg, Oral, TID PRN    PAST MEDICAL HISTORY:  Past Medical History:   Diagnosis Date    Back pain     Depression 12/9/2019    GERD (gastroesophageal reflux disease)     Hepatitis C     Polysubstance abuse ([address]     Wears glasses         PAST SURGICAL HISTORY:   has a past surgical history that includes Appendectomy; shoulder surgery (Left, 4/1/14); and Upper gastrointestinal endoscopy (2014). SOCIAL HISTORY:   reports that he has been smoking cigarettes. He has been smoking about 1.00 pack per day. He has never used smokeless tobacco. He reports current drug use. Drug: Marijuana. He reports that he does not drink alcohol. FAMILY HISTORY:  Family History   Problem Relation Age of Onset    Cancer Mother     Heart Disease Father        REVIEW OF SYSTEMS:  See HPI.      PHYSICAL EXAMINATION:  VITALS:  /76   Pulse 72   Temp 98.4 °F (36.9 °C) (Oral)   Resp 16   Ht 5' 9\" (1.753 m)   Wt 150 lb (68 kg)   SpO2 97%   BMI 22.15 kg/m²   Gen: lean adult male, NAD  Neuro: AAO speech clear and fluent   BLE str 5/5 sensation intact   Mild right SLR  Tender to palpation of lumbosacral junction, no deformity noted    LABORATORY DATA:   CBC:   Lab Results   Component Value Date    WBC 9.9 09/23/2020    HGB 11.1 09/23/2020    HCT 33.2 09/23/2020    MCV 87.7 09/23/2020     09/23/2020     BMP:   Lab Results   Component Value Date     09/23/2020    K 4.2 09/23/2020    K 3.7 12/06/2019     09/23/2020    CO2 24 09/23/2020    BUN 20 09/23/2020    CREATININE 1.0 09/23/2020    CALCIUM 8.8 09/23/2020     PT/INR:   Lab Results   Component Value Date    PROTIME 11.6 09/23/2020    INR 1.00 09/23/2020     APTT:   Lab Results   Component Value Date    APTT 29.9 09/23/2020       IMAGING STUDIES:   Outside MRI report of L5/S1 discitis with

## 2020-09-24 NOTE — FLOWSHEET NOTE
AD consult. Patient stated that mother would be his decision maker, patient not up to completing forms at this time. Patient's mother is listed in chart.  to f/u with conversation as needed, assistance to complete AD forms if patient wants, call [facility] [phone]    Haily Yuriy       09/24/20 0953   Encounter Summary   Services provided to: Patient   Referral/Consult From: Nurse   Support System Parent   Place of [facility])   Continue Visiting   (9/24: AD consult, \"in pain\" patient said, prayer)   Complexity of Encounter Moderate   Length of Encounter 15 minutes   Spiritual Assessment Completed Yes   Routine   Type Initial   Assessment   (\"Pain,\" sleepy)   Intervention Prayer; Active listening  (Left Ad forms, patient stated mother to be decision maker)   Outcome Expressed gratitude

## 2020-09-24 NOTE — PROGRESS NOTES
Comprehensive Nutrition Assessment    Type and Reason for Visit:  Initial, Positive Nutrition Screen(weight loss, decreased appetite)    Nutrition Recommendations/Plan:   1. Continue General diet  2. Ensure with meals, strawberry  3. Will monitor nutritional adequacy, nutrition-related labs, weights, BMs, and clinical progress     Nutrition Assessment:  Patient admitted for osteomyelitis of lumbar spine. Patient reported severe back pain, unable to walk for several days and appetite has been poor. Weight dropped 20 lbs in recent weeks per patient. History of IVDU in the past year, not recently. Patient ate all of lunch this afternoon and agreeable to Ensure strawberry with meals to enhance nutrition. Malnutrition Assessment:  Malnutrition Status: At risk for malnutrition (Comment)      Estimated Daily Nutrient Needs:  Energy (kcal):  8885-1500; Weight Used for Energy Requirements:  Ideal     Protein (g):  ; Weight Used for Protein Requirements:  Ideal(1.3-1.5)        Fluid (ml/day):   ; Weight Used for Fluid Requirements:         Nutrition Related Findings:  chewing and swallowing ok, ate all of lunch, patient stated losing 20 lbs in recent months; poor appetite from pain      Wounds:  (scattered abrasions)       Current Nutrition Therapies:    DIET GENERAL; Anthropometric Measures:  · Height: 5' 9\" (175.3 cm)  · Current Body Weight: 150 lb (68 kg)    · Ideal Body Weight: 160 lbs; % Ideal Body Weight     · BMI: 22.1  · BMI Categories: Normal Weight (BMI 18.5-24. 9)       Nutrition Diagnosis:   · Inadequate oral intake related to pain as evidenced by weight loss, poor intake prior to admission(patient reported losing 20 lbs recently)      Nutrition Interventions:   Food and/or Nutrient Delivery:  Continue Current Diet, Start Oral Nutrition Supplement  Nutrition Education/Counseling:  No recommendation at this time   Coordination of Nutrition Care:  Continued Inpatient Monitoring    Goals:  Patient will eat 50% or greater of meals and supplements.        Nutrition Monitoring and Evaluation:   Behavioral-Environmental Outcomes:  Beliefs and Attitutes   Food/Nutrient Intake Outcomes:  Food and Nutrient Intake, Supplement Intake  Physical Signs/Symptoms Outcomes:        Discharge Planning:    Continue Oral Nutrition Supplement, Continue current diet     Electronically signed by Julio Cesar Griffiths RD, LD on 9/24/20 at 3:20 PM EDT    Contact: Office: [phone]; [address]: [phone]

## 2020-09-24 NOTE — PROGRESS NOTES
Hospitalist Progress Note      PCP: Tuan Pearce, APRN - CNP    Date of Admission: 9/23/2020    Chief Complaint: low back pain       Hospital Course: H & P reviewed. Admitted with lumbosacral discitis/ osteomyelitis      Subjective:     Pt reports low back pain improved with pain meds. Denied any saddle anesthesia, urinary or fecal incontinence      Medications:  Reviewed    Infusion Medications   Scheduled Medications    buPROPion  100 mg Oral BID    OLANZapine  10 mg Oral Nightly    sodium chloride flush  10 mL Intravenous 2 times per day    enoxaparin  40 mg Subcutaneous Daily    cefepime  2 g Intravenous Q12H    nicotine  1 patch Transdermal Daily    vancomycin  1,000 mg Intravenous Q8H     PRN Meds: fentanNYL, sodium chloride flush, acetaminophen **OR** acetaminophen, polyethylene glycol, promethazine **OR** ondansetron, oxyCODONE, morphine, cyclobenzaprine      Intake/Output Summary (Last 24 hours) at 9/24/2020 1203  Last data filed at 9/24/2020 1111  Gross per 24 hour   Intake --   Output 3075 ml   Net -3075 ml       Physical Exam Performed:    /76   Pulse 72   Temp 98.4 °F (36.9 °C) (Oral)   Resp 16   Ht 5' 9\" (1.753 m)   Wt 150 lb (68 kg)   SpO2 97%   BMI 22.15 kg/m²     General appearance: In mild distress due to pain, appears stated age and cooperative. HEENT:  Normal cephalic, atraumatic without obvious deformity. Pupils equal, round,  Conjunctivae/corneas clear. Neck: Supple, with full range of motion. No jugular venous distention. Trachea midline. Respiratory:  Normal respiratory effort. Clear to auscultation, bilaterally without Rales/Wheezes/Rhonchi. Cardiovascular:  Regular rate and rhythm with normal S1/S2 without murmurs, rubs or gallops. Abdomen: Soft, non-tender, non-distended with normal bowel sounds. Musculoskeletal:  No clubbing, cyanosis or edema bilaterally.   Skin: scattered healed abrasions on bilateral extremities   Neurologic:  Limited exam of rt LE due to severe pain however neurovascularly intact without any focal sensory/motor deficits. Cranial nerves: II-XII intact, grossly non-focal.  Psychiatric:  Alert and oriented, thought content appropriate, normal insight      Labs:   Recent Labs     09/23/20  1337   WBC 9.9   HGB 11.1*   HCT 33.2*        Recent Labs     09/23/20  1337      K 4.2      CO2 24   BUN 20   CREATININE 1.0   CALCIUM 8.8     Recent Labs     09/23/20  1337   AST 26   ALT 33   BILITOT 0.5   ALKPHOS 78     Recent Labs     09/23/20  1337   INR 1.00     No results for input(s): Norvel Yung in the last 72 hours. Urinalysis:      Lab Results   Component Value Date    NITRU Negative 09/23/2020    WBCUA 0-2 03/21/2018    RBCUA 0-2 03/21/2018    BLOODU Negative 09/23/2020    SPECGRAV 1.010 09/23/2020    GLUCOSEU Negative 09/23/2020       Radiology:  No orders to display           Assessment/Plan:    Active Hospital Problems    Diagnosis    Osteomyelitis of lumbar spine ([facility] [address] [M46.26]     Priority: High    Tobacco abuse [Z72.0]    Depression [F32.9]     Lumbosacral discitis / osteomyelitis with radiculopathy: involving L5-S1 with possible phlegmon. No overt signs of cord compression currently. Neurosurgery, ortho and ID consulted. Official recs pending. ESR and CRP were elevated. Continue abx while awaiting cultures  Continue  Pain control, flexeril PRN      Hx of IVDU: pt reports he has not used any illicit drugs since May of last year.         Tobacco abuse: cessation advised. Nicotine patch ordered.         Depression : mood stable.  Continue home meds       DVT Prophylaxis: lovenox     Diet: Diet NPO Time Specified  Code Status: Full Code    PT/OT Eval Status: to be ordered when ok with ortho/NS     Dispo - pending clinical course     Darrell Santillan MD

## 2020-09-24 NOTE — CONSULTS
Infectious Diseases   Consult Note      Reason for Consult: discitis   Requesting Physician:  Dr. Jennifer Trimble       Date of Admission: 9/23/2020  Subjective:   CHIEF COMPLAINT:  Back pain       HPI:    Eugenia Moore is a 38yoM with history of IVDU, last use reported to be 5/26/20. History of recurrent boils. One month history of worsening atraumatic low back pain. Some fever, chills, night sweats. Had MRI as outpatient showing changes of L5-S1 discitis, epidural phlegmon. He was seen by Neurosgy and IR biopsy ordered. Currently getting IV vanc and cefepime. Current abx:  Cefepime 2g q12  vanc 1g q8       Past Surgical History:       Diagnosis Date    Back pain     Depression 12/9/2019    GERD (gastroesophageal reflux disease)     Hepatitis C     Polysubstance abuse ([address]     Wears glasses          Procedure Laterality Date    APPENDECTOMY      SHOULDER SURGERY Left 4/1/14    UPPER GASTROINTESTINAL ENDOSCOPY  2014       Social History:    TOBACCO:   reports that he has been smoking cigarettes. He has been smoking about 1.00 pack per day. He has never used smokeless tobacco.  ETOH:   reports no history of alcohol use. There is no history of illicit drug use or other significant epidemiologic exposures.       Family History:       Problem Relation Age of Onset    Cancer Mother     Heart Disease Father        Current Medications:    Current Facility-Administered Medications: fentaNYL (SUBLIMAZE) injection 50 mcg, 50 mcg, Intravenous, Q30 Min PRN  buPROPion [facility]) extended release tablet 100 mg, 100 mg, Oral, BID  OLANZapine (ZYPREXA) tablet 10 mg, 10 mg, Oral, Nightly  sodium chloride flush 0.9 % injection 10 mL, 10 mL, Intravenous, 2 times per day  sodium chloride flush 0.9 % injection 10 mL, 10 mL, Intravenous, PRN  acetaminophen (TYLENOL) tablet 650 mg, 650 mg, Oral, Q6H PRN **OR** acetaminophen (TYLENOL) suppository 650 mg, 650 mg, Rectal, Q6H PRN  polyethylene glycol Intake/Output Summary (Last 24 hours) at 9/24/2020 1425  Last data filed at 9/24/2020 1352  Gross per 24 hour   Intake --   Output 3425 ml   Net -3425 ml     CONSTITUTIONAL:  Awake, alert, cooperative, no apparent distress, and appears stated age  [de-identified]: Denisha Snellen, EOMI. Sclera white, conjunctiva full. OP with moist mucosal membranes, no thrush, tongue protrudes midline  NECK:  Supple, symmetrical, trachea midline, no adenopathy  LUNGS:  no increased work of breathing, CTA shelia without W/R/R  CARDIOVASCULAR:  RRR without murmur  ABDOMEN:  normal bowel sounds, soft, flat, NT   PSYCHIATRIC: Oriented to person place and time. No obvious depression or anxiety. MUSCULOSKELETAL: No obvious misalignment or effusion of the joints. No clubbing, cyanosis of the digits. Tenderness lumbar spine   SKIN:   Numerous excoriated lesions limbs and face  Abscess with eschar 2cm R bicep, see photo  NEUROLOGIC: nonfocal exam  ACCESS:   IV site ok             DATA:    Old records have been reviewed    CBC:  Recent Labs     09/23/20  1337 09/24/20  1248   WBC 9.9 9.8   RBC 3.79* 4.50   HGB 11.1* 12.8*   HCT 33.2* 39.0*    405   MCV 87.7 86.6   MCH 29.3 28.5   MCHC 33.4 32.9   RDW 14.0 13.8      BMP:  Recent Labs     09/23/20  1337 09/24/20  1248    139   K 4.2 3.9    105   CO2 24 24   BUN 20 14   CREATININE 1.0 0.9   CALCIUM 8.8 9.4   GLUCOSE 84 99        Cultures:   9/23 [facility] x2 NGTD      Radiology Review:  All pertinent images / reports were reviewed as a part of this visit. MRI L spine w/wo contrast 9/22/20   Subchondral signal changes and enhancement L5-S1 level with associated mild signal seen within the disc. Although the endplates are relatively well-maintained, sequela of early discitis/osteomyelitis remains a primary consideration. There is also some   epidural enhancement noted posterior to the vertebra and disc at this level which could be related to phlegmon.  Correlation for signs and symptoms of infection advised. In addition assessment for leukocytosis, elevated CRP, or sedimentation rate could   also be helpful. Sequela of degenerative signal alteration felt a less likely consideration. Diffuse degenerative changes as described. Findings are most pronounced at the L5-S1 level where there is potential compromise of the L5 and S1 nerve roots. Correlation for L5 and S1 radiculopathy recommended as described. Assessment:     Patient Active Problem List   Diagnosis    Psychoactive substance-induced organic mood disorder ([address]    Ferguson's esophagus with dysplasia    Gastroesophageal reflux disease without esophagitis    Hepatitis C virus infection without hepatic coma    Acute respiratory failure ([address]    IVDU (intravenous drug user)    Acute metabolic encephalopathy    Unresponsive    Polysubstance abuse ([address]    Int[facility] drug overdose ([facility] [address]    Disorder of electrolytes    Leukocytosis    Depression    Osteomyelitis of lumbar spine ([facility] [address]    Tobacco abuse       Kaylyn Tobin is a 38yoM who is evaluated for the following:    History of IVDU but use in the last 4 months denied  Recurrent SSTI  Abscess R bic area    Admitted with 1 month history worsening atraumatic back pain and MRI evidence of L5-S1 discitis    Dysuria     HIV ab 8/14/20 negative   Chronic HCV infection, +RNA 4/16/20     NKDA       Recs:  Discitis from hematogenous seeding either from injection drug use or from recurrent SSTI    Hold abx ahead of planned biopsy in AM to maximize yield of cultures  After biopsy, resume vanc and cefepime     Expect he will need 6-8 weeks abx. Not clear that he would agree to placement for IV abx; not candidate for home IV abx. Check for STI to explain dysuria   Update HIV Screen     D/w patient, questions answered       Clovis Betancur M.D. Thank you for the opportunity to participate in the care of your patient.     Please do not hesitate to contact me:   [phone] office  [phone] mobile

## 2020-09-25 ENCOUNTER — APPOINTMENT (OUTPATIENT)
Dept: CT IMAGING | Age: 40
DRG: 347 | End: 2020-09-25
Payer: COMMERCIAL

## 2020-09-25 LAB
ALBUMIN SERPL-MCNC: 3.8 G/DL (ref 3.4–5)
ANION GAP SERPL CALCULATED.3IONS-SCNC: 11 MMOL/L (ref 3–16)
BASOPHILS ABSOLUTE: 0.1 K/UL (ref 0–0.2)
BASOPHILS RELATIVE PERCENT: 0.7 %
BUN BLDV-MCNC: 18 MG/DL (ref 7–20)
C. TRACHOMATIS DNA ,URINE: NEGATIVE
CALCIUM SERPL-MCNC: 9.4 MG/DL (ref 8.3–10.6)
CHLORIDE BLD-SCNC: 103 MMOL/L (ref 99–110)
CO2: 24 MMOL/L (ref 21–32)
CREAT SERPL-MCNC: 0.9 MG/DL (ref 0.9–1.3)
EKG ATRIAL RATE: 75 BPM
EKG DIAGNOSIS: NORMAL
EKG P AXIS: 80 DEGREES
EKG P-R INTERVAL: 144 MS
EKG Q-T INTERVAL: 392 MS
EKG QRS DURATION: 90 MS
EKG QTC CALCULATION (BAZETT): 437 MS
EKG R AXIS: 25 DEGREES
EKG T AXIS: 68 DEGREES
EKG VENTRICULAR RATE: 75 BPM
EOSINOPHILS ABSOLUTE: 0.1 K/UL (ref 0–0.6)
EOSINOPHILS RELATIVE PERCENT: 1.6 %
GFR AFRICAN AMERICAN: >60
GFR NON-AFRICAN AMERICAN: >60
GLUCOSE BLD-MCNC: 85 MG/DL (ref 70–99)
HCT VFR BLD CALC: 39.4 % (ref 40.5–52.5)
HEMOGLOBIN: 13.2 G/DL (ref 13.5–17.5)
HIV AG/AB: NORMAL
HIV ANTIGEN: NORMAL
HIV-1 ANTIBODY: NORMAL
HIV-2 AB: NORMAL
LYMPHOCYTES ABSOLUTE: 2.6 K/UL (ref 1–5.1)
LYMPHOCYTES RELATIVE PERCENT: 28.3 %
MAGNESIUM: 2.2 MG/DL (ref 1.8–2.4)
MCH RBC QN AUTO: 29 PG (ref 26–34)
MCHC RBC AUTO-ENTMCNC: 33.5 G/DL (ref 31–36)
MCV RBC AUTO: 86.4 FL (ref 80–100)
MONOCYTES ABSOLUTE: 0.8 K/UL (ref 0–1.3)
MONOCYTES RELATIVE PERCENT: 8.7 %
N. GONORRHOEAE DNA, URINE: NEGATIVE
NEUTROPHILS ABSOLUTE: 5.5 K/UL (ref 1.7–7.7)
NEUTROPHILS RELATIVE PERCENT: 60.7 %
PDW BLD-RTO: 13.7 % (ref 12.4–15.4)
PHOSPHORUS: 3.2 MG/DL (ref 2.5–4.9)
PLATELET # BLD: 388 K/UL (ref 135–450)
PMV BLD AUTO: 7.5 FL (ref 5–10.5)
POTASSIUM SERPL-SCNC: 4.3 MMOL/L (ref 3.5–5.1)
RBC # BLD: 4.56 M/UL (ref 4.2–5.9)
SODIUM BLD-SCNC: 138 MMOL/L (ref 136–145)
WBC # BLD: 9 K/UL (ref 4–11)

## 2020-09-25 PROCEDURE — 6360000002 HC RX W HCPCS: Performed by: INTERNAL MEDICINE

## 2020-09-25 PROCEDURE — 87186 SC STD MICRODIL/AGAR DIL: CPT

## 2020-09-25 PROCEDURE — 2580000003 HC RX 258: Performed by: INTERNAL MEDICINE

## 2020-09-25 PROCEDURE — 87077 CULTURE AEROBIC IDENTIFY: CPT

## 2020-09-25 PROCEDURE — 93005 ELECTROCARDIOGRAM TRACING: CPT | Performed by: INTERNAL MEDICINE

## 2020-09-25 PROCEDURE — 6370000000 HC RX 637 (ALT 250 FOR IP): Performed by: INTERNAL MEDICINE

## 2020-09-25 PROCEDURE — 83735 ASSAY OF MAGNESIUM: CPT

## 2020-09-25 PROCEDURE — 80069 RENAL FUNCTION PANEL: CPT

## 2020-09-25 PROCEDURE — 36415 COLL VENOUS BLD VENIPUNCTURE: CPT

## 2020-09-25 PROCEDURE — 87491 CHLMYD TRACH DNA AMP PROBE: CPT

## 2020-09-25 PROCEDURE — 0SJ33ZZ INSPECTION OF LUMBOSACRAL JOINT, PERCUTANEOUS APPROACH: ICD-10-PCS | Performed by: RADIOLOGY

## 2020-09-25 PROCEDURE — 85025 COMPLETE CBC W/AUTO DIFF WBC: CPT

## 2020-09-25 PROCEDURE — 93010 ELECTROCARDIOGRAM REPORT: CPT | Performed by: INTERNAL MEDICINE

## 2020-09-25 PROCEDURE — 99232 SBSQ HOSP IP/OBS MODERATE 35: CPT | Performed by: INTERNAL MEDICINE

## 2020-09-25 PROCEDURE — 87591 N.GONORRHOEAE DNA AMP PROB: CPT

## 2020-09-25 PROCEDURE — 87205 SMEAR GRAM STAIN: CPT

## 2020-09-25 PROCEDURE — 2709999900 CT DISC ASPIRATION

## 2020-09-25 PROCEDURE — 87070 CULTURE OTHR SPECIMN AEROBIC: CPT

## 2020-09-25 PROCEDURE — 1200000000 HC SEMI PRIVATE

## 2020-09-25 PROCEDURE — 87075 CULTR BACTERIA EXCEPT BLOOD: CPT

## 2020-09-25 RX ADMIN — OXYCODONE HYDROCHLORIDE 5 MG: 5 TABLET ORAL at 17:21

## 2020-09-25 RX ADMIN — CYCLOBENZAPRINE 10 MG: 10 TABLET, FILM COATED ORAL at 07:58

## 2020-09-25 RX ADMIN — OXYCODONE HYDROCHLORIDE 5 MG: 5 TABLET ORAL at 03:29

## 2020-09-25 RX ADMIN — BUPROPION HYDROCHLORIDE 100 MG: 100 TABLET, EXTENDED RELEASE ORAL at 21:21

## 2020-09-25 RX ADMIN — OLANZAPINE 10 MG: 5 TABLET, FILM COATED ORAL at 21:21

## 2020-09-25 RX ADMIN — BUPROPION HYDROCHLORIDE 100 MG: 100 TABLET, EXTENDED RELEASE ORAL at 07:58

## 2020-09-25 RX ADMIN — MORPHINE SULFATE 2 MG: 2 INJECTION, SOLUTION INTRAMUSCULAR; INTRAVENOUS at 16:01

## 2020-09-25 RX ADMIN — OXYCODONE HYDROCHLORIDE 5 MG: 5 TABLET ORAL at 07:59

## 2020-09-25 RX ADMIN — OXYCODONE HYDROCHLORIDE 5 MG: 5 TABLET ORAL at 12:19

## 2020-09-25 RX ADMIN — SODIUM CHLORIDE, PRESERVATIVE FREE 10 ML: 5 INJECTION INTRAVENOUS at 21:22

## 2020-09-25 RX ADMIN — OXYCODONE HYDROCHLORIDE 5 MG: 5 TABLET ORAL at 21:21

## 2020-09-25 ASSESSMENT — PAIN SCALES - GENERAL
PAINLEVEL_OUTOF10: 8
PAINLEVEL_OUTOF10: 9
PAINLEVEL_OUTOF10: 8
PAINLEVEL_OUTOF10: 8
PAINLEVEL_OUTOF10: 10
PAINLEVEL_OUTOF10: 6
PAINLEVEL_OUTOF10: 10

## 2020-09-25 ASSESSMENT — PAIN DESCRIPTION - ORIENTATION
ORIENTATION: LOWER
ORIENTATION: LEFT

## 2020-09-25 ASSESSMENT — PAIN DESCRIPTION - ONSET: ONSET: ON-GOING

## 2020-09-25 ASSESSMENT — PAIN DESCRIPTION - LOCATION
LOCATION: BACK
LOCATION: BACK;BUTTOCKS;LEG;FOOT

## 2020-09-25 ASSESSMENT — PAIN DESCRIPTION - PAIN TYPE
TYPE: ACUTE PAIN
TYPE: ACUTE PAIN

## 2020-09-25 ASSESSMENT — PAIN DESCRIPTION - FREQUENCY: FREQUENCY: CONTINUOUS

## 2020-09-25 ASSESSMENT — PAIN DESCRIPTION - DESCRIPTORS: DESCRIPTORS: BURNING;CONSTANT

## 2020-09-25 NOTE — PROGRESS NOTES
above.         Results called by Dr. Mike Arrieta to [facility] Number on 9/25/2020 at 16:40

## 2020-09-25 NOTE — PROGRESS NOTES
Per dr. Justine eLwis she spoke with dr Yolande Lam who states to keep patient off of iv atb so we can attempt another specimen on Monday. RN to put NPO orders for Sunday at midnight.

## 2020-09-25 NOTE — PROGRESS NOTES
Pt ate 3 candy bars and half a mountain dew 20 oz. When asked why he ate after being NPO pt responded \" well they brought me a tray this morning so it wasn't my fault. \" and \" that tall doctor told me last night that I could eat because they weren't putting me to sleep or anything. \" Reinforced the need for NPO at this time with Pt and his visitor.

## 2020-09-25 NOTE — PROGRESS NOTES
Hospitalist Progress Note      PCP: Tuan Pearce, APRN - CNP    Date of Admission: 9/23/2020    Chief Complaint: low back pain       Hospital Course: H & P reviewed. Admitted with lumbosacral discitis/ osteomyelitis      Subjective:     Pt reports back pain improved with no chills/ fever. Bigeminy noted on tele. Pt asymptomatic. Denied any chest pain, palpitations    Medications:  Reviewed    Infusion Medications   Scheduled Medications    buPROPion  100 mg Oral BID    OLANZapine  10 mg Oral Nightly    sodium chloride flush  10 mL Intravenous 2 times per day    enoxaparin  40 mg Subcutaneous Daily    nicotine  1 patch Transdermal Daily     PRN Meds: perflutren lipid microspheres, zolpidem, fentanNYL, sodium chloride flush, acetaminophen **OR** acetaminophen, polyethylene glycol, promethazine **OR** ondansetron, oxyCODONE, morphine, cyclobenzaprine      Intake/Output Summary (Last 24 hours) at 9/25/2020 1153  Last data filed at 9/25/2020 0610  Gross per 24 hour   Intake 130 ml   Output 1300 ml   Net -1170 ml       Physical Exam Performed:    /83   Pulse 80   Temp 98 °F (36.7 °C) (Oral)   Resp 16   Ht 5' 9\" (1.753 m)   Wt 150 lb (68 kg)   SpO2 97%   BMI 22.15 kg/m²     General appearance: In mild distress due to pain, appears stated age and cooperative. HEENT:  Normal cephalic, atraumatic without obvious deformity. Conjunctivae/corneas clear. Neck: Supple, with full range of motion. No jugular venous distention. Trachea midline. Respiratory:  Normal respiratory effort. Clear to auscultation, bilaterally without Rales/Wheezes/Rhonchi. Cardiovascular:  Regular rate and rhythm with normal S1/S2 without murmurs, rubs or gallops. Abdomen: Soft, non-tender, non-distended with normal bowel sounds. Musculoskeletal:  No clubbing, cyanosis or edema bilaterally.   Skin: scattered healed abrasions on bilateral extremities   Neurologic:neurovascularly intact without any focal sensory/motor deficits. Cranial nerves: II-XII intact, grossly non-focal.  Psychiatric:  Alert and oriented, thought content appropriate, normal insight      Labs:   Recent Labs     09/23/20  1337 09/24/20  1248   WBC 9.9 9.8   HGB 11.1* 12.8*   HCT 33.2* 39.0*    405     Recent Labs     09/23/20  1337 09/24/20  1248    139   K 4.2 3.9    105   CO2 24 24   BUN 20 14   CREATININE 1.0 0.9   CALCIUM 8.8 9.4     Recent Labs     09/23/20  1337   AST 26   ALT 33   BILITOT 0.5   ALKPHOS 78     Recent Labs     09/23/20  1337   INR 1.00     No results for input(s): Hillary Liao in the last 72 hours. Urinalysis:      Lab Results   Component Value Date    NITRU Negative 09/23/2020    WBCUA 0-2 03/21/2018    RBCUA 0-2 03/21/2018    BLOODU Negative 09/23/2020    SPECGRAV 1.010 09/23/2020    GLUCOSEU Negative 09/23/2020       Radiology:  No orders to display           Assessment/Plan:    Active Hospital Problems    Diagnosis    Osteomyelitis of lumbar spine ([facility] [address] [M46.26]     Priority: High    Tobacco abuse [Z72.0]    Depression [F32.9]     Lumbosacral discitis / osteomyelitis with radiculopathy: involving L5-S1 with possible phlegmon noted on outpatient MRI. ESR and CRP were elevated. No overt signs of cord compression currently. Neurosurgery and ID recs appreciated. Disc space aspiration by IR planned for today but already ate breakfast. RN to verify is procedure will still be done. Abx held by ID till aspiration done. Continue  Pain control, flexeril PRN      Hx of IVDU: pt reports he has not used any illicit drugs since May of last year.         Tobacco abuse: cessation advised. On  Nicotine patch         Depression : mood stable. Continue home meds     Bigeminy : noted on tele. Pt asymptomatic. EKG NSR with no arrhythmia noted. Renal panel, mag pending to assess electrolytes. Cardiac echo ordered.       DVT Prophylaxis: lovenox     Diet: Diet NPO Effective Now  Code Status: Full Code    PT/OT Eval Status: Not yet ordered       Dispo - pending clinical course     Mitchell Shelley MD

## 2020-09-25 NOTE — PROGRESS NOTES
IR Attending    Reviewed T.C.'s chart - outside MRI reports findings of L5-S1 discitis     I have requested those images be loaded into our PACS to evaluate for L5-S1 disc aspiration     Please keep NPO and hold anticoagulants

## 2020-09-25 NOTE — CARE COORDINATION
Pt followed by IM and ID. IPTA w/hx IV drug use. Pt states he is willing to do OP IV daily infusions w/ID. Per ID, pt will likely dc/ on Monday. DCP will continue to follow for needs.   Adriana Carrillo RN

## 2020-09-25 NOTE — PLAN OF CARE
Problem: Pain:  Description: Pain management should include both nonpharmacologic and pharmacologic interventions.   Goal: Pain level will decrease  Description: Pain level will decrease  Outcome: Ongoing

## 2020-09-25 NOTE — PROGRESS NOTES
Page sent to MD:    Pt is on tele and has an occasional irregular heart rate/rhythm. Has had a few runs of ventricular bigeminy yesterday and today. do you want any new orders? Waiting on response.

## 2020-09-25 NOTE — PROGRESS NOTES
Infectious Disease Follow up Notes    CC :  Lumbar discitis      Antibiotics:   None     Admit Date:   9/23/2020  Hospital Day: 3    Subjective:   He remains afebrile he says he is having slightly less pain  No new concerns  Frequent ectopy/bigeminy per RN   Patient denies chest pain, SOB     Objective:     Patient Vitals for the past 8 hrs:   BP Temp Temp src Pulse Resp SpO2   09/25/20 0758 -- -- -- 80 -- --   09/25/20 0757 119/83 98 °F (36.7 °C) Oral 75 16 97 %   09/25/20 0314 (!) 146/82 97.5 °F (36.4 °C) Oral 73 18 98 %       EXAM:  General:   Alert, oriented, NAD    HEENT:  NCAT, PERRL, sclera anicteric    LUNGS:  CTA shelia without W/R/R    CV:  RRR     ABD: Soft, flat, NT     EXT:  R bicep abscess unroofed, +purulence cultured           LINE: IV site ok         Scheduled Meds:   buPROPion  100 mg Oral BID    OLANZapine  10 mg Oral Nightly    sodium chloride flush  10 mL Intravenous 2 times per day    enoxaparin  40 mg Subcutaneous Daily    nicotine  1 patch Transdermal Daily         Data Review:    Lab Results   Component Value Date    WBC 9.8 09/24/2020    HGB 12.8 (L) 09/24/2020    HCT 39.0 (L) 09/24/2020    MCV 86.6 09/24/2020     09/24/2020     Lab Results   Component Value Date    CREATININE 0.9 09/24/2020    BUN 14 09/24/2020     09/24/2020    K 3.9 09/24/2020     09/24/2020    CO2 24 09/24/2020       Hepatic Function Panel:   Lab Results   Component Value Date    ALKPHOS 78 09/23/2020    ALT 33 09/23/2020    AST 26 09/23/2020    PROT 6.7 09/23/2020    PROT 8.8 12/20/2011    BILITOT 0.5 09/23/2020    LABALBU 3.3 09/23/2020       Cultures:   9/23     BC x2 NGTD         Radiology Review:  All pertinent images / reports were reviewed as a part of this visit. MRI L spine w/wo contrast 9/22/20   Subchondral signal changes and enhancement L5-S1 level with associated mild signal seen within the disc.  Although the endplates are relatively well-maintained, sequela of early discitis/osteomyelitis remains a primary consideration. There is also some   epidural enhancement noted posterior to the vertebra and disc at this level which could be related to phlegmon. Correlation for signs and symptoms of infection advised. In addition assessment for leukocytosis, elevated CRP, or sedimentation rate could   also be helpful. Sequela of degenerative signal alteration felt a less likely consideration. Diffuse degenerative changes as described. Findings are most pronounced at the L5-S1 level where there is potential compromise of the L5 and S1 nerve roots. Correlation for L5 and S1 radiculopathy recommended as described.       Assessment:     Patient Active Problem List    Diagnosis Date Noted    Osteomyelitis of lumbar spine ([address] 09/23/2020    Tobacco abuse 09/23/2020    Depression 12/09/2019    Acute respiratory failure ([address] 12/05/2019    IVDU (intravenous drug user) 12/05/2019    Acute metabolic encephalopathy 08/33/9480    Unresponsive     Polysubstance abuse ([address]     Intentional drug overdose ([address]     Disorder of electrolytes     Leukocytosis     Hepatitis C virus infection without hepatic coma 03/25/2016    Ferguson's esophagus with dysplasia 03/22/2016    Gastroesophageal reflux disease without esophagitis 03/22/2016    Psychoactive substance-induced organic mood disorder ([address] 12/21/2011        History of IVDU but use in the last 4 months denied    Recurrent SSTI  Abscess R bicep area - culture collected      Admitted with 1 month history worsening atraumatic back pain and MRI evidence of L5-S1 discitis  IR biopsy planned for today, abx held      Dysuria   Urine GC/CT ordered.   UA was negative      HIV ab 8/14/20 negative   Chronic HCV infection, +RNA 4/16/20      NKDA     Plan:   Echo     IR disc biopsy then resume vanc and cefepime     Final abx recs TBD by cultures  Discussed standard of care 6-8 weeks culture-directed IV abx. He is not candidate for home IV abx.   He is interested in coming to infusion center daily for admin through [facility]     HIV, STI screens ordered, pending     Wound culture sent    Please call with questions over the weekend     Discussed with patient/family, all questions answered  D/w RN       Anne Bartholomew MD  Phone: [phone]   Fax : [phone]

## 2020-09-26 VITALS
DIASTOLIC BLOOD PRESSURE: 75 MMHG | HEIGHT: 69 IN | OXYGEN SATURATION: 97 % | SYSTOLIC BLOOD PRESSURE: 124 MMHG | WEIGHT: 150 LBS | RESPIRATION RATE: 16 BRPM | BODY MASS INDEX: 22.22 KG/M2 | TEMPERATURE: 98.1 F | HEART RATE: 81 BPM

## 2020-09-26 LAB
LV EF: 58 %
LVEF MODALITY: NORMAL

## 2020-09-26 PROCEDURE — 93306 TTE W/DOPPLER COMPLETE: CPT

## 2020-09-26 PROCEDURE — 6360000002 HC RX W HCPCS: Performed by: INTERNAL MEDICINE

## 2020-09-26 PROCEDURE — 6370000000 HC RX 637 (ALT 250 FOR IP): Performed by: INTERNAL MEDICINE

## 2020-09-26 PROCEDURE — 2580000003 HC RX 258: Performed by: INTERNAL MEDICINE

## 2020-09-26 RX ADMIN — ENOXAPARIN SODIUM 40 MG: 40 INJECTION SUBCUTANEOUS at 09:10

## 2020-09-26 RX ADMIN — OXYCODONE HYDROCHLORIDE 5 MG: 5 TABLET ORAL at 09:19

## 2020-09-26 RX ADMIN — OXYCODONE HYDROCHLORIDE 5 MG: 5 TABLET ORAL at 03:20

## 2020-09-26 RX ADMIN — MORPHINE SULFATE 2 MG: 2 INJECTION, SOLUTION INTRAMUSCULAR; INTRAVENOUS at 00:09

## 2020-09-26 ASSESSMENT — PAIN SCALES - GENERAL
PAINLEVEL_OUTOF10: 8
PAINLEVEL_OUTOF10: 7
PAINLEVEL_OUTOF10: 8
PAINLEVEL_OUTOF10: 9

## 2020-09-26 ASSESSMENT — PAIN DESCRIPTION - ORIENTATION: ORIENTATION: LOWER;LEFT

## 2020-09-26 ASSESSMENT — PAIN DESCRIPTION - PAIN TYPE: TYPE: ACUTE PAIN

## 2020-09-26 ASSESSMENT — PAIN DESCRIPTION - LOCATION: LOCATION: BACK;BUTTOCKS

## 2020-09-26 NOTE — DISCHARGE SUMMARY
Hospital Medicine Discharge Summary    Patient ID: Stella Freitas      Patient's PCP: Tory Luis, APRN - CNP    Admit Date: 9/23/2020     Discharge Date: 9/26/2020      Admitting Physician: Fior Bah MD     Discharge Physician: Fior Bah MD     Discharge Diagnoses: Active Hospital Problems    Diagnosis    Osteomyelitis of lumbar spine [facility]) [M46.26]     Priority: High    Tobacco abuse [Z72.0]    Depression [F32.9]       The patient was seen and examined on day of discharge and this discharge summary is in conjunction with any daily progress note from day of discharge. HPI on 9/23/2020 :   37 y.o. male with hx of back pain , depression, hx of IVDU who presented to [facility] with severe low back pain for weeks with radiation down his rt leg causing difficulty ambulating. Saw his PCP and had an MRI done yesterday which showed finding suggestive of early discitis /osteomyelitis of L5-S1 with possible phlegmon with potential compromise of L5-S1 nerve roots. He was sent to ER for further eval. Pt reports he has had occasional urinary incontinence in the past few days needing to change his clothes with no stool incontinence or saddle anesthesia. Denied any fever or chills. No recent trauma to his back. Hx of IVDU but quit illicit drugs about a year ago. Neurosurgery was consulted in ER. Hospital Course:   Lumbosacral discitis / osteomyelitis with radiculopathy: involving L5-S1 with possible phlegmon noted on outpatient MRI. ESR and CRP were elevated. No overt signs of cord compression. Neurosurgery consulted with no acute surgical intervention needed. He was on IV cefepime and IV vancomycin - held for IR disc biopsy recommended. He had CT guided disc  aspiration by IR done on 9/25 was unsuccessful due to pt's pain and he requested for the procedure to be terminated.  Discussed with ID, Dr Boom Phelps who recommended to continue holding abx till repeat CT aspiration to be done on Monday, 9/28/2020 as he would need  6-8 week culture directed IV abx and not a candidate for IV home abx. Pt not septic with improved back  pain. However pt decided to sign out AMA as not willing to stay inpatient over weekend for procedure.            Hx of IVDU: pt reports he has not used any illicit drugs since May of last year.         Tobacco abuse: cessation advised. On  Nicotine patch was provided inpatient        Depression : mood stable. Continue home meds     Bigeminy : noted on tele. Pt asymptomatic. EKG NSR with no arrhythmia noted. Electrolytes normal.  Cardiac echo with normal EF, no obvious vegetation or mass noted. Was monitored on tele. Physical Exam Performed:     /75   Pulse 81   Temp 98.1 °F (36.7 °C) (Oral)   Resp 16   Ht 5' 9\" (1.753 m)   Wt 150 lb (68 kg)   SpO2 97%   BMI 22.15 kg/m²   General appearance:  In mild distress due to pain, appears stated age and cooperative. HEENT:  Normal cephalic, atraumatic without obvious deformity.  Conjunctivae/corneas clear. Neck: Supple, with full range of motion. No jugular venous distention. Trachea midline. Respiratory:  Normal respiratory effort. Clear to auscultation, bilaterally without Rales/Wheezes/Rhonchi. Cardiovascular:  Regular rate and rhythm with normal S1/S2 without murmurs, rubs or gallops. Abdomen: Soft, non-tender, non-distended with normal bowel sounds. Musculoskeletal:  No clubbing, cyanosis or edema bilaterally. Skin: scattered healed abrasions on bilateral extremities   Neurologic:neurovascularly intact without any focal sensory/motor deficits. Cranial nerves: II-XII intact, grossly non-focal.  Psychiatric:  Alert and oriented, thought content appropriate, normal insight          Labs:  For convenience and continuity at follow-up the following most recent labs are provided:      CBC:    Lab Results   Component Value Date    WBC 9.0 09/25/2020    HGB 13.2 09/25/2020    HCT 39.4 09/25/2020     09/25/2020       Renal:    Lab Results   Component Value Date     09/25/2020    K 4.3 09/25/2020    K 3.9 09/24/2020     09/25/2020    CO2 24 09/25/2020    BUN 18 09/25/2020    CREATININE 0.9 09/25/2020    CALCIUM 9.4 09/25/2020    PHOS 3.2 09/25/2020         Significant Diagnostic Studies    Radiology:   CT DISC ASPIRATION   Final Result   Unsuccessful CT-guided L5-S1 disc aspiration secondary to the patient   experiencing pain and requesting that the procedure be terminated as   described above. Results called by Dr. Malika Chang to Thea De La Os on 9/25/2020 at   16:40. Consults:     IP CONSULT TO NEUROSURGERY  IP CONSULT TO HOSPITALIST  IP CONSULT TO INFECTIOUS DISEASES  IP CONSULT TO PHARMACY  IP CONSULT TO SPIRITUAL SERVICES  IP CONSULT TO ORTHOPEDIC SURGERY  IP CONSULT TO DIAGNOSTIC RADIOLOGY    Disposition:  Signed out AMA     Condition at Discharge: Stable    Discharge Instructions/Follow-up:  None as signed out AMA     Code Status:  Prior     Activity: activity as tolerated    Diet: regular diet      Discharge Medications:     Discharge Medication List as of 9/26/2020 10:49 AM           Details   buPROPion (WELLBUTRIN SR) 100 MG extended release tablet Take 100 mg by mouth 2 times dailyHistorical Med      ibuprofen (ADVIL;MOTRIN) 800 MG tablet Take 800 mg by mouth every 6 hours as needed for PainHistorical Med      pantoprazole (PROTONIX) 40 MG tablet Take 40 mg by mouth dailyHistorical Med      OLANZapine (ZYPREXA) 10 MG tablet Take 1 tablet by mouth nightly, Disp-30 tablet, R-0Normal             Time Spent on discharge is more than 30 minutes in the examination, evaluation, counseling and review of medications and discharge plan. Signed:    Mitchell Shelley MD   9/26/2020      Thank you JORDY Chaudhry - SRINIVASAN for the opportunity to be involved in this patient's care.  If you have any questions or concerns please feel free to contact me at [phone].

## 2020-09-26 NOTE — PLAN OF CARE
Problem: Pain:  Description: Pain management should include both nonpharmacologic and pharmacologic interventions. Goal: Pain level will decrease  Description: Pain level will decrease  9/26/2020 0024 by Reanna Walls RN  Outcome: Ongoing  9/25/2020 1942 by Gwendloyn Spurling, RN  Outcome: Ongoing     Problem: Pain:  Description: Pain management should include both nonpharmacologic and pharmacologic interventions.   Goal: Control of acute pain  Description: Control of acute pain  9/26/2020 0024 by Reanna Walls RN  Outcome: Ongoing  9/25/2020 1942 by Gwendloyn Spurling, RN  Outcome: Ongoing

## 2020-09-27 LAB
BLOOD CULTURE, ROUTINE: NORMAL
CULTURE, BLOOD 2: NORMAL

## 2020-09-27 NOTE — PROGRESS NOTES
Patient signed out AMA. Dr. Kirt Comer came to bedside to talk with patient but patient did not see the point in sitting here doing nothing all weekend. He states he will be back Sunday night or Monday morning. Noted. Patient's IV was already out when RN arrived this morning. Patient had ride to pick him up and patient ambulated self out.

## 2020-09-28 ENCOUNTER — TELEPHONE (OUTPATIENT)
Dept: INFECTIOUS DISEASES | Age: 40
End: 2020-09-28

## 2020-09-28 NOTE — TELEPHONE ENCOUNTER
Called patient  He left AMA over the weekend  Has evidence of discitis and was waiting for disc aspiration on Monday     Echo reviewed - looks ok    Wound culture R arm - MRSA.   \"Arm hurts\"    Advised patient to return for treatment  Questions answered

## 2020-09-30 LAB
ANAEROBIC CULTURE: ABNORMAL
GRAM STAIN RESULT: ABNORMAL
ORGANISM: ABNORMAL
WOUND/ABSCESS: ABNORMAL

## 2020-10-29 ENCOUNTER — HOSPITAL ENCOUNTER (EMERGENCY)
Age: 40
Discharge: LEFT BEFORE DISCHARGE | End: 2020-10-29
Payer: COMMERCIAL

## 2020-10-29 VITALS
RESPIRATION RATE: 22 BRPM | WEIGHT: 150 LBS | OXYGEN SATURATION: 100 % | HEART RATE: 113 BPM | BODY MASS INDEX: 22.73 KG/M2 | TEMPERATURE: 99.1 F | SYSTOLIC BLOOD PRESSURE: 127 MMHG | HEIGHT: 68 IN | DIASTOLIC BLOOD PRESSURE: 85 MMHG

## 2020-10-29 ASSESSMENT — PAIN DESCRIPTION - PAIN TYPE: TYPE: ACUTE PAIN

## 2020-10-29 ASSESSMENT — PAIN SCALES - GENERAL: PAINLEVEL_OUTOF10: 9

## 2020-10-29 ASSESSMENT — PAIN DESCRIPTION - LOCATION: LOCATION: BACK

## 2020-11-03 ENCOUNTER — TELEPHONE (OUTPATIENT)
Dept: INFECTIOUS DISEASES | Age: 40
End: 2020-11-03

## 2020-11-03 NOTE — TELEPHONE ENCOUNTER
Patient calling in regards to back pain. He stated he saw you as an inpatient and is having difficulty getting any help or answers. Back pain is getting worse rates pain at 8-9. Has difficulty ambulating, lays on heating pad at night. Legs go numb and pain goes up back and to testicles. He is reaching out to you for some help. I did try to gather more information but call got disconnected and I attempted to call patient back but his phone stated he was unavailable. Please advise if you would like for me to schedule an appointment or another avenue for patient. Patient returned call and said that when they did spinal aspiration patient felt like his leg was on fire, butt when numb, toes and feet fell asleep. He stated that when they did the procedure they hit something and that the messed something up. The doctor at [facility] [place] ordered antibiotic. Patient stated that he cannot take the Wellbutrin with linezolid 600 mg BID as his pharmacist informed him.  Again he is reaching out to you for your help as he remembered you were helpful with him in the hospital.

## 2020-11-03 NOTE — TELEPHONE ENCOUNTER
Joe has infection in the spine and needs to come back to the hospital for treatment (left AMA without treatment back in 9/2020)  Probably best to go through ED   Could you please let him know?

## 2020-11-04 NOTE — TELEPHONE ENCOUNTER
Called patient again and unable to leave VM as message just stated the caller you are trying to reach is unavailable.

## 2020-11-06 NOTE — TELEPHONE ENCOUNTER
Spoke with patient   He was admitted to [facility] 10/8-10/14/20 with discitis, had disc biopsy that was negative     Gave Rx linezolid x4 week supply   He is still taking welbutrin as well     His pain has worsened, now radiating up the back   No fever    He should stop linezolid for lack of efficacy and concern serotonin syndrome     He will need restaging of the infection via MRI and 6-8 weeks IV abx. He indicates an understanding and willingness to go to SNF for treatment. Wants to consider his options. Advised admission through ED.   All questions answered

## 2020-11-24 ENCOUNTER — HOSPITAL ENCOUNTER (EMERGENCY)
Age: 40
Discharge: ANOTHER HOSPITAL | End: 2020-11-25
Attending: EMERGENCY MEDICINE
Payer: COMMERCIAL

## 2020-11-24 ENCOUNTER — APPOINTMENT (OUTPATIENT)
Dept: MRI IMAGING | Age: 40
End: 2020-11-24
Payer: COMMERCIAL

## 2020-11-24 ENCOUNTER — APPOINTMENT (OUTPATIENT)
Dept: GENERAL RADIOLOGY | Age: 40
End: 2020-11-24
Payer: COMMERCIAL

## 2020-11-24 ENCOUNTER — APPOINTMENT (OUTPATIENT)
Dept: ULTRASOUND IMAGING | Age: 40
End: 2020-11-24
Payer: COMMERCIAL

## 2020-11-24 PROBLEM — G06.2 EPIDURAL ABSCESS: Status: ACTIVE | Noted: 2020-11-24

## 2020-11-24 LAB
A/G RATIO: 1.1 (ref 1.1–2.2)
ALBUMIN SERPL-MCNC: 4 G/DL (ref 3.4–5)
ALP BLD-CCNC: 103 U/L (ref 40–129)
ALT SERPL-CCNC: 39 U/L (ref 10–40)
AMPHETAMINE SCREEN, URINE: POSITIVE
ANION GAP SERPL CALCULATED.3IONS-SCNC: 8 MMOL/L (ref 3–16)
AST SERPL-CCNC: 38 U/L (ref 15–37)
BARBITURATE SCREEN URINE: ABNORMAL
BASOPHILS ABSOLUTE: 0.1 K/UL (ref 0–0.2)
BASOPHILS RELATIVE PERCENT: 0.8 %
BENZODIAZEPINE SCREEN, URINE: ABNORMAL
BILIRUB SERPL-MCNC: 0.3 MG/DL (ref 0–1)
BUN BLDV-MCNC: 20 MG/DL (ref 7–20)
CALCIUM SERPL-MCNC: 8.4 MG/DL (ref 8.3–10.6)
CANNABINOID SCREEN URINE: ABNORMAL
CHLORIDE BLD-SCNC: 101 MMOL/L (ref 99–110)
CO2: 23 MMOL/L (ref 21–32)
COCAINE METABOLITE SCREEN URINE: ABNORMAL
CREAT SERPL-MCNC: 1.2 MG/DL (ref 0.9–1.3)
EOSINOPHILS ABSOLUTE: 0.2 K/UL (ref 0–0.6)
EOSINOPHILS RELATIVE PERCENT: 0.9 %
GFR AFRICAN AMERICAN: >60
GFR NON-AFRICAN AMERICAN: >60
GLOBULIN: 3.5 G/DL
GLUCOSE BLD-MCNC: 85 MG/DL (ref 70–99)
HCT VFR BLD CALC: 37.1 % (ref 40.5–52.5)
HEMOGLOBIN: 12.3 G/DL (ref 13.5–17.5)
LACTIC ACID: 0.9 MMOL/L (ref 0.4–2)
LYMPHOCYTES ABSOLUTE: 2.5 K/UL (ref 1–5.1)
LYMPHOCYTES RELATIVE PERCENT: 13.9 %
Lab: ABNORMAL
MCH RBC QN AUTO: 28.9 PG (ref 26–34)
MCHC RBC AUTO-ENTMCNC: 33.1 G/DL (ref 31–36)
MCV RBC AUTO: 87.3 FL (ref 80–100)
METHADONE SCREEN, URINE: ABNORMAL
MONOCYTES ABSOLUTE: 1.1 K/UL (ref 0–1.3)
MONOCYTES RELATIVE PERCENT: 6 %
NEUTROPHILS ABSOLUTE: 14 K/UL (ref 1.7–7.7)
NEUTROPHILS RELATIVE PERCENT: 78.4 %
OPIATE SCREEN URINE: ABNORMAL
OXYCODONE URINE: ABNORMAL
PDW BLD-RTO: 15 % (ref 12.4–15.4)
PH UA: 5
PHENCYCLIDINE SCREEN URINE: ABNORMAL
PLATELET # BLD: 418 K/UL (ref 135–450)
PMV BLD AUTO: 7.6 FL (ref 5–10.5)
POTASSIUM REFLEX MAGNESIUM: 4.2 MMOL/L (ref 3.5–5.1)
PROPOXYPHENE SCREEN: ABNORMAL
RBC # BLD: 4.25 M/UL (ref 4.2–5.9)
REASON FOR REJECTION: NORMAL
REJECTED TEST: NORMAL
SARS-COV-2, NAAT: NOT DETECTED
SEDIMENTATION RATE, ERYTHROCYTE: 19 MM/HR (ref 0–15)
SODIUM BLD-SCNC: 132 MMOL/L (ref 136–145)
TOTAL PROTEIN: 7.5 G/DL (ref 6.4–8.2)
TROPONIN: <0.01 NG/ML
WBC # BLD: 17.9 K/UL (ref 4–11)

## 2020-11-24 PROCEDURE — 96375 TX/PRO/DX INJ NEW DRUG ADDON: CPT

## 2020-11-24 PROCEDURE — 87040 BLOOD CULTURE FOR BACTERIA: CPT

## 2020-11-24 PROCEDURE — 6360000004 HC RX CONTRAST MEDICATION: Performed by: EMERGENCY MEDICINE

## 2020-11-24 PROCEDURE — 6370000000 HC RX 637 (ALT 250 FOR IP): Performed by: EMERGENCY MEDICINE

## 2020-11-24 PROCEDURE — 76870 US EXAM SCROTUM: CPT

## 2020-11-24 PROCEDURE — 71045 X-RAY EXAM CHEST 1 VIEW: CPT

## 2020-11-24 PROCEDURE — 85652 RBC SED RATE AUTOMATED: CPT

## 2020-11-24 PROCEDURE — U0002 COVID-19 LAB TEST NON-CDC: HCPCS

## 2020-11-24 PROCEDURE — 93005 ELECTROCARDIOGRAM TRACING: CPT

## 2020-11-24 PROCEDURE — 80053 COMPREHEN METABOLIC PANEL: CPT

## 2020-11-24 PROCEDURE — 80307 DRUG TEST PRSMV CHEM ANLYZR: CPT

## 2020-11-24 PROCEDURE — 84484 ASSAY OF TROPONIN QUANT: CPT

## 2020-11-24 PROCEDURE — 86140 C-REACTIVE PROTEIN: CPT

## 2020-11-24 PROCEDURE — 96367 TX/PROPH/DG ADDL SEQ IV INF: CPT

## 2020-11-24 PROCEDURE — 6360000002 HC RX W HCPCS: Performed by: EMERGENCY MEDICINE

## 2020-11-24 PROCEDURE — 99284 EMERGENCY DEPT VISIT MOD MDM: CPT

## 2020-11-24 PROCEDURE — 2580000003 HC RX 258: Performed by: EMERGENCY MEDICINE

## 2020-11-24 PROCEDURE — 83605 ASSAY OF LACTIC ACID: CPT

## 2020-11-24 PROCEDURE — 96365 THER/PROPH/DIAG IV INF INIT: CPT

## 2020-11-24 PROCEDURE — A9579 GAD-BASE MR CONTRAST NOS,1ML: HCPCS | Performed by: EMERGENCY MEDICINE

## 2020-11-24 PROCEDURE — 85025 COMPLETE CBC W/AUTO DIFF WBC: CPT

## 2020-11-24 PROCEDURE — 72158 MRI LUMBAR SPINE W/O & W/DYE: CPT

## 2020-11-24 RX ORDER — OXYCODONE HYDROCHLORIDE AND ACETAMINOPHEN 5; 325 MG/1; MG/1
1 TABLET ORAL ONCE
Status: COMPLETED | OUTPATIENT
Start: 2020-11-24 | End: 2020-11-24

## 2020-11-24 RX ORDER — KETOROLAC TROMETHAMINE 30 MG/ML
15 INJECTION, SOLUTION INTRAMUSCULAR; INTRAVENOUS ONCE
Status: COMPLETED | OUTPATIENT
Start: 2020-11-24 | End: 2020-11-24

## 2020-11-24 RX ADMIN — CEFEPIME 2 G: 2 INJECTION, POWDER, FOR SOLUTION INTRAVENOUS at 16:53

## 2020-11-24 RX ADMIN — KETOROLAC TROMETHAMINE 15 MG: 30 INJECTION, SOLUTION INTRAMUSCULAR at 16:49

## 2020-11-24 RX ADMIN — VANCOMYCIN HYDROCHLORIDE 1000 MG: 1 INJECTION, POWDER, LYOPHILIZED, FOR SOLUTION INTRAVENOUS at 17:52

## 2020-11-24 RX ADMIN — GADOTERIDOL 14 ML: 279.3 INJECTION, SOLUTION INTRAVENOUS at 20:46

## 2020-11-24 RX ADMIN — HYDROMORPHONE HYDROCHLORIDE 1 MG: 1 INJECTION, SOLUTION INTRAMUSCULAR; INTRAVENOUS; SUBCUTANEOUS at 19:00

## 2020-11-24 RX ADMIN — OXYCODONE HYDROCHLORIDE AND ACETAMINOPHEN 1 TABLET: 5; 325 TABLET ORAL at 17:19

## 2020-11-24 ASSESSMENT — PAIN SCALES - GENERAL
PAINLEVEL_OUTOF10: 10
PAINLEVEL_OUTOF10: 10
PAINLEVEL_OUTOF10: 6
PAINLEVEL_OUTOF10: 10
PAINLEVEL_OUTOF10: 10

## 2020-11-24 ASSESSMENT — PAIN DESCRIPTION - PAIN TYPE: TYPE: ACUTE PAIN

## 2020-11-24 ASSESSMENT — PAIN DESCRIPTION - LOCATION: LOCATION: CHEST

## 2020-11-24 NOTE — ED PROVIDER NOTES
[address] ED      CHIEF COMPLAINT  Chest Pain (states he has an infection in his lungs and it is causing burnign in his chest.)       HISTORY OF PRESENT ILLNESS  Mirna Michel is a 37 y.o. male  who presents to the ED complaining of multiple symptoms. The patient initially presents with chest pain. He states it is a burning sensation. Denies it is exertional.  He does describe associated shortness of breath. He has not had a cough. He does not have a history of coronary artery disease. He states that the chest pain started when he was arrested today here. He is here with deputies. He also states that he has back pain. He states he was recently admitted for osteomyelitis, but he got scared to the hospital so he left [address] He was having persistent back pain and was supposed to be taking Zyvox but this interacted poorly with his Wellbutrin, and he did not want to stop taking his Wellbutrin so he contacted his physician who told him he needed to come back to the emergency department for further IV antibiotic treatment of his osteomyelitis. The patient denies bowel incontinence, describes some urinary incontinence. He denies numbness or weakness of his legs. Apparently he had crawled up into an attic earlier today. He denies saddle anesthesia. He also states he has had some nausea, denies vomiting or diarrhea. He also describes testicle pain. Denies dysuria or hematuria. He states he has not used IV drugs since this past January. No other complaints, modifying factors or associated symptoms. I have reviewed the following from the nursing documentation.     Past Medical History:   Diagnosis Date    Back pain     Depression 12/9/2019    GERD (gastroesophageal reflux disease)     Hepatitis C     MRSA (methicillin resistant staph aureus) culture positive 09/25/2020    L5 disc asp    Polysubstance abuse ([facility] [address]     Wears glasses      Past Surgical History: daily      ibuprofen (ADVIL;MOTRIN) 800 MG tablet Take 800 mg by mouth every 6 hours as needed for Pain      pantoprazole (PROTONIX) 40 MG tablet Take 40 mg by mouth daily      OLANZapine (ZYPREXA) 10 MG tablet Take 1 tablet by mouth nightly 30 tablet 0     No Known Allergies    REVIEW OF SYSTEMS  10 systems reviewed, pertinent positives per HPI otherwise noted to be negative. PHYSICAL EXAM  /72   Pulse 67   Temp 97.8 °F (36.6 °C) (Oral)   Resp 10   Ht 5' 9\" (1.753 m)   Wt 155 lb (70.3 kg)   SpO2 98%   BMI 22.89 kg/m²    Physical exam:  General appearance: awake and cooperative. No distress. Non toxic appearing. Skin: Warm and dry. No rashes or lesions. HENT: Normocephalic. Atraumatic. Neck: supple  Eyes: SHA. EOM intact. Heart: RRR. No murmurs. Lungs: Respirations unlabored. CTAB. No wheezes, rales, or rhonchi. Good air exchange  Abdomen: No tenderness. Soft. Non distended. No peritoneal signs. Musculoskeletal: Tenderness to palpation diffusely across lumbar spine. No extremity edema. Compartments soft. No deformity. No tenderness in the extremities. All extremities neurovascularly intact. Radial, Dp, and PT pulses +2/4 bilaterally  Neurological: Alert and oriented. Strength 5 out of 5 in upper and lower extremities bilaterally. Patellar reflexes 2 out of 4 bilaterally. No focal deficits. No aphasia or dysarthria. Psychiatric: Normal mood and affect. LABS  I have reviewed all labs for this visit.    Results for orders placed or performed during the hospital encounter of 11/24/20   CBC Auto Differential   Result Value Ref Range    WBC 17.9 (H) 4.0 - 11.0 K/uL    RBC 4.25 4.20 - 5.90 M/uL    Hemoglobin 12.3 (L) 13.5 - 17.5 g/dL    Hematocrit 37.1 (L) 40.5 - 52.5 %    MCV 87.3 80.0 - 100.0 fL    MCH 28.9 26.0 - 34.0 pg    MCHC 33.1 31.0 - 36.0 g/dL    RDW 15.0 12.4 - 15.4 %    Platelets 189 110 - 297 K/uL    MPV 7.6 5.0 - 10.5 fL    Neutrophils % 78.4 %    Lymphocytes % 13.9 for Rejection see below        ECG  The Ekg interpreted by me shows  normal sinus rhythm with a rate of 95  Axis is   Normal  QTc is  within an acceptable range  Intervals and Durations are unremarkable. RADIOLOGY  Mri Lumbar Spine W Wo Contrast    Result Date: 11/24/2020  EXAMINATION: MRI OF THE LUMBAR SPINE WITHOUT AND WITH CONTRAST  11/24/2020 8:10 pm TECHNIQUE: Multiplanar multisequence MRI of the lumbar spine was performed without and with the administration of intravenous contrast. COMPARISON: None. HISTORY: ORDERING SYSTEM PROVIDED HISTORY: discitis/osteomyelitis TECHNOLOGIST PROVIDED HISTORY: Reason for exam:->discitis/osteomyelitis Reason for Exam: hx of discitis that has gone untreated, pain no worsening and radiating up danii in the back Acuity: Chronic Type of Exam: Ongoing FINDINGS: BONES/ALIGNMENT: Abnormal appearance of the L5 and S1 levels. There is intervertebral disc space narrowing with erosive endplate changes and small intervertebral fluid collection. Diffuse stir hyperintense signal is noted throughout the L5 and S1 vertebral bodies indicating marrow edema. On the postcontrast images, there is enhancement of the intervertebral as well as enhancement of the epidural margin posterior to the L5-S1 levels and patchy enhancement of the L5 and S1 vertebral bodies. The findings are compatible with discitis/osteomyelitis with epidural enhancement and possible small epidural abscess. There is otherwise normal alignment of the lumbar spine. The vertebral body heights are maintained. The remainder of the bone marrow signal appears unremarkable. SPINAL CORD:  The conus terminates normally. SOFT TISSUES: No abnormal enhancement is seen of the lumbar spine. No paraspinal mass identified. L1-L2: There is no significant disc protrusion, spinal canal stenosis or neural foraminal narrowing. L2-L3: There is no significant disc protrusion, spinal canal stenosis or neural foraminal narrowing.  L3-L4: There is a minimal central disc bulge resulting in minimal narrowing of the spinal canal at this level. L4-L5: There is a mild-to-moderate broad-based disc bulge which is eccentric to the left and is resulting in mild narrowing of the bilateral lateral recesses at this level. L5-S1: There is no significant spinal canal nor neural foraminal stenosis. Abnormal appearance of the L5 and S1 levels. There is intervertebral disc space narrowing with erosive endplate changes and small intervertebral fluid collection. Diffuse signal abnormality is noted throughout the L5 and S1 vertebral bodies indicating marrow edema. On the postcontrast images, there is enhancement of the intervertebral space as well as enhancement of the epidural margin posterior to the L5-S1 levels and patchy enhancement of the L5 and S1 vertebral bodies. The findings are compatible with discitis/osteomyelitis with a suspect small epidural abscess. Us Scrotum And Testicles    Result Date: 11/24/2020  EXAMINATION: ULTRASOUND OF THE SCROTUM/TESTICLES WITH COLOR DOPPLER FLOW EVALUATION 11/24/2020 COMPARISON: None. HISTORY: ORDERING SYSTEM PROVIDED HISTORY: testicle pain TECHNOLOGIST PROVIDED HISTORY: Reason for exam:->testicle pain FINDINGS: Measurements: Right testicle: 5.0 x 3.1 x 2.3 cm Left testicle: 4.9 x 2.4 x 2.6 cm Right: Grey scale: The right testicle demonstrates normal homogeneous echotexture without focal lesion. No evidence of testicular microlithiasis. Doppler Evaluation:  There is normal arterial and venous Doppler flow within the testicle. Scrotal Sac:  No evidence of hydrocele. Epididymis:  No acute abnormality. Left: Grey scale: The left testicle demonstrates normal homogeneous echotexture without focal lesion. No evidence of testicular microlithiasis. Doppler Evaluation:  There is normal arterial and venous Doppler flow within the testicle. Scrotal Sac:  No evidence of hydrocele. Epididymis:  No acute abnormality. Unremarkable testicular ultrasound with normal Doppler flow. Xr Chest Portable    Result Date: 11/24/2020  EXAMINATION: ONE XRAY VIEW OF THE CHEST 11/24/2020 3:44 pm COMPARISON: 12/06/2019 HISTORY: ORDERING SYSTEM PROVIDED HISTORY: cough TECHNOLOGIST PROVIDED HISTORY: Reason for exam:->cough Reason for Exam: Chest Pain (states he has an infection in his lungs and it is causing burnign in his chest.) FINDINGS: The cardiomediastinal silhouette is within normal limits. There is no consolidation, pneumothorax or evidence for edema. No evidence for effusion. No acute osseous abnormality is identified. No acute airspace disease identified. ED COURSE/MDM  Patient seen and evaluated. Old records reviewed. Labs and imaging reviewed and results discussed with patient. The patient is a 42-year-old male presenting with chest pain initially however he has many additional complaints. His vital signs are within normal limits, mildly tachycardic initially but this did resolve. his EKG is nonischemic. Troponin is negative. I do not suspect the chest pain is cardiac in nature. Chest x-ray looks clear and I do not suspect infectious cause, Covid test is negative. He describes back pain with a known history of osteomyelitis and discitis that has been untreated since he left AGAINST MEDICAL ADVICE from [place]. He has not been taking Zyvox as prescribed due to interaction with Wellbutrin. He does have a leukocytosis. He also has an elevated ESR. He is neurologically intact, I do not suspect cauda equina or acute spinal cord compression at this time however he will require MRI for evaluation of his back pain since he likely has had worsening disease not being on antibiotics. MRI was ordered, and it was performed and does show evidence of osteomyelitis and discitis with a small epidural abscess. Patient was given vancomycin and cefepime.   He also described testicular pain that has been ongoing for quite

## 2020-11-25 ENCOUNTER — HOSPITAL ENCOUNTER (INPATIENT)
Age: 40
LOS: 3 days | Discharge: NURSING, REHAB OR LONG-TERM CARE | DRG: 720 | End: 2020-11-28
Attending: INTERNAL MEDICINE | Admitting: INTERNAL MEDICINE
Payer: COMMERCIAL

## 2020-11-25 ENCOUNTER — APPOINTMENT (OUTPATIENT)
Dept: INTERVENTIONAL RADIOLOGY/VASCULAR | Age: 40
DRG: 720 | End: 2020-11-25
Attending: INTERNAL MEDICINE
Payer: COMMERCIAL

## 2020-11-25 VITALS
WEIGHT: 155 LBS | BODY MASS INDEX: 22.96 KG/M2 | TEMPERATURE: 98.1 F | DIASTOLIC BLOOD PRESSURE: 85 MMHG | OXYGEN SATURATION: 99 % | HEART RATE: 82 BPM | SYSTOLIC BLOOD PRESSURE: 126 MMHG | RESPIRATION RATE: 14 BRPM | HEIGHT: 69 IN

## 2020-11-25 PROBLEM — M46.27 OSTEOMYELITIS OF VERTEBRA, LUMBOSACRAL REGION (HCC): Status: ACTIVE | Noted: 2020-09-23

## 2020-11-25 PROBLEM — M46.47 DISCITIS OF LUMBOSACRAL REGION: Status: ACTIVE | Noted: 2020-11-25

## 2020-11-25 PROBLEM — E44.1 MILD MALNUTRITION (HCC): Chronic | Status: ACTIVE | Noted: 2020-11-25

## 2020-11-25 LAB
INR BLD: 0.97 (ref 0.86–1.14)
PROTHROMBIN TIME: 11.3 SEC (ref 10–13.2)

## 2020-11-25 PROCEDURE — 6370000000 HC RX 637 (ALT 250 FOR IP): Performed by: INTERNAL MEDICINE

## 2020-11-25 PROCEDURE — 87070 CULTURE OTHR SPECIMN AEROBIC: CPT

## 2020-11-25 PROCEDURE — 2580000003 HC RX 258: Performed by: INTERNAL MEDICINE

## 2020-11-25 PROCEDURE — 85610 PROTHROMBIN TIME: CPT

## 2020-11-25 PROCEDURE — 99255 IP/OBS CONSLTJ NEW/EST HI 80: CPT | Performed by: INTERNAL MEDICINE

## 2020-11-25 PROCEDURE — 0S943ZX DRAINAGE OF LUMBOSACRAL DISC, PERCUTANEOUS APPROACH, DIAGNOSTIC: ICD-10-PCS | Performed by: RADIOLOGY

## 2020-11-25 PROCEDURE — 87205 SMEAR GRAM STAIN: CPT

## 2020-11-25 PROCEDURE — 77003 FLUOROGUIDE FOR SPINE INJECT: CPT

## 2020-11-25 PROCEDURE — 36415 COLL VENOUS BLD VENIPUNCTURE: CPT

## 2020-11-25 PROCEDURE — 62267 INTERDISCAL PERQ ASPIR DX: CPT

## 2020-11-25 PROCEDURE — 6360000002 HC RX W HCPCS: Performed by: INTERNAL MEDICINE

## 2020-11-25 PROCEDURE — 1200000000 HC SEMI PRIVATE

## 2020-11-25 RX ORDER — FOLIC ACID 1 MG/1
1 TABLET ORAL DAILY
Status: DISCONTINUED | OUTPATIENT
Start: 2020-11-25 | End: 2020-11-28 | Stop reason: HOSPADM

## 2020-11-25 RX ORDER — GABAPENTIN 600 MG/1
600 TABLET ORAL 3 TIMES DAILY
COMMUNITY

## 2020-11-25 RX ORDER — SODIUM CHLORIDE 0.9 % (FLUSH) 0.9 %
10 SYRINGE (ML) INJECTION EVERY 12 HOURS SCHEDULED
Status: DISCONTINUED | OUTPATIENT
Start: 2020-11-25 | End: 2020-11-28 | Stop reason: HOSPADM

## 2020-11-25 RX ORDER — FOLIC ACID 1 MG/1
1 TABLET ORAL DAILY
COMMUNITY
End: 2021-11-28

## 2020-11-25 RX ORDER — ACETAMINOPHEN 650 MG/1
650 SUPPOSITORY RECTAL EVERY 6 HOURS PRN
Status: DISCONTINUED | OUTPATIENT
Start: 2020-11-25 | End: 2020-11-28 | Stop reason: HOSPADM

## 2020-11-25 RX ORDER — OLANZAPINE 5 MG/1
10 TABLET ORAL NIGHTLY
Status: DISCONTINUED | OUTPATIENT
Start: 2020-11-25 | End: 2020-11-28 | Stop reason: HOSPADM

## 2020-11-25 RX ORDER — BUSPIRONE HYDROCHLORIDE 15 MG/1
15 TABLET ORAL 3 TIMES DAILY PRN
COMMUNITY

## 2020-11-25 RX ORDER — QUETIAPINE FUMARATE 100 MG/1
100 TABLET, FILM COATED ORAL NIGHTLY
Status: ON HOLD | COMMUNITY
End: 2020-11-28 | Stop reason: HOSPADM

## 2020-11-25 RX ORDER — SODIUM CHLORIDE 0.9 % (FLUSH) 0.9 %
10 SYRINGE (ML) INJECTION PRN
Status: DISCONTINUED | OUTPATIENT
Start: 2020-11-25 | End: 2020-11-28 | Stop reason: HOSPADM

## 2020-11-25 RX ORDER — QUETIAPINE FUMARATE 25 MG/1
100 TABLET, FILM COATED ORAL NIGHTLY
Status: DISCONTINUED | OUTPATIENT
Start: 2020-11-25 | End: 2020-11-28 | Stop reason: HOSPADM

## 2020-11-25 RX ORDER — PANTOPRAZOLE SODIUM 40 MG/1
40 TABLET, DELAYED RELEASE ORAL DAILY
Status: DISCONTINUED | OUTPATIENT
Start: 2020-11-25 | End: 2020-11-28 | Stop reason: HOSPADM

## 2020-11-25 RX ORDER — MORPHINE SULFATE 2 MG/ML
2 INJECTION, SOLUTION INTRAMUSCULAR; INTRAVENOUS
Status: COMPLETED | OUTPATIENT
Start: 2020-11-25 | End: 2020-11-25

## 2020-11-25 RX ORDER — ACETAMINOPHEN 325 MG/1
650 TABLET ORAL EVERY 6 HOURS PRN
Status: DISCONTINUED | OUTPATIENT
Start: 2020-11-25 | End: 2020-11-28 | Stop reason: HOSPADM

## 2020-11-25 RX ORDER — SULFAMETHOXAZOLE AND TRIMETHOPRIM 800; 160 MG/1; MG/1
1 TABLET ORAL EVERY 12 HOURS
Status: ON HOLD | COMMUNITY
End: 2020-11-28 | Stop reason: HOSPADM

## 2020-11-25 RX ORDER — LORAZEPAM 2 MG/ML
2 INJECTION INTRAMUSCULAR EVERY 6 HOURS PRN
Status: DISPENSED | OUTPATIENT
Start: 2020-11-25 | End: 2020-11-26

## 2020-11-25 RX ORDER — BUPROPION HYDROCHLORIDE 100 MG/1
100 TABLET, EXTENDED RELEASE ORAL 2 TIMES DAILY
Status: DISCONTINUED | OUTPATIENT
Start: 2020-11-25 | End: 2020-11-28 | Stop reason: HOSPADM

## 2020-11-25 RX ORDER — PROMETHAZINE HYDROCHLORIDE 12.5 MG/1
12.5 TABLET ORAL EVERY 6 HOURS PRN
Status: DISCONTINUED | OUTPATIENT
Start: 2020-11-25 | End: 2020-11-27

## 2020-11-25 RX ORDER — GABAPENTIN 300 MG/1
300 CAPSULE ORAL 3 TIMES DAILY
Status: DISCONTINUED | OUTPATIENT
Start: 2020-11-25 | End: 2020-11-28 | Stop reason: HOSPADM

## 2020-11-25 RX ORDER — LINEZOLID 600 MG/1
600 TABLET, FILM COATED ORAL 2 TIMES DAILY
Status: ON HOLD | COMMUNITY
End: 2020-11-28 | Stop reason: HOSPADM

## 2020-11-25 RX ORDER — ONDANSETRON 2 MG/ML
4 INJECTION INTRAMUSCULAR; INTRAVENOUS EVERY 6 HOURS PRN
Status: DISCONTINUED | OUTPATIENT
Start: 2020-11-25 | End: 2020-11-28 | Stop reason: HOSPADM

## 2020-11-25 RX ORDER — POLYETHYLENE GLYCOL 3350 17 G/17G
17 POWDER, FOR SOLUTION ORAL DAILY PRN
Status: DISCONTINUED | OUTPATIENT
Start: 2020-11-25 | End: 2020-11-28 | Stop reason: HOSPADM

## 2020-11-25 RX ADMIN — VANCOMYCIN HYDROCHLORIDE 1500 MG: 10 INJECTION, POWDER, LYOPHILIZED, FOR SOLUTION INTRAVENOUS at 19:17

## 2020-11-25 RX ADMIN — VANCOMYCIN HYDROCHLORIDE 1000 MG: 10 INJECTION, POWDER, LYOPHILIZED, FOR SOLUTION INTRAVENOUS at 08:20

## 2020-11-25 RX ADMIN — LORAZEPAM 2 MG: 2 INJECTION INTRAMUSCULAR; INTRAVENOUS at 09:45

## 2020-11-25 RX ADMIN — CEFEPIME 2 G: 2 INJECTION, POWDER, FOR SOLUTION INTRAMUSCULAR; INTRAVENOUS at 07:34

## 2020-11-25 RX ADMIN — MORPHINE SULFATE 2 MG: 2 INJECTION, SOLUTION INTRAMUSCULAR; INTRAVENOUS at 08:21

## 2020-11-25 RX ADMIN — MORPHINE SULFATE 2 MG: 2 INJECTION, SOLUTION INTRAMUSCULAR; INTRAVENOUS at 16:25

## 2020-11-25 RX ADMIN — GABAPENTIN 300 MG: 300 CAPSULE ORAL at 09:45

## 2020-11-25 RX ADMIN — Medication 10 ML: at 23:43

## 2020-11-25 RX ADMIN — CEFEPIME 2 G: 2 INJECTION, POWDER, FOR SOLUTION INTRAMUSCULAR; INTRAVENOUS at 18:31

## 2020-11-25 RX ADMIN — GABAPENTIN 300 MG: 300 CAPSULE ORAL at 16:31

## 2020-11-25 RX ADMIN — FOLIC ACID 1 MG: 1 TABLET ORAL at 09:45

## 2020-11-25 RX ADMIN — GABAPENTIN 300 MG: 300 CAPSULE ORAL at 23:43

## 2020-11-25 RX ADMIN — OLANZAPINE 10 MG: 5 TABLET, FILM COATED ORAL at 23:43

## 2020-11-25 RX ADMIN — BUPROPION HYDROCHLORIDE 100 MG: 100 TABLET, FILM COATED, EXTENDED RELEASE ORAL at 16:25

## 2020-11-25 RX ADMIN — ENOXAPARIN SODIUM 40 MG: 40 INJECTION SUBCUTANEOUS at 09:45

## 2020-11-25 RX ADMIN — LORAZEPAM 2 MG: 2 INJECTION INTRAMUSCULAR; INTRAVENOUS at 18:27

## 2020-11-25 RX ADMIN — Medication 10 ML: at 09:46

## 2020-11-25 RX ADMIN — QUETIAPINE FUMARATE 100 MG: 25 TABLET ORAL at 23:43

## 2020-11-25 RX ADMIN — PANTOPRAZOLE SODIUM 40 MG: 40 TABLET, DELAYED RELEASE ORAL at 09:50

## 2020-11-25 ASSESSMENT — PAIN DESCRIPTION - PROGRESSION
CLINICAL_PROGRESSION: NOT CHANGED

## 2020-11-25 ASSESSMENT — PAIN DESCRIPTION - ORIENTATION
ORIENTATION: LOWER

## 2020-11-25 ASSESSMENT — PAIN SCALES - GENERAL
PAINLEVEL_OUTOF10: 0
PAINLEVEL_OUTOF10: 0
PAINLEVEL_OUTOF10: 3
PAINLEVEL_OUTOF10: 8
PAINLEVEL_OUTOF10: 0
PAINLEVEL_OUTOF10: 8
PAINLEVEL_OUTOF10: 0
PAINLEVEL_OUTOF10: 7

## 2020-11-25 ASSESSMENT — PAIN DESCRIPTION - DESCRIPTORS
DESCRIPTORS: SHARP
DESCRIPTORS: SHARP

## 2020-11-25 ASSESSMENT — PAIN DESCRIPTION - PAIN TYPE
TYPE: ACUTE PAIN

## 2020-11-25 ASSESSMENT — PAIN - FUNCTIONAL ASSESSMENT
PAIN_FUNCTIONAL_ASSESSMENT: PREVENTS OR INTERFERES SOME ACTIVE ACTIVITIES AND ADLS
PAIN_FUNCTIONAL_ASSESSMENT: PREVENTS OR INTERFERES SOME ACTIVE ACTIVITIES AND ADLS

## 2020-11-25 ASSESSMENT — PAIN DESCRIPTION - LOCATION
LOCATION: BACK

## 2020-11-25 ASSESSMENT — PAIN DESCRIPTION - FREQUENCY
FREQUENCY: CONTINUOUS
FREQUENCY: CONTINUOUS

## 2020-11-25 ASSESSMENT — PAIN DESCRIPTION - ONSET
ONSET: ON-GOING
ONSET: ON-GOING

## 2020-11-25 NOTE — PROGRESS NOTES
4 Eyes Admission Assessment     I agree as the admission nurse that 2 RN's have performed a thorough Head to Toe Skin Assessment on the patient. ALL assessment sites listed below have been assessed on admission. Areas assessed by both nurses:   [x]   Head, Face, and Ears   [x]   Shoulders, Back, and Chest  [x]   Arms, Elbows, and Hands   [x]   Coccyx, Sacrum, and Ischium  [x]   Legs, Feet, and Heels        Does the Patient have Skin Breakdown?   Yes a wound was noted on the Admission Assessment and an LDA was Initiated documentation include the Moraima-wound, Wound Assessment, Measurements, Dressing Treatment, Drainage, and Color\",         Hua Prevention initiated:  Yes   Wound Care Orders initiated:  Yes      [address] nurse consulted for Pressure Injury (Stage 3,4, Unstageable, DTI, NWPT, and Complex wounds) or Hua score 18 or lower:  No      Nurse 1 eSignature: Electronically signed by Kimberley Mays RN on 11/25/20 at 6:31 AM EST    **SHARE this note so that the co-signing nurse is able to place an eSignature**    Nurse 2 eSignature: Electronically signed by Brayden Thorpe RN on 11/25/20 at 6:51 AM EST

## 2020-11-25 NOTE — CARE COORDINATION
CM attempted to see pt  To complete  Initial assessment but he had received  Ativan and not able to participate in conversation at this time . Per  AMAIRANI [place] pt is Alert and Oriented  :  Police  Have left  The door and out of custody at his time. CM michelle return later Time permitting and pt awake. Pt has  Hx of IVDU: and has discitis/osteomyelitis with a suspected small epidural abscess    Neurosurgery Consulted as well as  ID : Cont to follow for Rec;   pt may need  IV atbx  Long term and would require a PICC or MID line placemenmt :  And  Supervised  Setting In a SNF  D/T Drug use. Or  D/c with PO atbx . Pt has  [employer]  , requires  Pre cert  But are  currently waiving pre cert  At this time d/t pandemic. Cont to follow . Electronically signed by Jacquelyn Willams RN on 11/25/2020 at 3:26 PM         Jacquelyn Willams RN Case Manager  [facility] [employer]  [address] [place] [address].   [place] [address]  [phone]  Fax [phone]

## 2020-11-25 NOTE — PLAN OF CARE
Nutrition Problem #1: Inadequate oral intake  Intervention: Food and/or Nutrient Delivery: Continue Current Diet, Modify Oral Nutrition Supplement  Nutritional Goals: Pt will meet >75% of nutritional needs this admission

## 2020-11-25 NOTE — PROGRESS NOTES
Patient returned from cath lab. Patient is resting in bed with eyes closed.   He denies any pain at this time

## 2020-11-25 NOTE — ED NOTES
Report given to [facility] RN at [facility], [facility]  11/25/20 [id number]

## 2020-11-25 NOTE — PLAN OF CARE
Problem: Pain:  Goal: Control of acute pain  Description: Control of acute pain  Outcome: Ongoing   Patient complained of pain this morning and was medicated with Morphine with relief

## 2020-11-25 NOTE — PROGRESS NOTES
Patient is very anxious with visible tremors. Patient talking in circles. Patient can not sit still. Patient given Ativan for anxiety.   Sent message to pharmacy for Wellbutrin

## 2020-11-25 NOTE — CONSULTS
Infectious Diseases Inpatient Consult Note    Medical Student note - reviewed and modified, see Attending addendum at bottom    Reason for Consult:   IVDU, discitis/epidural abscess  Requesting Physician:   Marlene Choi  Primary Care Physician:  JORDY Cabrera CNP  History Obtained From:   Pt, EPIC    Admit Date: 11/25/2020  Hospital Day: 1    CHIEF COMPLAINT:     Lower back pain    HISTORY OF PRESENT ILLNESS:    Lan Rodgers is a 37 y.o. male with PMHx of ongoing IV Drug abuse ,CAD,Hep C ,MRSA arm abscess who presents as a transfer from [facility]. 9/23-9/26  Admitted at [facility] for Lumbosacral discitis/osteomyelitis with radiculopathy L5-S1. Possible phlegmon noted but no overt signs of cord compression. CT guided disc aspiration by IR. Terminated by PT 2/2 to pain. Left AMA  10/8-10/14  Admitted to [address] [place] [address] for discitis. Disc biopsy negative  In house Vanc/cefepime  Discharged on Linezolid x4 week supply     11/24  Back pain worsened, radiating cephalad. Pt called ID-Dr. Devonte Almeida who recommended pt stop linezolid (lack of efficacy and concerning serotonin syndrome while on Wellbutrin). Recommended that pt be admitted through ED for restaging of infection via MRI and 6-8 weeks of IV abx. ED - Patient presented to the ED for burning sensation chest pain with associated shortness of breath. This started when he was arrested on 11/24. Pt was afebrile in the ED. He also complains of persistent back painHe complains of some urinary incontinence, but denies bowl incontinence, numbness or weakness of his legs, or saddle anesthesia. MRI Lumbosacral spine - discitis/osteomyelitis with suspect small epidural abscess    11/25  IR PERC ASPIRATION INTERVERT DISC   - L5-S1 Disc Aspiration  Pt started on vancomycin and cefepime  Patient was asleep when entered room.  He stated that he is very tired but has a lot of back pain, ranking it as a 7/10 that is constant and dull, radiating down both of his legs. He also complains of some sharp pain in his low back that comes and goes. Denies any other pain. Past Medical History:    Past Medical History:   Diagnosis Date    Back pain     Depression 12/9/2019    GERD (gastroesophageal reflux disease)     Hepatitis C     MRSA (methicillin resistant staph aureus) culture positive 09/25/2020    L5 disc asp    Polysubstance abuse ([facility])     Wears glasses        Past Surgical History:    Past Surgical History:   Procedure Laterality Date    APPENDECTOMY      IR PERC ASPIRATION INTERVERT DISC  11/25/2020    IR PERC ASPIRATION INTERVERT [address] 11/25/2020 [facility] SPECIAL PROCEDURES    SHOULDER SURGERY Left 4/1/14    UPPER GASTROINTESTINAL ENDOSCOPY  2014       Current Medications:     influenza virus vaccine  0.5 mL Intramuscular Prior to discharge    buPROPion  100 mg Oral BID    folic acid  1 mg Oral Daily    gabapentin  300 mg Oral TID    OLANZapine  10 mg Oral Nightly    pantoprazole  40 mg Oral Daily    QUEtiapine  100 mg Oral Nightly    sodium chloride flush  10 mL Intravenous 2 times per day    enoxaparin  40 mg Subcutaneous Daily    cefepime  2 g Intravenous Q12H    vancomycin  1,500 mg Intravenous Q12H       Allergies:  Patient has no known allergies. Social History:    TOBACCO:    1.00 pack per day  ETOH:    None  DRUGS:   10/11 injected heroin in bathroom at [address] Urine Tox positive for amphetamine (11/25)  MARITAL STATUS:   Unknown  OCCUPATION:   Unknown    Patient lives Unknown    Family History:   No immunodeficiency    REVIEW OF SYSTEMS:    No fever / chills / sweats. No weight loss. No visual change, eye pain, eye discharge. No oral lesion, sore throat, dysphagia. Denies cough / sputum. Denies chest pain, palpitations. Denies n / v / abd pain. No diarrhea. Denies dysuria or change in urinary function. Denies joint swelling or pain.   No myalgia, arthralgia. + Low back pain that radiates down BL LE  Denies  Itching. Complains of sores all over his arms and legs that \"don't heal\". States that this started 3 months ago. Denies focal weakness, sensory change +occsional urinary incontinence  Denies new / worse depression, psychiatric symptoms    PHYSICAL EXAM:      Vitals:    /74   Pulse 76   Temp 98.2 °F (36.8 °C) (Oral)   Resp 18   Ht 5' 9\" (1.753 m)   Wt 141 lb 15.6 oz (64.4 kg)   SpO2 99%   BMI 20.97 kg/m²     GENERAL: No apparent distress. HEENT: Membranes moist, no oral lesion, PERRL  NECK:  Supple, no lymphadenopathy  LUNGS: Clear b/l, no rales, no dullness  CARDIAC: RRR, no murmur appreciated  ABD:  + BS, soft / NT  EXT:  No rash, no edema, Multiple BUE and BLE ulcers + effusion lateral to the right of L5  NEURO: Decreased sensation LLE L5 distribution. Decreased sensation RLE L3 distribution. Negative babinski BL.  PSYCH: Orientation, sensorium, mood normal  LINES:  Peripheral iv    DATA:    Lab Results   Component Value Date    WBC 17.9 (H) 2020    HGB 12.3 (L) 2020    HCT 37.1 (L) 2020    MCV 87.3 2020     2020     Lab Results   Component Value Date    CREATININE 1.2 2020    BUN 20 2020     (L) 2020    K 4.2 2020     2020    CO2 23 2020       Hepatic Function Panel:   Lab Results   Component Value Date    ALKPHOS 103 2020    ALT 39 2020    AST 38 2020    PROT 7.5 2020    PROT 8.8 2011    BILITOT 0.3 2020    LABALBU 4.0 2020     WBC 17.9. Sed rate 19  Micro:    Blood Culture 1 - Rejected Unable to perform testing due to the age of the specimen. To perform testing the specimen will need to be recollected. Blood Culture 2 - Rejected Unable to perform testing due to the age of the specimen. To perform testing the specimen will need to be recollected. COVID-19 - Not detected  Imagin/24  Xray Portable   - No acute airspace disease identified. MRI Lumbar Spine W WO contrast  Abnormal appearance of the L5 and S1 levels. There is intervertebral disc   space narrowing with erosive endplate changes and small intervertebral fluid   collection. Diffuse signal abnormality is noted throughout the L5 and S1   vertebral bodies indicating marrow edema. On the postcontrast images, there   is enhancement of the intervertebral space as well as enhancement of the   epidural margin posterior to the L5-S1 levels and patchy enhancement of the   L5 and S1 vertebral bodies. The findings are compatible with   discitis/osteomyelitis with a suspect small epidural abscess. Ultrasound Scrotum and Testicles  - Unremarkable testicular ultrasound with normal Doppler flow. 11/25  IR PERC ASPIRATION INTERVERT DISC   - L5-S1 disc aspiration      IMPRESSION:      Patient Active Problem List   Diagnosis    Psychoactive substance-induced organic mood disorder ([address]    Ferguson's esophagus with dysplasia    Gastroesophageal reflux disease without esophagitis    Hepatitis C virus infection without hepatic coma    Acute respiratory failure ([address]    IVDU (intravenous drug user)    Acute metabolic encephalopathy    Unresponsive    Polysubstance abuse ([address]    Intentional drug overdose ([address]    Disorder of electrolytes    Leukocytosis    Depression    Osteomyelitis of lumbar spine (HCC)    Tobacco abuse    Epidural abscess    Mild malnutrition (HCC)       Lumbosacral discitis/osteomyelitis with radiculopathy L5-S1. RECOMMENDATIONS:    Continue Vancomycin for Staphylococcus aureus coverage, and cefepime for pseudomonas aeruginosa coverage. Change if necessary based on blood cultures. Discussed with patient  Christina President, MS ALVAREZ    Addendum to Medical Student Consult note:  Pt seen,examined and evaluated. I have independently performed history, physical exam, lab and data review. I have determined assessment and plan as documented by student Seth Choudhary).     38 yo man

## 2020-11-25 NOTE — CONSULTS
MONITORING & EVALUATION:  Evaluation:Goals set   Goals:Goals: Pt will meet >75% of nutritional needs this admission  Monitoring: Meal Intake , Supplement Intake  or Weight      OBJECTIVE DATA:  · Nutrition-Focused Physical Findings: no edema, BM 11/24  · Wounds None      Past Medical History:   Diagnosis Date    Back pain     Depression 12/9/2019    GERD (gastroesophageal reflux disease)     Hepatitis C     MRSA (methicillin resistant staph aureus) culture positive 09/25/2020    L5 disc asp    Polysubstance abuse ([address]     Wears glasses         ANTHROPOMETRICS  Current Height: 5' 9\" (175.3 cm)  Current Weight: 141 lb 15.6 oz (64.4 kg)    Admission weight: 141 lb 15.6 oz (64.4 kg)  Ideal Bodyweight 160 lb    Weight Changes -7%, 10 lb x 1 yr       BMI BMI (Calculated): 21    Wt Readings from Last 50 Encounters:   11/25/20 141 lb 15.6 oz (64.4 kg)   11/24/20 155 lb (70.3 kg)   10/29/20 150 lb (68 kg)   09/23/20 150 lb (68 kg)   12/06/19 151 lb 14.4 oz (68.9 kg)     COMPARATIVE STANDARDS  Estimated Total Kcals/Day : 28-34 Current Bodyweight (64 kg) 1831-3181 kcal    Estimated Total Protein (g/day) : 1.3-1.5 Current Bodyweight (64 kg) 83-96 g/day  Estimated Daily Total Fluid (ml/day): 1 mL/kcal per day     Food / Nutrition-Related History  Pre-Admission / Home Diet:  Pre-Admission/Home Diet: General   Home Supplements / Herbals:    none noted  Food Restrictions / Cultural Requests:    none noted    Current Nutrition Therapies   DIET GENERAL;  Dietary Nutrition Supplements: Clear Liquid Oral Supplement     PO Intake: just admitted. no meals yet   PO Supplement: Clear Liquid Supplement    PO Supplement Intake: ANJEL  IVF: none      NUTRITION RISK LEVEL: Risk Level:  Moderate     Erin Mac LD  [place]:  [phone]  Office:  [phone]

## 2020-11-25 NOTE — CONSULTS
(H) 11/24/2020    HGB 12.3 (L) 11/24/2020    HCT 37.1 (L) 11/24/2020    MCV 87.3 11/24/2020     11/24/2020       Lab Results   Component Value Date    PROTIME 11.6 09/23/2020    INR 1.00 09/23/2020       Height:  5' 9\" (175.3 cm)  Weight:  141 lb 15.6 oz (64.4 kg)    Culture results:  SARS-CoV-2 (11/24) = negative  Blood (11/24) = sent    Prophylaxis:  VTE:  Enoxaparin  GI:  Not indicated      Assessment/Plan:  1)  L5-S1 discitis / osteomyelitis:  Cefepime + Vancomycin - day #2  · Cefepime -   Current dose remains appropriate based on indication and current renal function. Will continue to monitor and adjust as needed per [facility] Renal Dose Adjustment Policy. · Vancomycin - Pharmacy to dose  · Based on Vanc dosing at [facility] last month, will increase dose to 1.5g IV q12h. · Clinical condition will be monitored closely, and levels will be ordered as clinically indicated (likely with dose 11/27 AM). · Based on prior lumbar bx culture growing MRSE, may be able to discontinue Cefepime. ID is consulted - defer abx decisions to them.     Please call with questions--  Thanks--  Pamela Olivares, PharmD, [address] [facility]  [phone] or [id number] ([facility])   11/25/2020 10:18 AM

## 2020-11-25 NOTE — FLOWSHEET NOTE
Spiritual Care Note  Referral for spiritual care due to emotional distress. At time of this visit, pt sleeping; I do not want to disturb him, so will return later this afternoon.    11/25/2020  Chaplain Ram Shown Susanna [facility]     11/25/20 1340   Encounter Summary   Services provided to: Patient not available  (pt asleep at time of visit)   Referral/Consult From: Patient;Nurse   Continue Visiting   (LO 11/25)

## 2020-11-25 NOTE — FLOWSHEET NOTE
Spiritual Care Note  Follow up visit to offer pt emotional support; at this time pt still sleeping. Will continue to follow as able.   11/25/2020  Chaplain Sabi Mullins [facility] [place] [facility]     11/25/20 1663   Encounter Summary   Services provided to: Patient not available  (pt is still resting and not avail; will continue to follow)   Continue Visiting   ( 11/25)

## 2020-11-25 NOTE — PLAN OF CARE
D; transferred from [facility] per squad per stretcher with police  at bedside around 9119. Oriented to room, call light, fall protocol, POC, and consults. C/o numbness and tingling to LLE with noted weakness to LUE & LLE. Stated having issues with incontinence at times, so condom catheter applied. Stated has hx of MRSA skin infection, but has scattered abrasion areas scattered on arms, legs, & back. Stated has been noncompliant with taking \"Psych meds & antibiotics\" and was diagnosed with MRSA back in April.    A: Cont to monitor during hourly rounds    Problem: Skin Integrity:  Goal: Will show no infection signs and symptoms  Description: Will show no infection signs and symptoms  Outcome: Ongoing  Goal: Absence of new skin breakdown  Description: Absence of new skin breakdown  Outcome: Ongoing     Problem: Infection - Methicillin-Resistant Staphylococcus Aureus Infection:  Goal: Absence of methicillin-resistant Staphylococcus aureus infection  Description: Absence of methicillin-resistant Staphylococcus aureus infection  Outcome: Ongoing

## 2020-11-25 NOTE — PLAN OF CARE
38 yo male with LBP,  Was being treated for discitis but recently left [facility] . AVSS  Neuro intact  MRI LS spine with osteomyelitis/Discitis L5S1  A/P L5S1 Discitis /osteomyelitis - No significant epidural abscess. No surgical indication. Recommend ID consult and IV antibiotics.

## 2020-11-25 NOTE — H&P
Hospital Medicine      History & Physical        Reason for admission/ Chief Complaint:Multiple    History Obtained From:  Patient,ems    HISTORY OF PRESENT ILLNESS:    The patient is a 37 y.o. male,ongoing IV Drug abuse ,CAD,Hep C ,MRSA arm abscess who presents as a transfer from [facility] this am,with multiple symptoms. - chest pain-burning sensation,non exertional,associated shortness of breath,no cough. - chest pain started when he was arrested today -presented with multiple deputies  -back pain with recent diagnosis of  Osteomyelitis-   No  bowel incontinence, describes some urinary incontinence no saddle anesthesia.   -No numbness or weakness of his legs    Was admitted 9/23-9/26 at [facility], diagnosed  with Lumbosacral discitis / osteomyelitis with radiculopathy: involving L5-S1 with possible phlegmon noted but no overt signs of cord compression. CT guided disc  aspiration by IR which pt terminated sec to pain and decided to sign out AMA      -pt was again  admitted to [facility] 10/8-10/14/20 with discitis, had disc biopsy that was negative   -was given Rx linezolid x4 week supply  after inhouse Vanc/cefepime   but his back pain  worsened,radiating up the back   No fever  -Called ID-Dr Ivet Servin who had seen him at [facility], and she recommended that he stop linezolid for lack of efficacy and concern serotonin syndrome on Welbutrin    and that he needs restaging of the infection via MRI and 6-8 weeks IV abx and was advised admission through ED.  -While at [place] [address] on 10/11 injecting heroin in bathroom.    -at some pt recently,was also given IV Dalvance x2-Unsure if administered    ED Course: ýMRI- LUMBOSACRAL spine -->  discitis/osteomyelitis with a suspect small epidural abscess.    Patient was  Started on vancomycin and cefepime. -Given testicular pain ultrasound  done was  negative for torsion    ? ?  ED Triage Vitals [11/25/20 9916]   Enc Vitals Group      /71      Pulse 69 Resp 28      Temp 98.4 °F (36.9 °C)      Temp Source Oral      SpO2 100 %      Weight 141 lb 15.6 oz (64.4 kg)      Height 5' 9\" (1.753 m)      Head Circumference       Peak Flow       Pain Score       Pain Loc       Pain Edu? Excl. in [address]?       Medications   influenza quadrivalent split vaccine (FLUZONE;FLUARIX;FLULAVAL;AFLURIA) injection 0.5 mL (has no administration in time range)   buPROPion [facility] SR) extended release tablet 100 mg (has no administration in time range)   busPIRone (BUSPAR) tablet 15 mg (has no administration in time range)   folic acid (FOLVITE) tablet 1 mg (has no administration in time range)   gabapentin (NEURONTIN) capsule 300 mg (has no administration in time range)   OLANZapine (ZYPREXA) tablet 10 mg (has no administration in time range)   pantoprazole (PROTONIX) tablet 40 mg (has no administration in time range)   QUEtiapine (SEROQUEL) tablet 100 mg (has no administration in time range)   sodium chloride flush 0.9 % injection 10 mL (has no administration in time range)   sodium chloride flush 0.9 % injection 10 mL (has no administration in time range)   acetaminophen (TYLENOL) tablet 650 mg (has no administration in time range)     Or   acetaminophen (TYLENOL) suppository 650 mg (has no administration in time range)   polyethylene glycol (GLYCOLAX) packet 17 g (has no administration in time range)   promethazine (PHENERGAN) tablet 12.5 mg (has no administration in time range)     Or   ondansetron (ZOFRAN) injection 4 mg (has no administration in time range)   enoxaparin (LOVENOX) injection 40 mg (has no administration in time range)   LORazepam (ATIVAN) injection 2 mg (has no administration in time range)   vancomycin (VANCOCIN) 1,000 mg in dextrose 5 % 250 mL IVPB (1,000 mg Intravenous New Bag 11/25/20 0820)   cefepime (MAXIPIME) 2 g IVPB minibag (0 g Intravenous Stopped 11/25/20 0823)   morphine (PF) injection 2 mg (2 mg Intravenous Given 11/25/20 0821)   ibuprofen (ADVIL;MOTRIN) tablet 600 mg (has no administration in time range)         Past Medical History:        Diagnosis Date    Back pain     Depression 12/9/2019    GERD (gastroesophageal reflux disease)     Hepatitis C     MRSA (methicillin resistant staph aureus) culture positive 09/25/2020    L5 disc asp    Polysubstance abuse ([address]     Wears glasses        Past Surgical History:        Procedure Laterality Date    APPENDECTOMY      SHOULDER SURGERY Left 4/1/14    UPPER GASTROINTESTINAL ENDOSCOPY  2014       Medications Prior to Admission:    Medications Prior to Admission: linezolid (ZYVOX) 600 MG tablet, Take 600 mg by mouth 2 times daily  QUEtiapine (SEROQUEL) 100 MG tablet, Take 100 mg by mouth nightly  folic acid (FOLVITE) 1 MG tablet, Take 1 mg by mouth daily  gabapentin (NEURONTIN) 300 MG capsule, Take 300 mg by mouth 3 times daily. buPROPion (WELLBUTRIN SR) 100 MG extended release tablet, Take 100 mg by mouth 2 times daily  ibuprofen (ADVIL;MOTRIN) 800 MG tablet, Take 800 mg by mouth every 6 hours as needed for Pain  pantoprazole (PROTONIX) 40 MG tablet, Take 40 mg by mouth daily  OLANZapine (ZYPREXA) 10 MG tablet, Take 1 tablet by mouth nightly  sulfamethoxazole-trimethoprim (BACTRIM DS;SEPTRA DS) 800-160 MG per tablet, Take 1 tablet by mouth every 12 hours  busPIRone (BUSPAR) 15 MG tablet, Take 15 mg by mouth 3 times daily as needed Indications: Feeling Anxious    Allergies:  Patient has no known allergies. Social History:   TOBACCO:   reports that he has been smoking cigarettes. He has been smoking about 1.00 pack per day. He has never used smokeless tobacco.  ETOH:   reports no history of alcohol use. Patient currently lives ?     Family History:       Problem Relation Age of Onset    Cancer Mother     Heart Disease Father        REVIEW OF SYSTEMS:  10 point ROS obtained, as per HPI, otherwise NEG    PHYSICAL EXAM:  /71   Pulse 69   Temp 98.4 °F (36.9 °C) (Oral)   Resp 28   Ht 5' 9\" (1.753 m)   Wt 141 lb 15.6 oz (64.4 kg)   SpO2 100%   BMI 20.97 kg/m²   General appearance:  Sleepy ,cooperative and appears in no distress  Head: Normocephalic, without obvious abnormality, atraumatic  Eyes: conjunctivae/corneas clear, EOM's intact. Ears: normal external  ears  Neck: no JVD, supple,   Lungs: clear to auscultation bilaterally  Heart: regular rate and rhythm, S1, S2 normal, no murmur, regular rate and rhythm   Abdomen:soft, NT,ND, BS+  Extremities:Pedal edema non-pitting, no sign of DVT  Skin:  Multiple BUE ulcers-sugg of IVDA  Neurologic: somnolent    DATA:    CBC with Differential:    Lab Results   Component Value Date    WBC 17.9 11/24/2020    RBC 4.25 11/24/2020    HGB 12.3 11/24/2020    HCT 37.1 11/24/2020     11/24/2020    MCV 87.3 11/24/2020    SEGSPCT 79.6 12/20/2011    LYMPHOPCT 13.9 11/24/2020    EOSPCT 3.8 12/20/2011    DIFFTYPE Auto 12/20/2011     BMP:    Lab Results   Component Value Date     11/24/2020    K 4.2 11/24/2020     11/24/2020    CO2 23 11/24/2020    BUN 20 11/24/2020    CREATININE 1.2 11/24/2020    CALCIUM 8.4 11/24/2020    GFRAA >60 11/24/2020    GFRAA >60 12/20/2011    LABGLOM >60 11/24/2020    GLUCOSE 85 11/24/2020       ASSESSMENT / PLAN:      Sepsis sec to Lumbosacral discitis / osteomyelitis with radiculopathy: involving L5-S1   Was admitted 9/23-9/26 at [facility] and [facility] 10/8-10/14-for same issue  Neg bx at [facility] been on antibx prior  -Plan is for ID Consult,Neurosurg and IR consult for biopsy  On Vanc/cefepime  Pain control.     Heroin and meth addiction-IVDU ongoing  -multiple UE skin lesions  -wound care   -While at [facility],was caught on 10/11 injecting heroin in bathroom  -monitor for withdrawal.  -clonidine PRN       Depression :   - Hx of  Psychoactive substance-induced organic mood disorder   mood stable.    -Continue home meds-seroquel,zyprexa,welbutrin,buspirone        Ferguson's esophagus with dysplasia  - on PPI Hepatitis C virus infection without hepatic coma  -Outpt follow up for eval of tx.  -negative recent HIV    Hx of MRSA abscess- contact isolation     See orders  Disposition:>3 days    MD LIBRADO  Hospitalist Service

## 2020-11-25 NOTE — CONSULTS
NEUROSURGERY CONSULT NOTE    Thai Reach  [id number]   1980 11/25/2020    Requesting physician: Nicolasa William MD    Reason for consultation: Discitis/Osteomyelitis    History of present illness: Patient is a 37 y.o. male  That  has a past medical history of Back pain, Depression (12/9/2019), GERD (gastroesophageal reflux disease), Hepatitis C, MRSA (methicillin resistant staph aureus) culture positive (09/25/2020), Polysubstance abuse ([address] and Wears glasses. Patient presented on 11/24/2020 to [facility] ED c/o chest and lumbar back pain. Patient was recently admitted for osteomyelitis, but left [facility] [address] ROS:   GENERAL:  Denies fever or recent illness. Denies weight changes   EYES:  Denies vision change or diplopia  EARS:  Denies hearing loss  CARDIAC:  Denies chest pain  RESPIRATORY:  Denies shortness of breath  SKIN:  Endorses multiple BUE ulcers  HEM:  Denies excessive bruising  PSYCH:  Denies anxiety or depression  NEURO:  Denies headache, numbness or tingling or lateralizing weakness   :  Denies urinary difficulty  GI: Denies nausea, vomiting, diarrhea or constipation  MUSCULOSKELETAL: Endorses back pain    No Known Allergies    Past Medical History:   Diagnosis Date    Back pain     Depression 12/9/2019    GERD (gastroesophageal reflux disease)     Hepatitis C     MRSA (methicillin resistant staph aureus) culture positive 09/25/2020    L5 disc asp    Polysubstance abuse ([address]     Wears glasses         Past Surgical History:   Procedure Laterality Date    APPENDECTOMY      SHOULDER SURGERY Left 4/1/14    UPPER GASTROINTESTINAL ENDOSCOPY  2014       Social History     Occupational History    Not on file   Tobacco Use    Smoking status: Current Every Day Smoker     Packs/day: 1.00     Types: Cigarettes    Smokeless tobacco: Never Used   Substance and Sexual Activity    Alcohol use: No     Alcohol/week: 0.0 standard drinks    Drug use:  Yes Types: Marijuana     Comment: last used this weekend    Sexual activity: Yes     Partners: Female        Family History   Problem Relation Age of Onset    Cancer Mother     Heart Disease Father         Outpatient Medications Marked as Taking for the 11/25/20 encounter [facility] HOSPITAL Encounter)   Medication Sig Dispense Refill    linezolid (ZYVOX) 600 MG tablet Take 600 mg by mouth 2 times daily      QUEtiapine (SEROQUEL) 100 MG tablet Take 100 mg by mouth nightly      folic acid (FOLVITE) 1 MG tablet Take 1 mg by mouth daily      gabapentin (NEURONTIN) 300 MG capsule Take 300 mg by mouth 3 times daily.       buPROPion (WELLBUTRIN SR) 100 MG extended release tablet Take 100 mg by mouth 2 times daily      ibuprofen (ADVIL;MOTRIN) 800 MG tablet Take 800 mg by mouth every 6 hours as needed for Pain      pantoprazole (PROTONIX) 40 MG tablet Take 40 mg by mouth daily      OLANZapine (ZYPREXA) 10 MG tablet Take 1 tablet by mouth nightly 30 tablet 0        Current Facility-Administered Medications   Medication Dose Route Frequency Provider Last Rate Last Dose    influenza quadrivalent split vaccine (FLUZONE;FLUARIX;FLULAVAL;AFLURIA) injection 0.5 mL  0.5 mL Intramuscular Prior to discharge Pura Mckinney MD        [facility]) extended release tablet 100 mg  100 mg Oral BID Pura Mckinney MD        busPIRone (BUSPAR) tablet 15 mg  15 mg Oral TID PRN Pura Mckinney MD        folic acid (FOLVITE) tablet 1 mg  1 mg Oral Daily Pura Mckinney MD   1 mg at 11/25/20 0945    gabapentin (NEURONTIN) capsule 300 mg  300 mg Oral TID Pura Mckinney MD   300 mg at 11/25/20 0945    OLANZapine (ZYPREXA) tablet 10 mg  10 mg Oral Nightly Pura Mckinney MD        pantoprazole (PROTONIX) tablet 40 mg  40 mg Oral Daily Pura Mckinney MD   40 mg at 11/25/20 0950    QUEtiapine (SEROQUEL) tablet 100 mg  100 mg Oral Nightly Pura Mckinney MD        sodium chloride flush 0.9 % injection 10 mL  10 mL Intravenous 2 times per day Nora Rosado MD   10 mL at 11/25/20 0946    sodium chloride flush 0.9 % injection 10 mL  10 mL Intravenous PRN Nora Rosado MD        acetaminophen (TYLENOL) tablet 650 mg  650 mg Oral Q6H PRN Nora Rosado MD        Or    acetaminophen (TYLENOL) suppository 650 mg  650 mg Rectal Q6H PRN Nora Rosado MD        polyethylene glycol (GLYCOLAX) packet 17 g  17 g Oral Daily PRN Nora Rosado MD        promethazine (PHENERGAN) tablet 12.5 mg  12.5 mg Oral Q6H PRN Nora Rosado MD        Or    ondansetron (ZOFRAN) injection 4 mg  4 mg Intravenous Q6H PRN Nora Rosado MD        enoxaparin (LOVENOX) injection 40 mg  40 mg Subcutaneous Daily Nora Rosado MD   40 mg at 11/25/20 0945    LORazepam (ATIVAN) injection 2 mg  2 mg Intravenous Q6H PRN Nora Rosado MD   2 mg at 11/25/20 0945    cefepime (MAXIPIME) 2 g IVPB minibag  2 g Intravenous Q12H Nora Rosado MD   Stopped at 11/25/20 0823    morphine (PF) injection 2 mg  2 mg Intravenous Q3H PRN Nora Rosado MD   2 mg at 11/25/20 0821    ibuprofen (ADVIL;MOTRIN) tablet 600 mg  600 mg Oral Q6H PRN Nora Rosado MD        vancomycin (VANCOCIN) 1,500 mg in dextrose 5 % 250 mL IVPB  1,500 mg Intravenous Q12H Nora Rosado MD            Objective:  /78   Pulse 88   Temp 98.5 °F (36.9 °C) (Oral)   Resp 18   Ht 5' 9\" (1.753 m)   Wt 141 lb 15.6 oz (64.4 kg)   SpO2 96%   BMI 20.97 kg/m²     Physical Exam:   Patient seen and examined  GCS:  4 - Opens eyes on own  5 - Alert and oriented  6 - Follows simple motor commands  General: Well developed. Alert and cooperative in no acute distress. HENT: atraumatic, neck supple  Eyes: Optic discs: Not tested  Pulmonary: unlabored respiratory effort  Cardiovascular:  Warm well perfused.  No peripheral edema  Gastrointestinal: abdomen soft, NT, ND    Neurological:  Mental Status: Awake, alert, oriented x 4, speech clear and appropriate  Attention: Intact  Language: No aphasia or dysarthria noted  Sensation: Intact to all extremities to light touch  Coordination: Intact    Cranial Nerves:  II: Visual acuity not tested, denies new visual changes / diplopia  III, IV, VI: PERRL, 3 mm bilaterally, EOMI, no nystagmus noted  V: Facial sensation intact bilaterally to touch  VII: Face symmetric  VIII: Hearing intact bilaterally to spoken voice  IX: Palate movement equal bilaterally  XI: Shoulder shrug equal bilaterally  XII: Tongue midline    Musculoskeletal:   Gait: Not tested   Assist devices: None   Tone: Normal  Motor strength:    Right  Left    Right  Left    Deltoid  5 5  Hip Flex  5 5   Biceps  5 5  Knee Extensors  5 5   Triceps  5 5  Knee Flexors  5 5   Wrist Ext  5 5  Ankle Dorsiflex. 5 5   Wrist Flex  5 5  Ankle Plantarflex. 5 5   Handgrip  5 5  Ext Aashish Longus  5 5   Thumb Ext  5 5         Radiological Findings:  Mri Lumbar Spine W Wo Contrast  Result Date: 11/24/2020  Abnormal appearance of the L5 and S1 levels. There is intervertebral disc space narrowing with erosive endplate changes and small intervertebral fluid collection. Diffuse signal abnormality is noted throughout the L5 and S1 vertebral bodies indicating marrow edema. On the postcontrast images, there is enhancement of the intervertebral space as well as enhancement of the epidural margin posterior to the L5-S1 levels and patchy enhancement of the L5 and S1 vertebral bodies. The findings are compatible with discitis/osteomyelitis with a suspect small epidural abscess. Labs:  Recent Labs     11/24/20  1600   WBC 17.9*   HGB 12.3*   HCT 37.1*          Recent Labs     11/24/20  1600   *   K 4.2      CO2 23   BUN 20   CREATININE 1.2   GLUCOSE 85   CALCIUM 8.4       No results for input(s): PROTIME, INR, APTT in the last 72 hours.     Patient Active Problem List    Diagnosis Date Noted    Mild malnutrition ([facility] 11/25/2020    Epidural abscess 11/24/2020    [facility] 09/23/2020    Tobacco abuse 09/23/2020    Depression 12/09/2019    Acute respiratory failure ([facility] 12/05/2019    IVDU (intravenous drug user) 12/05/2019    Acute metabolic encephalopathy 03/47/7465    Unresponsive     Polysubstance abuse ([facility]     Intentional drug overdose ([facility]     Disorder of electrolytes     Leukocytosis     Hepatitis C virus infection without hepatic coma 03/25/2016    Ferguson's esophagus with dysplasia 03/22/2016    Gastroesophageal reflux disease without esophagitis 03/22/2016    Psychoactive substance-induced organic mood disorder ([facility] 12/21/2011       Assessment:  Patient is a 37 y.o. male w/discitis/osteomyelitis with a suspected small epidural abscess. Plan:  1. No emergent neurosurgical intervention indicated  2. Neurologic exams frequency: Q4H  3. For change in exam MUST contact neurosurgery team along with critical care or primary team  4. Discitis/Osteomyelitis:  5. - ID consulted to manage abx  6. - If symptoms worsen then repeat MRI Lumbar w contrast and re-consult if any changes are seen on scan  7. Pain control: Managed by medical team  8. PT/OT consulted, appreciate recs  9. Advance diet / activity per primary team  10. Thank you for consult. Will sign off. Please call with any questions or decline in neurological status    DISPO: Dispo timing to be determined by primary team once patient is medically stable for discharge. Patient was discussed with Dr. Catalina Paredes who agrees with above assessment and plan.      Electronically signed by: Sol Mcburney, APRN-CNP, 11/25/2020 11:40 AM  [phone]

## 2020-11-25 NOTE — ED NOTES
[address] Renetta Rousseau hospitalist, returned call to Dr. Jevon Brown  11/24/20 [address]

## 2020-11-25 NOTE — CARE COORDINATION
refill/ meds to beds program?: No    Meds To Beds General Rules:  1. Can ONLY be done Monday- Friday between 8:30am-5pm  2. Prescription(s) must be in pharmacy by 3pm to be filled same day  3. Copy of patient's insurance/ prescription drug card and patient face sheet must be sent along with the prescription(s)  4. Cost of Rx cannot be added to hospital bill. If financial assistance is needed, please contact unit  or ;  or  CANNOT provide pharmacy voucher for patients co-pays  5.  Patients can then  the prescription on their way out of the hospital at discharge, or pharmacy can deliver to the bedside if staff is available. (payment due at time of pick-up or delivery - cash, check, or card accepted)     Able to afford home medications/ co-pay costs: {Yes/No:19726}    ADLS  Support Systems: None    PT AM-PAC:   /24  OT AM-PAC:   /24    HOUSING  Home Environment: ***  Steps: ***    Plans to RETURN to current housing: {Yes/No:19726}  Barriers to RETURNING to current housing: ***    Home Care Information  Currently ACTIVE with [address]: {Yes/No:19726}  Home Care Agency: [address] Providers:14776}    Currently ACTIVE with [place] on Aging: {Yes/No:19726}  Passport/ Waiver: {Yes/No:19726}  Passport/ Waiver Services: {[facility] Services:57305}    : *** Phone: ***      Durable Medical Equipment  DME Provider: ***  Equipment: {EQUIPMENT:469703721}    Home Oxygen and Respiratory Equipment  Has HOME OXYGEN prior to admission: {Yes/No:19726}  [address]: { [employer]:23422}  Other Respiratory Equipment: ***    Informed of need to bring portable home O2 tank on day of DISCHARGE for nursing to connect prior to leaving: {Yes/No:19726}  Verbalized agreement/Understanding: {Yes/No:19726}  Person to bring portable tank at discharge: ***    Dialysis  Active with HD/PD prior to admission: {Yes/No:19726}  Nephrologist: ***    HD Center:  { HD Facilities:87397}    DISCHARGE PLAN:  Disposition: {CM DISPOSITION:19073}    Transportation PLAN for discharge: {CWK:40169}     Factors facilitating achievement of predicted outcomes: {Patient Strengths:593816271}    Barriers to discharge: {Barriers:650814273}    Additional Case Management Notes: ***    The Plan for Transition of Care is related to the following treatment goals of Epidural abscess [G06.2]    The Patient and/or patient representative Sharan Smith and his family were provided with a choice of provider and agrees with the discharge plan {RESPONSES; Yes/No/Not Indicated:88282}    Freedom of choice list was provided with basic dialogue that supports the patient's individualized plan of care/goals and shares the quality data associated with the providers.  {RESPONSES; Yes/No/Not Indicated:99955}    Care Transition patient: {Yes/No:13175}    Isidro Tom RN  [facility] [employer]  Case Management Department  Ph: ***   Fax: ***

## 2020-11-26 LAB
ANION GAP SERPL CALCULATED.3IONS-SCNC: 8 MMOL/L (ref 3–16)
BASOPHILS ABSOLUTE: 0.1 K/UL (ref 0–0.2)
BASOPHILS RELATIVE PERCENT: 1 %
BUN BLDV-MCNC: 17 MG/DL (ref 7–20)
CALCIUM SERPL-MCNC: 9.2 MG/DL (ref 8.3–10.6)
CHLORIDE BLD-SCNC: 109 MMOL/L (ref 99–110)
CO2: 23 MMOL/L (ref 21–32)
CREAT SERPL-MCNC: 0.9 MG/DL (ref 0.9–1.3)
EKG ATRIAL RATE: 95 BPM
EKG DIAGNOSIS: NORMAL
EKG P AXIS: 84 DEGREES
EKG P-R INTERVAL: 150 MS
EKG Q-T INTERVAL: 366 MS
EKG QRS DURATION: 80 MS
EKG QTC CALCULATION (BAZETT): 459 MS
EKG R AXIS: 19 DEGREES
EKG T AXIS: 79 DEGREES
EKG VENTRICULAR RATE: 95 BPM
EOSINOPHILS ABSOLUTE: 0.2 K/UL (ref 0–0.6)
EOSINOPHILS RELATIVE PERCENT: 1.9 %
GFR AFRICAN AMERICAN: >60
GFR NON-AFRICAN AMERICAN: >60
GLUCOSE BLD-MCNC: 103 MG/DL (ref 70–99)
HCT VFR BLD CALC: 38 % (ref 40.5–52.5)
HEMOGLOBIN: 12.6 G/DL (ref 13.5–17.5)
LYMPHOCYTES ABSOLUTE: 2.5 K/UL (ref 1–5.1)
LYMPHOCYTES RELATIVE PERCENT: 25.9 %
MCH RBC QN AUTO: 28.9 PG (ref 26–34)
MCHC RBC AUTO-ENTMCNC: 33.2 G/DL (ref 31–36)
MCV RBC AUTO: 87 FL (ref 80–100)
MONOCYTES ABSOLUTE: 0.8 K/UL (ref 0–1.3)
MONOCYTES RELATIVE PERCENT: 8.2 %
NEUTROPHILS ABSOLUTE: 6 K/UL (ref 1.7–7.7)
NEUTROPHILS RELATIVE PERCENT: 63 %
PDW BLD-RTO: 15.2 % (ref 12.4–15.4)
PLATELET # BLD: 349 K/UL (ref 135–450)
PMV BLD AUTO: 8 FL (ref 5–10.5)
POTASSIUM REFLEX MAGNESIUM: 4.3 MMOL/L (ref 3.5–5.1)
RBC # BLD: 4.37 M/UL (ref 4.2–5.9)
SODIUM BLD-SCNC: 140 MMOL/L (ref 136–145)
WBC # BLD: 9.5 K/UL (ref 4–11)

## 2020-11-26 PROCEDURE — 36415 COLL VENOUS BLD VENIPUNCTURE: CPT

## 2020-11-26 PROCEDURE — 2580000003 HC RX 258: Performed by: INTERNAL MEDICINE

## 2020-11-26 PROCEDURE — 1200000000 HC SEMI PRIVATE

## 2020-11-26 PROCEDURE — 6370000000 HC RX 637 (ALT 250 FOR IP): Performed by: INTERNAL MEDICINE

## 2020-11-26 PROCEDURE — 6360000002 HC RX W HCPCS: Performed by: INTERNAL MEDICINE

## 2020-11-26 PROCEDURE — 85025 COMPLETE CBC W/AUTO DIFF WBC: CPT

## 2020-11-26 PROCEDURE — 80048 BASIC METABOLIC PNL TOTAL CA: CPT

## 2020-11-26 RX ORDER — MORPHINE SULFATE 2 MG/ML
2 INJECTION, SOLUTION INTRAMUSCULAR; INTRAVENOUS
Status: COMPLETED | OUTPATIENT
Start: 2020-11-26 | End: 2020-11-26

## 2020-11-26 RX ADMIN — ENOXAPARIN SODIUM 40 MG: 40 INJECTION SUBCUTANEOUS at 10:04

## 2020-11-26 RX ADMIN — PANTOPRAZOLE SODIUM 40 MG: 40 TABLET, DELAYED RELEASE ORAL at 06:51

## 2020-11-26 RX ADMIN — FOLIC ACID 1 MG: 1 TABLET ORAL at 10:05

## 2020-11-26 RX ADMIN — GABAPENTIN 300 MG: 300 CAPSULE ORAL at 13:07

## 2020-11-26 RX ADMIN — CEFEPIME 2 G: 2 INJECTION, POWDER, FOR SOLUTION INTRAMUSCULAR; INTRAVENOUS at 18:15

## 2020-11-26 RX ADMIN — CEFEPIME 2 G: 2 INJECTION, POWDER, FOR SOLUTION INTRAMUSCULAR; INTRAVENOUS at 06:50

## 2020-11-26 RX ADMIN — BUPROPION HYDROCHLORIDE 100 MG: 100 TABLET, FILM COATED, EXTENDED RELEASE ORAL at 20:07

## 2020-11-26 RX ADMIN — MORPHINE SULFATE 2 MG: 2 INJECTION, SOLUTION INTRAMUSCULAR; INTRAVENOUS at 21:55

## 2020-11-26 RX ADMIN — Medication 10 ML: at 10:05

## 2020-11-26 RX ADMIN — VANCOMYCIN HYDROCHLORIDE 1500 MG: 10 INJECTION, POWDER, LYOPHILIZED, FOR SOLUTION INTRAVENOUS at 08:14

## 2020-11-26 RX ADMIN — QUETIAPINE FUMARATE 100 MG: 25 TABLET ORAL at 20:07

## 2020-11-26 RX ADMIN — VANCOMYCIN HYDROCHLORIDE 1500 MG: 10 INJECTION, POWDER, LYOPHILIZED, FOR SOLUTION INTRAVENOUS at 20:08

## 2020-11-26 RX ADMIN — MORPHINE SULFATE 2 MG: 2 INJECTION, SOLUTION INTRAMUSCULAR; INTRAVENOUS at 06:45

## 2020-11-26 RX ADMIN — MORPHINE SULFATE 2 MG: 2 INJECTION, SOLUTION INTRAMUSCULAR; INTRAVENOUS at 11:31

## 2020-11-26 RX ADMIN — MORPHINE SULFATE 2 MG: 2 INJECTION, SOLUTION INTRAMUSCULAR; INTRAVENOUS at 18:52

## 2020-11-26 RX ADMIN — GABAPENTIN 300 MG: 300 CAPSULE ORAL at 10:05

## 2020-11-26 RX ADMIN — GABAPENTIN 300 MG: 300 CAPSULE ORAL at 20:07

## 2020-11-26 RX ADMIN — BUPROPION HYDROCHLORIDE 100 MG: 100 TABLET, FILM COATED, EXTENDED RELEASE ORAL at 10:08

## 2020-11-26 RX ADMIN — OLANZAPINE 10 MG: 5 TABLET, FILM COATED ORAL at 20:07

## 2020-11-26 ASSESSMENT — PAIN - FUNCTIONAL ASSESSMENT
PAIN_FUNCTIONAL_ASSESSMENT: ACTIVITIES ARE NOT PREVENTED

## 2020-11-26 ASSESSMENT — PAIN DESCRIPTION - ORIENTATION
ORIENTATION: LOWER

## 2020-11-26 ASSESSMENT — PAIN DESCRIPTION - PROGRESSION
CLINICAL_PROGRESSION: GRADUALLY WORSENING
CLINICAL_PROGRESSION: GRADUALLY WORSENING
CLINICAL_PROGRESSION: NOT CHANGED
CLINICAL_PROGRESSION: NOT CHANGED

## 2020-11-26 ASSESSMENT — PAIN DESCRIPTION - ONSET
ONSET: ON-GOING

## 2020-11-26 ASSESSMENT — PAIN DESCRIPTION - LOCATION
LOCATION: BACK

## 2020-11-26 ASSESSMENT — PAIN DESCRIPTION - PAIN TYPE
TYPE: CHRONIC PAIN

## 2020-11-26 ASSESSMENT — PAIN SCALES - GENERAL
PAINLEVEL_OUTOF10: 8
PAINLEVEL_OUTOF10: 0
PAINLEVEL_OUTOF10: 8
PAINLEVEL_OUTOF10: 7
PAINLEVEL_OUTOF10: 8

## 2020-11-26 ASSESSMENT — PAIN DESCRIPTION - FREQUENCY
FREQUENCY: CONTINUOUS

## 2020-11-26 ASSESSMENT — PAIN DESCRIPTION - DIRECTION
RADIATING_TOWARDS: BOTH LEGS
RADIATING_TOWARDS: BLE

## 2020-11-26 ASSESSMENT — PAIN DESCRIPTION - DESCRIPTORS
DESCRIPTORS: ACHING
DESCRIPTORS: ACHING
DESCRIPTORS: PATIENT UNABLE TO DESCRIBE

## 2020-11-26 NOTE — PROGRESS NOTES
Clinical Pharmacy Progress Note    Admit date: 11/25/2020     Subjective/Objective:  Pt is a 43yom with PMHx that includes Hep C, polysubstance abuse, GERD, depression, and PTSD who was first admitted to [facility] on 9/23/20 with L5-S1 osteomyelitis / discitis - CT drainage was unable to be completed due to pain, but an arm abscess grew MRSA. Pt was treated with Cefepime & Vanc during admission. Pt then left AMA on 9/26 without antibiotic therapy. Pt then was admitted to [address] on 10/8/20, again with L5-Sa discitis / osteomyelitis. Pt was treated with Cefepime & Vancomycin during admission, and had a lumbar biopsy (culture resulted with no growth). During that admission, blood cultures on 10/8 were positive for methicillin-resistant Staph epidermidis. Pt was discharged home on 10/14/20 with Linezolid x4 weeks per ID recommendations. Pt states he stopped taking the Linezolid due to concerns of an interaction with his Bupropion. Pt saw infectious disease in the office on 11/18, and the plan was to start outpt infusions of Dalbavancin (weekly x2 doses), but [facility] outpt infusion center was never able to reach patient to schedule the infusion appointment. Pt now presents for continued treatment of his L5-S1 discitis / osteomyelitis. Pharmacy is consulted to dose Vancomycin per Dr. Loura Scheuermann    Current antibiotics:  Cefepime 2g IV q12h - day #3  Vancomycin - Pharmacy to dose - day #3   1g IV q12h (11/24-11/25)   1.5g IV q12h (11/25 - current)    During admission at [facility] in September, pt was on Vancomycin 1g IV q8h. Pt left before levels could be obtained. At [facility] in October, pt was on 1.5g IV q12h with levels appropriate per AUC dosing. SCr 0.9-1.1 at that time. Recent Labs     11/24/20  1600 11/26/20  0640   * 140   K 4.2 4.3    109   CO2 23 23   BUN 20 17   CREATININE 1.2 0.9   GLUCOSE 85 103*       Estimated Creatinine Clearance: 98 mL/min (based on SCr of 0.9 mg/dL).     Lab

## 2020-11-26 NOTE — PLAN OF CARE
Problem: Pain:  Goal: Control of chronic pain  Description: Control of chronic pain  Outcome: Ongoing  Note: Has slept most of night. C/o pain in lower back radiating to both legs. Unable to describe pain. Requested pain medication but then fell asleep. Awake for lab draw this AM and given Morphine IVP for pain. Heat pack applied to back. Will continue to monitor and plan of care. Problem: Falls - Risk of:  Goal: Absence of physical injury  Description: Absence of physical injury  Outcome: Ongoing  Note: At medium risk for falls per Pat Mort scale. Oriented x 4. Ambulated to bathroom with steady gait. Denies weakness in legs. Needed items in reach. Clear path to bathroom. Bed in low locked position. Will continue to monitor and plan of care.

## 2020-11-26 NOTE — PROGRESS NOTES
Has been sleeping soundly since Ativan given at 1830. Awakened fairly easily for VS. Afebrile. Ambulated to bathroom to void with supervision. Gait steady. HS meds given. C/o continuous pain in back and asking for pain medication. Morphine had  so I texted MD for reorder. He had fallen back asleep before I could give it. Will continue to monitor.

## 2020-11-26 NOTE — PROGRESS NOTES
HOSPITAL MEDICINE  - PROGRESS NOTE    Admit Date: 11/25/2020         Interval History:  37 y.o. male,ongoing IV Drug abuse ,CAD,Hep C ,MRSA arm abscess who presents as a transfer from [facility] this am,with multiple symptoms. - admitted with back pain with recent diagnosis of  Osteomyelitis-   No  bowel incontinence, describes some urinary incontinence no saddle anesthesia.   -No numbness or weakness of his legs     Subjective: sleeping    Objective:Afebrile    Diet: DIET GENERAL;  Dietary Nutrition Supplements: Standard High Calorie Oral Supplement, Frozen Oral Supplement  Pain is: Moderate  Nausea: Moderate  Bowel Movement/Flatus yes    Data:   Scheduled Meds: Reviewed  Continuous Infusions:    Intake/Output Summary (Last 24 hours) at 11/26/2020 0827  Last data filed at 11/25/2020 2347  Gross per 24 hour   Intake 480 ml   Output 800 ml   Net -320 ml     CBC:   Recent Labs     11/26/20  0640   WBC 9.5   HGB 12.6*        BMP:  Recent Labs     11/26/20  0640      K 4.3      CO2 23   BUN 17   CREATININE 0.9   GLUCOSE 103*     ABGs:   Lab Results   Component Value Date    PHART 7.357 12/06/2019    PO2ART 104.8 12/06/2019    PEK2OJS 44.0 12/06/2019     INR:   Recent Labs     11/25/20  1156   INR 0.97       Objective:   Vitals: /67   Pulse 69   Temp 98.2 °F (36.8 °C) (Oral)   Resp 14   Ht 5' 9\" (1.753 m)   Wt 141 lb 15.6 oz (64.4 kg)   SpO2 98%   BMI 20.97 kg/m²   General appearance appears stated age and cooperative  Skin: Skin color, texture, turgor normal.   HEENT: Head: Normocephalic, no lesions, without obvious abnormality.   Neck: supple  Lungs: clear to auscultation bilaterally  Heart: regular rate and rhythm, S1, S2 normal, no murmur, click, rub or gallop  Abdomen: soft, non-tender; bowel sounds normal; no masses,  no organomegaly  Extremities: extremities normal, atraumatic, no cyanosis or edema    Neurologic: Mental status: sleepy      Assessment & Plan:         Lumbosacral discitis / osteomyelitis with radiculopathy: involving L5-S1   Was [facility] 9/23-9/26 at [facility] 10/8-10/14-for same issue  Neg bx at [facility] been on antibx prior   ID Consult,Neurosurg  Input appreciated-no OR plans  On Vanc/cefepime  Pain control.     IVDU:IV heroin-ongoing  -multiple UE skin lesions  -wound care   -While at [facility],was caught on 10/11 injecting heroin in bathroom  -monitor for withdrawal.  -clonidine PRN        Depression :   - Hx of  Psychoactive substance-induced organic mood disorder   mood stable.    -Continue home meds-seroquel,zyprexa,welbutrin,buspirone        Ferguson's esophagus with dysplasia  - on PPI        Hepatitis C virus infection without hepatic coma  -Outpt follow up for eval of tx.  -negative recent HIV     Hx of MRSA abscess- contact isolation      See orders   Disposition:>2 days    Eliud Loving MD

## 2020-11-27 LAB
REASON FOR REJECTION: NORMAL
REJECTED TEST: NORMAL

## 2020-11-27 PROCEDURE — 6370000000 HC RX 637 (ALT 250 FOR IP): Performed by: INTERNAL MEDICINE

## 2020-11-27 PROCEDURE — 6360000002 HC RX W HCPCS: Performed by: INTERNAL MEDICINE

## 2020-11-27 PROCEDURE — 99233 SBSQ HOSP IP/OBS HIGH 50: CPT | Performed by: INTERNAL MEDICINE

## 2020-11-27 PROCEDURE — 2580000003 HC RX 258: Performed by: INTERNAL MEDICINE

## 2020-11-27 PROCEDURE — 36415 COLL VENOUS BLD VENIPUNCTURE: CPT

## 2020-11-27 PROCEDURE — 02HV33Z INSERTION OF INFUSION DEVICE INTO SUPERIOR VENA CAVA, PERCUTANEOUS APPROACH: ICD-10-PCS | Performed by: INTERNAL MEDICINE

## 2020-11-27 PROCEDURE — 36569 INSJ PICC 5 YR+ W/O IMAGING: CPT

## 2020-11-27 PROCEDURE — 1200000000 HC SEMI PRIVATE

## 2020-11-27 PROCEDURE — C1751 CATH, INF, PER/CENT/MIDLINE: HCPCS

## 2020-11-27 RX ORDER — SODIUM CHLORIDE 0.9 % (FLUSH) 0.9 %
10 SYRINGE (ML) INJECTION PRN
Status: DISCONTINUED | OUTPATIENT
Start: 2020-11-27 | End: 2020-11-27 | Stop reason: SDUPTHER

## 2020-11-27 RX ORDER — LANOLIN ALCOHOL/MO/W.PET/CERES
3 CREAM (GRAM) TOPICAL NIGHTLY
Status: DISCONTINUED | OUTPATIENT
Start: 2020-11-27 | End: 2020-11-28 | Stop reason: HOSPADM

## 2020-11-27 RX ORDER — SODIUM CHLORIDE 0.9 % (FLUSH) 0.9 %
10 SYRINGE (ML) INJECTION EVERY 12 HOURS SCHEDULED
Status: DISCONTINUED | OUTPATIENT
Start: 2020-11-27 | End: 2020-11-27 | Stop reason: SDUPTHER

## 2020-11-27 RX ORDER — CLONIDINE HYDROCHLORIDE 0.1 MG/1
0.1 TABLET ORAL PRN
Status: DISCONTINUED | OUTPATIENT
Start: 2020-11-27 | End: 2020-11-28 | Stop reason: HOSPADM

## 2020-11-27 RX ORDER — LIDOCAINE HYDROCHLORIDE 10 MG/ML
5 INJECTION, SOLUTION EPIDURAL; INFILTRATION; INTRACAUDAL; PERINEURAL ONCE
Status: DISCONTINUED | OUTPATIENT
Start: 2020-11-27 | End: 2020-11-28 | Stop reason: HOSPADM

## 2020-11-27 RX ORDER — DIPHENHYDRAMINE HCL 25 MG
25 TABLET ORAL NIGHTLY PRN
Status: DISCONTINUED | OUTPATIENT
Start: 2020-11-27 | End: 2020-11-28 | Stop reason: HOSPADM

## 2020-11-27 RX ORDER — TRAMADOL HYDROCHLORIDE 50 MG/1
50 TABLET ORAL PRN
Status: DISCONTINUED | OUTPATIENT
Start: 2020-11-27 | End: 2020-11-28 | Stop reason: HOSPADM

## 2020-11-27 RX ORDER — DICYCLOMINE HCL 20 MG
20 TABLET ORAL EVERY 6 HOURS PRN
Status: DISCONTINUED | OUTPATIENT
Start: 2020-11-27 | End: 2020-11-28 | Stop reason: HOSPADM

## 2020-11-27 RX ORDER — GABAPENTIN 300 MG/1
300 CAPSULE ORAL EVERY 8 HOURS PRN
Status: DISCONTINUED | OUTPATIENT
Start: 2020-11-27 | End: 2020-11-28 | Stop reason: HOSPADM

## 2020-11-27 RX ORDER — HYDROXYZINE PAMOATE 25 MG/1
50 CAPSULE ORAL EVERY 8 HOURS PRN
Status: DISCONTINUED | OUTPATIENT
Start: 2020-11-27 | End: 2020-11-28 | Stop reason: HOSPADM

## 2020-11-27 RX ORDER — PROMETHAZINE HYDROCHLORIDE 25 MG/1
25 TABLET ORAL EVERY 6 HOURS PRN
Status: DISCONTINUED | OUTPATIENT
Start: 2020-11-27 | End: 2020-11-28 | Stop reason: HOSPADM

## 2020-11-27 RX ORDER — IBUPROFEN 400 MG/1
800 TABLET ORAL EVERY 8 HOURS PRN
Status: DISCONTINUED | OUTPATIENT
Start: 2020-11-27 | End: 2020-11-28 | Stop reason: HOSPADM

## 2020-11-27 RX ADMIN — Medication 3 MG: at 20:38

## 2020-11-27 RX ADMIN — GABAPENTIN 300 MG: 300 CAPSULE ORAL at 20:38

## 2020-11-27 RX ADMIN — OLANZAPINE 10 MG: 5 TABLET, FILM COATED ORAL at 20:38

## 2020-11-27 RX ADMIN — VANCOMYCIN HYDROCHLORIDE 1500 MG: 10 INJECTION, POWDER, LYOPHILIZED, FOR SOLUTION INTRAVENOUS at 08:20

## 2020-11-27 RX ADMIN — VANCOMYCIN HYDROCHLORIDE 1500 MG: 10 INJECTION, POWDER, LYOPHILIZED, FOR SOLUTION INTRAVENOUS at 21:18

## 2020-11-27 RX ADMIN — IBUPROFEN 800 MG: 400 TABLET, FILM COATED ORAL at 14:51

## 2020-11-27 RX ADMIN — CLONIDINE HYDROCHLORIDE 0.1 MG: 0.1 TABLET ORAL at 20:39

## 2020-11-27 RX ADMIN — CEFEPIME 2 G: 2 INJECTION, POWDER, FOR SOLUTION INTRAMUSCULAR; INTRAVENOUS at 05:24

## 2020-11-27 RX ADMIN — ACETAMINOPHEN 650 MG: 325 TABLET ORAL at 08:16

## 2020-11-27 RX ADMIN — FOLIC ACID 1 MG: 1 TABLET ORAL at 08:17

## 2020-11-27 RX ADMIN — TRAMADOL HYDROCHLORIDE 50 MG: 50 TABLET, FILM COATED ORAL at 20:38

## 2020-11-27 RX ADMIN — Medication 10 ML: at 20:35

## 2020-11-27 RX ADMIN — DIPHENHYDRAMINE HYDROCHLORIDE 25 MG: 25 TABLET ORAL at 20:39

## 2020-11-27 RX ADMIN — QUETIAPINE FUMARATE 100 MG: 25 TABLET ORAL at 20:39

## 2020-11-27 RX ADMIN — PANTOPRAZOLE SODIUM 40 MG: 40 TABLET, DELAYED RELEASE ORAL at 05:24

## 2020-11-27 RX ADMIN — CEFEPIME 2 G: 2 INJECTION, POWDER, FOR SOLUTION INTRAMUSCULAR; INTRAVENOUS at 20:38

## 2020-11-27 RX ADMIN — GABAPENTIN 300 MG: 300 CAPSULE ORAL at 08:17

## 2020-11-27 RX ADMIN — GABAPENTIN 300 MG: 300 CAPSULE ORAL at 14:52

## 2020-11-27 RX ADMIN — BUPROPION HYDROCHLORIDE 100 MG: 100 TABLET, FILM COATED, EXTENDED RELEASE ORAL at 20:39

## 2020-11-27 ASSESSMENT — PAIN SCALES - GENERAL
PAINLEVEL_OUTOF10: 0
PAINLEVEL_OUTOF10: 0
PAINLEVEL_OUTOF10: 8
PAINLEVEL_OUTOF10: 0
PAINLEVEL_OUTOF10: 6
PAINLEVEL_OUTOF10: 4
PAINLEVEL_OUTOF10: 0
PAINLEVEL_OUTOF10: 9
PAINLEVEL_OUTOF10: 0
PAINLEVEL_OUTOF10: 4
PAINLEVEL_OUTOF10: 0

## 2020-11-27 ASSESSMENT — PAIN DESCRIPTION - PROGRESSION: CLINICAL_PROGRESSION: NOT CHANGED

## 2020-11-27 ASSESSMENT — PAIN DESCRIPTION - ORIENTATION: ORIENTATION: LOWER

## 2020-11-27 ASSESSMENT — PAIN DESCRIPTION - DIRECTION: RADIATING_TOWARDS: BLE

## 2020-11-27 ASSESSMENT — PAIN DESCRIPTION - PAIN TYPE: TYPE: CHRONIC PAIN

## 2020-11-27 ASSESSMENT — PAIN DESCRIPTION - LOCATION: LOCATION: BACK

## 2020-11-27 ASSESSMENT — PAIN DESCRIPTION - ONSET: ONSET: ON-GOING

## 2020-11-27 ASSESSMENT — PAIN DESCRIPTION - DESCRIPTORS: DESCRIPTORS: ACHING

## 2020-11-27 ASSESSMENT — PAIN - FUNCTIONAL ASSESSMENT: PAIN_FUNCTIONAL_ASSESSMENT: ACTIVITIES ARE NOT PREVENTED

## 2020-11-27 ASSESSMENT — PAIN DESCRIPTION - FREQUENCY: FREQUENCY: CONTINUOUS

## 2020-11-27 NOTE — FLOWSHEET NOTE
Spiritual Care Note  Referral for pastoral support. Met pt at bedside and provided empathic listening as pt shared about his life. Pt very tearful and emotional during our visit; pt expressed loneliness, anxiety, a deep grief for the loss of his father who  a year ago; he also shared being homeless and desperate about having no one and no place to go. He shared his mother cannot be trusted, and he has a wife who is homeless as well. Pt is deeply suffering. We talked about his drug use, and the way he numbs his pain; we talked about how he can cope. Pt shared, \"I read the bible. 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 \" But he has lost selena in God because \"God doesn't listen to me - he doesn't help me. \" Pt's loneliness is overwhelming him now. He feels isolated, and doesn't know how to move forward. Gave pt a safe space to share emotion; talked about his dad who was his best friend, and his relationship with God. Talked briefly about he can go forward, but mainly just stayed with his pain. I offered a prayer to close our encounter, and he was grateful for our visit. 20 1200   Encounter Summary   Services provided to: Patient   Referral/Consult From: Nurse   Support System Spouse   Continue Visiting   ( )   Complexity of Encounter High   Length of Encounter 30 minutes   Routine   Type Initial   Assessment Passive; Tearful;Grieving; Anxious; Loneliness; Hopeless;Spiritual struggle; Helplessness; Sad   Intervention Active listening;Explored feelings, thoughts, concerns;Explored coping resources;Nurtured hope;Prayer;Sustaining presence/ Ministry of presence; Discussed meaning/purpose;Discussed illness/injury and it's impact; Discussed belief system/Worship practices/selena;Discussed relationship with God   Outcome Engaged in conversation; Shared life review;Expressed feelings/needs/concerns; Tearful;Expressed regrets;Grieving;Venting emotion

## 2020-11-27 NOTE — PROGRESS NOTES
ID Follow-up NOTE    CC:   L5 / S1 diskitis, vertebral osteomyelitis  Antibiotics: Vancomycin, Cefepime    Admit Date: 2020  Hospital Day: 3    Subjective:     Patient c/o LBP, worse, rates 8-9 (out of 10), pain meds reduced yesterday by hospitalist.      Objective:     Patient Vitals for the past 8 hrs:   BP Temp Temp src Pulse Resp SpO2   20 0825 121/77 97.8 °F (36.6 °C) -- 64 16 98 %   20 0400 129/74 97.4 °F (36.3 °C) Oral 54 15 98 %     I/O last 3 completed shifts: In: 960 [P.O.:960]  Out: 900 [Urine:900]  No intake/output data recorded. EXAM:  GENERAL: No apparent distress. RA  HEENT: Membranes moist, no oral lesion  NECK:  Supple, no lymphadenopathy  LUNGS: Clear b/l, no rales, no dullness  CARDIAC: RRR, no murmur appreciated  ABD:  + BS, soft / NT  EXT:  No rash, no edema, no lesions - scars, track marks  NEURO: No focal neurologic findings  PSYCH: Orientation, sensorium, mood normal  LINES:  Peripheral iv       Data Review:  Lab Results   Component Value Date    WBC 9.5 2020    HGB 12.6 (L) 2020    HCT 38.0 (L) 2020    MCV 87.0 2020     2020     Lab Results   Component Value Date    CREATININE 0.9 2020    BUN 17 2020     2020    K 4.3 2020     2020    CO2 23 2020       Hepatic Function Panel:   Lab Results   Component Value Date    ALKPHOS 103 2020    ALT 39 2020    AST 38 2020    PROT 7.5 2020    PROT 8.8 2011    BILITOT 0.3 2020    LABALBU 4.0 2020       MICRO:   IR biopsy:  GS 1+WBC, no organism; cult no growth to date   BC x 2 no growth to date   SARS CoV-2 negative    10/12 IR bx - no growth  10/8 BC S epidermidis (NOT S aureus)     Abscess - MRSA (S clinda, tetra, R T/S; vanco PRIMITIVO 1)   BC no growth Corazon Peace)    IMAGIN/24 CXR neg     Lumbar MRI w/wo contrast  Abnormal appearance of the L5 and S1 levels.  There is intervertebral disc   space narrowing with erosive endplate changes and small intervertebral fluid   collection. Diffuse signal abnormality is noted throughout the L5 and S1   vertebral bodies indicating marrow edema. On the postcontrast images, there   is enhancement of the intervertebral space as well as enhancement of the   epidural margin posterior to the L5-S1 levels and patchy enhancement of the   L5 and S1 vertebral bodies. The findings are compatible with   discitis/osteomyelitis with a suspect small epidural abscess. Scheduled Meds:   influenza virus vaccine  0.5 mL Intramuscular Prior to discharge    buPROPion  100 mg Oral BID    folic acid  1 mg Oral Daily    gabapentin  300 mg Oral TID    OLANZapine  10 mg Oral Nightly    pantoprazole  40 mg Oral Daily    QUEtiapine  100 mg Oral Nightly    sodium chloride flush  10 mL Intravenous 2 times per day    enoxaparin  40 mg Subcutaneous Daily    cefepime  2 g Intravenous Q12H    vancomycin  1,500 mg Intravenous Q12H       Continuous Infusions:      PRN Meds:  busPIRone, sodium chloride flush, acetaminophen **OR** acetaminophen, polyethylene glycol, promethazine **OR** ondansetron      Assessment:     36 yo man with hx IVDU - opiate use, HCV, hx skin / soft tissue infection     Admit [facility] 9/23-26 with L5/S1 diskitis, vert OM, cutaneous abscess MRSA, BC no growth. Left AMA  Admit BN 10/8-14, BC no growth, IR bx neg, discharged on po linezolid.     Pt presents with worsening LBP. No fever / chills. Presented to [facility], afebrile, WBC 17.9  MRI with L5/S1 endplate erosion, disk fluid, vertebral edema, epidural change / poss abscess. Transferred to [facility], had IR biopsy.   Started on vancomycin / cefepime  Seen by Neurosurg, no surg intervention recommended      IMP/  L5/S1 diskitis / vert OM / epidural infection  BC no growth - 11/24    Plan:     Cont vancomycin + cefepime for now  Will f/u on BC, biopsy cultures (no growth to date  Will review MRIs with Radiologist    [id number] [address] Roberson for PICC     Pain control and withdrawal per Hospitalist  Will need prolonged course iv antibiotics in supervised setting - pt agreeable, discussed     See ILIA     Discussed with pt, Discharge Shreyas Greene MD    INFUSION ORDERS:  Vancomycin 1.5 gm iv q 12 through 1/20/21 (8 weeks)  - First dose given in hospital  - PICC   - Disposition / date discharge  - Check CBC w diff, CMP, ESR, CRP, vancomycin trough every Mon or Tue - FAX result to [phone]  - Call with antibiotic / infusion issues, [phone]  - Call with any change in status, transfer out of facility or to hospital - [phone]  - No f/u in outpatient ID office necessary

## 2020-11-27 NOTE — PLAN OF CARE
Problem: Pain:  Goal: Control of chronic pain  Description: Control of chronic pain  11/26/2020 2141 by Trixie Zegn RN  Outcome: Ongoing  Note: Pt reports symptoms of pain r/t chronic back discomfort  RN implemented non-pharmacological pain interventions to decrease patients pain level.   After re-assessment patients pain level is 8    See MAR for intervention

## 2020-11-27 NOTE — DISCHARGE INSTR - COC
Continuity of Care Form    Patient Name: Nadia Israel   :  1980  MRN:  [id number]    Admit date:  2020  Discharge date:  ***    Code Status Order: Full Code   Advance Directives:   [facility] Documentation       Date/Time Healthcare Directive Type of Healthcare Directive Copy in [facility] [address] Agent's Name Healthcare Agent's Phone Number    20 0541  No, patient does not have an advance directive for healthcare treatment -- -- -- -- --            Admitting Physician:  Mazin Blake MD  PCP: JORDY Stevens - CNP    Discharging Nurse: [facility] Unit/Room#: 3306/3306-01  Discharging Unit Phone Number: ***    Emergency Contact:   Extended Emergency Contact Information  Primary Emergency Contact: [address] [place] [address] Phone: [phone]  Relation: Parent   needed? No  Secondary Emergency Contact: [facility] Phone: [phone]  Relation: Spouse   needed? No    Past Surgical History:  Past Surgical History:   Procedure Laterality Date    APPENDECTOMY      IR PERC ASPIRATION INTERVERT [address]  2020    IR PERC ASPIRATION INTERVERT [address] 2020 [facility] SPECIAL PROCEDURES    SHOULDER SURGERY Left 14    UPPER GASTROINTESTINAL ENDOSCOPY         Immunization History: There is no immunization history for the selected administration types on file for this patient.     Active Problems:  Patient Active Problem List   Diagnosis Code    Psychoactive substance-induced organic mood disorder (HCC) F19.94    Ferguson's esophagus with dysplasia K22.719    Gastroesophageal reflux disease without esophagitis K21.9    Hepatitis C virus infection without hepatic coma B19.20    Acute respiratory failure ([address] J96.00    IVDU (intravenous drug user) F19.90    Acute metabolic encephalopathy R64.09    Unresponsive R41.89    Polysubstance abuse ([address] F19.10    Intentional drug overdose ([address] T50.902A    Disorder of electrolytes E87.8    Leukocytosis D72.829    Depression F32.9    Osteomyelitis of vertebra, lumbosacral region (HCC) M46.27    Tobacco abuse Z72.0    Epidural abscess G06.2    Mild malnutrition (HCC) E44.1    Discitis of lumbosacral region M46.47       Isolation/Infection:   Isolation            No Isolation          Patient Infection Status       Infection Onset Added Last Indicated Last Indicated By Review Planned Expiration Resolved Resolved By    None active    Resolved    COVID-19 Rule Out 11/24/20 11/24/20 11/24/20 COVID-19 (Ordered)   11/24/20 Rule-Out Test Resulted    MRSA 09/25/20 09/27/20 09/25/20 Culture, Anaerobic and Aerobic   11/25/20 Shaheed Delarosa RN            Nurse Assessment:  Last Vital Signs: /77   Pulse 64   Temp 97.8 °F (36.6 °C)   Resp 16   Ht 5' 9\" (1.753 m)   Wt 141 lb 15.6 oz (64.4 kg)   SpO2 98%   BMI 20.97 kg/m²     Last documented pain score (0-10 scale): Pain Level: 6  Last Weight:   Wt Readings from Last 1 Encounters:   11/25/20 141 lb 15.6 oz (64.4 kg)     Mental Status:  oriented    IV Access:  - PICC - site  R Upper Arm, insertion date: ***    Nursing Mobility/ADLs:  Walking   Independent  Transfer  Independent  Bathing  Independent  Dressing  Independent  [address]  Med Delivery   whole    Wound Care Documentation and Therapy:        Elimination:  Continence:   · Bowel: Yes  · Bladder: Yes  Urinary Catheter: None   Colostomy/Ileostomy/Ileal Conduit: No       Date of Last BM: 11/28/20    Intake/Output Summary (Last 24 hours) at 11/27/2020 1016  Last data filed at 11/27/2020 0400  Gross per 24 hour   Intake 960 ml   Output 900 ml   Net 60 ml     I/O last 3 completed shifts:   In: 12 [P.O.:960]  Out: 900 [Urine:900]    Safety Concerns:     None    Impairments/Disabilities:      None    Nutrition Therapy:  Current Nutrition Therapy:   - Oral Diet:  General    Routes of listed and that he requires [place] for greater 30 days. Update Admission H&P: No change in H&P. Pls see DC summary.      PHYSICIAN SIGNATURE:  Electronically signed by Corine Antunez MD on 11/28/20 at 9:09 AM EST

## 2020-11-27 NOTE — PLAN OF CARE
Problem: Pain:  Description: Pain management should include both nonpharmacologic and pharmacologic interventions. Goal: Control of acute pain  Description: Control of acute pain  Outcome: Ongoing  Note: Patient complaining of severe lower back pain due to epidural abscess. Taking morphine PRN as ordered, see MAR. Patient states that the morphine isn't helping and is requesting more pain medicine. Dr. Jose Elias Avelar via perfect serve regarding the patients request. Will continue to monitor. Problem: Skin Integrity:  Goal: Will show no infection signs and symptoms  Description: Will show no infection signs and symptoms  Outcome: Ongoing  Note: Patient has scattered abrasions. No signs or symptoms of infection.

## 2020-11-27 NOTE — PROGRESS NOTES
HOSPITAL MEDICINE  - PROGRESS NOTE    Admit Date: 11/25/2020         Interval History:  37 y.o. male,ongoing IV Drug abuse ,CAD,Hep C ,MRSA arm abscess who presents as a transfer from [facility] this am,with multiple symptoms. - admitted with back pain with recent diagnosis of  Osteomyelitis-   No  bowel incontinence, describes some urinary incontinence no saddle anesthesia.   -No numbness or weakness of his legs     Subjective:  Complains of back pain and asking for pain meds  -tells me his mom called to say a SNF accepted him. ? ? Casemanager not aware of this-will confirm    Objective:Afebrile    Diet: DIET GENERAL;  Dietary Nutrition Supplements: Standard High Calorie Oral Supplement, Frozen Oral Supplement  Pain is: Moderate  Nausea: Moderate  Bowel Movement/Flatus yes    Data:   Scheduled Meds: Reviewed  Continuous Infusions:    Intake/Output Summary (Last 24 hours) at 11/27/2020 1420  Last data filed at 11/27/2020 1021  Gross per 24 hour   Intake 720 ml   Output 900 ml   Net -180 ml     CBC:   Recent Labs     11/26/20  0640   WBC 9.5   HGB 12.6*        BMP:  Recent Labs     11/26/20  0640      K 4.3      CO2 23   BUN 17   CREATININE 0.9   GLUCOSE 103*     ABGs:   Lab Results   Component Value Date    PHART 7.357 12/06/2019    PO2ART 104.8 12/06/2019    ENJ6PBO 44.0 12/06/2019     INR:   Recent Labs     11/25/20  1156   INR 0.97       Objective:   Vitals: /77   Pulse 64   Temp 97.8 °F (36.6 °C)   Resp 16   Ht 5' 9\" (1.753 m)   Wt 141 lb 15.6 oz (64.4 kg)   SpO2 98%   BMI 20.97 kg/m²   General appearance appears stated age and cooperative  Skin: Skin color, texture, turgor normal.   HEENT: Head: Normocephalic, no lesions, without obvious abnormality.   Neck: supple  Lungs: clear to auscultation bilaterally  Heart: regular rate and rhythm, S1, S2 normal, no murmur, click, rub or gallop  Abdomen: soft, non-tender; bowel sounds normal; no masses,  no organomegaly  Extremities: extremities normal, atraumatic, no cyanosis or edema    Neurologic: Mental status: sleepy      Assessment & Plan:        Sepsis sec to Lumbosacral discitis / osteomyelitis with radiculopathy: involving L5-S1   Was [facility]23[facility] 10/8-10/14-for same issue  Neg bx at [facility] been on antibx prior   ID Consult,Neurosurg  Input appreciated-no OR plans  -Per ID-Vancomycin 1.5 gm iv q 12 through 1/20/21 (8 weeks)  Pain control.     IVDU:IV heroin and Meth  -ongoing use  -COWS protocol started. -multiple UE skin lesions  -wound care   -While at [facility],was caught on 10/11 injecting heroin in bathroom          Depression :   - Hx of  Psychoactive substance-induced organic mood disorder   mood stable.    -Continue home meds-seroquel,zyprexa,welbutrin,buspirone        Ferguson's esophagus with dysplasia  - on PPI        Hepatitis C virus infection without hepatic coma  -Outpt follow up for eval of tx.  -negative recent HIV     Hx of MRSA abscess- contact isolation        Disposition:1-2  Days ONCE A SNF accepts him-discussed with     Gaston Maxewll MD

## 2020-11-27 NOTE — PROGRESS NOTES
provided:  -- Sepsis, present on admission secondary to discitis/osteomyelitis L5-S1 with   epidural abscess  -- No Sepsis, discitis/osteomyelitis L5-S1 with epidural abscess only  -- Other - I will add my own diagnosis  -- Disagree - Not applicable / Not valid  -- Disagree - Clinically unable to determine / Unknown  -- Refer to Clinical Documentation Reviewer    PROVIDER RESPONSE TEXT:    This patient has sepsis which was present on admission secondary to   discitis/osteomyelitis L5-S1 with epidural abscess. Query created by:  Adam Goldberg on 11/27/2020 12:09 PM      Electronically signed by:  Constantino Mensah MD 11/27/2020 2:17 PM

## 2020-11-27 NOTE — PROGRESS NOTES
Clinical Pharmacy Progress Note    Admit date: 11/25/2020     Subjective/Objective:  Pt is a 43yom with PMHx that includes Hep C, polysubstance abuse, GERD, depression, and PTSD who was first admitted to [facility] on 9/23/20 with L5-S1 osteomyelitis / discitis - CT drainage was unable to be completed due to pain, but an arm abscess grew MRSA. Pt was treated with Cefepime & Vanc during admission. Pt then left AMA on 9/26 without antibiotic therapy. Pt then was admitted to [address] on 10/8/20, again with L5-Sa discitis / osteomyelitis. Pt was treated with Cefepime & Vancomycin during admission, and had a lumbar biopsy (culture resulted with no growth). During that admission, blood cultures on 10/8 were positive for methicillin-resistant Staph epidermidis. Pt was discharged home on 10/14/20 with Linezolid x4 weeks per ID recommendations. Pt states he stopped taking the Linezolid due to concerns of an interaction with his Bupropion. Pt saw infectious disease in the office on 11/18, and the plan was to start outpt infusions of Dalbavancin (weekly x2 doses), but [facility] outpt infusion center was never able to reach patient to schedule the infusion appointment. Pt now presents for continued treatment of his L5-S1 discitis / osteomyelitis. Interval update:  IR biopsy done 11/25 - cultures with no growth thus far. Pharmacy is consulted to dose Vancomycin per Dr. Kendy Naik    Current antibiotics:  Cefepime 2g IV q12h - day #4  Vancomycin - Pharmacy to dose - day #4   1g IV q12h (11/24-11/25)   1.5g IV q12h (11/25 - current)    During admission at [facility] in September, pt was on Vancomycin 1g IV q8h. Pt left before levels could be obtained. At [facility] in October, pt was on 1.5g IV q12h with levels appropriate per AUC dosing. SCr 0.9-1.1 at that time.     Recent Labs     11/24/20  1600 11/26/20  0640   * 140   K 4.2 4.3    109   CO2 23 23   BUN 20 17   CREATININE 1.2 0.9   GLUCOSE 85 103* Estimated Creatinine Clearance: 98 mL/min (based on SCr of 0.9 mg/dL). Lab Results   Component Value Date    WBC 9.5 11/26/2020    HGB 12.6 (L) 11/26/2020    HCT 38.0 (L) 11/26/2020    MCV 87.0 11/26/2020     11/26/2020       Lab Results   Component Value Date    PROTIME 11.3 11/25/2020    INR 0.97 11/25/2020       Height:  5' 9\" (175.3 cm)  Weight:  141 lb 15.6 oz (64.4 kg)    Vancomycin Levels:  Trough  11/27 @ 07:30 = ordered, but not drawn prior to dose being hung this AM.  Level canceled. Trough  11/28 @ 07:30 = ordered    Culture results:  SARS-CoV-2 (11/24) = negative  Blood (11/24) = No growth to date  Disc aspirate (11/25) = No growth to date    Prophylaxis:  VTE:  Enoxaparin  GI:  Not indicated      Assessment/Plan:  1)  L5-S1 discitis / osteomyelitis:  Cefepime + Vancomycin - day #4  · Cefepime -   Current dose remains appropriate based on indication and current renal function. Will continue to monitor and adjust as needed per [facility] Renal Dose Adjustment Policy. · Vancomycin - Pharmacy to dose  · Continue 1.5g IV q12h. · Trough was ordered to be drawn this AM, but this was not drawn prior to dose being hung. Level canceled. · Will re-order trough to be done prior to dose due tomorrow AM, 11/27 @ 08:00.  · Clinical condition will be closely monitored and clinical pharmacist will follow up in the morning.      Please call with questions--  Thanks--  Kristen Hay, RazD, [address] [facility]  [phone] or [id number] ([facility])   11/27/2020 12:12 PM

## 2020-11-27 NOTE — PROGRESS NOTES
Report called to receiving nurse Jose Hernandez. Patient to be transported to [address] [place] [address].

## 2020-11-27 NOTE — PROGRESS NOTES
4 Eyes Admission Assessment     I agree as the admission nurse that 2 RN's have performed a thorough Head to Toe Skin Assessment on the patient. ALL assessment sites listed below have been assessed on admission. Areas assessed by both nurses:   [x]   Head, Face, and Ears   [x]   Shoulders, Back, and Chest  [x]   Arms, Elbows, and Hands   [x]   Coccyx, Sacrum, and Ischium  [x]   Legs, Feet, and Heels        Does the Patient have Skin Breakdown?      Abrasions, lacerations, epidural spinal site lower back        Hua Prevention initiated:  NA   Wound Care Orders initiated:  NA      WOC nurse consulted for Pressure Injury (Stage 3,4, Unstageable, DTI, NWPT, and Complex wounds) or Hua score 18 or lower:  NA      Nurse 1 eSignature: Electronically signed by Martha Bustamante RN on 11/26/20 at 9:36 PM EST    **SHARE this note so that the co-signing nurse is able to place an eSignature**    Nurse 2 eSignature: Electronically signed by Gris Schulte RN on 11/26/20 at 10:11 PM EST

## 2020-11-27 NOTE — CARE COORDINATION
Case Management Daily Note                    Date: 11/27/2020     Patient Name: Jose Francisco Jacome    Date of Admission: 11/25/2020  4:51 AM  YOB: 1980    Length of Stay: 2         Patient Admission Status: Inpatient  Diagnosis:Epidural abscess [G06.2]     ________________________________________________________________________________________  Discharge Plan: SNF: Referrals Pending   Transient, staying with friends. Insurance: Payor: Flaca Lockwood / Plan: [employer] / Product Type: *No Product type* /   Is pre-cert/notification needed: Waived at this time. Tentative discharge date: Weekend for early next week    Current barriers: Final IV ABX plan, Placement. Referrals completed: SNF: [facility], [place], [facility], [facility] (per patient's request)     Resources/ information provided: SNF List   ________________________________________________________________________________________  PT AM-PAC:   / 24 per last evaluation on: [facility]    OT AM-PAC:   / 24 per last evaluation on: [facility]    DME Needs for discharge: N/A   ________________________________________________________________________________________  Notes/Plan of Care:   SW met with patient at bedside on this date. Patient was honest about his usage and was open that he did not have a place to live. He was homeless and staying with friends and people he knew. He was tearful. Patient expressed that he would be agreeable to treatment and agreeable to placement. Patient also requested a referral to [facility] in [facility] as he knew someone there. SW placed that and other referrals. UPDATE: 2:47 pm. Final ID recommendations known. RHIANNON received call from Dr. Goldman Silence reporteded patient is medically ready and he is stilling her he has been accepted somewhere.  RHIANNON informed her that is not currently known at this time    SW met with patient at bedside and spoke with mother on phone. She reported she has been in contact with [facility] and they are willing to review him for possible admissions. SW was given fax number [phone] for referral. RHIANNON faxed referral for review. RHIANNON placed follow up call to [facility] at 2:48 pm [phone]. SW was given alternate number. [phone] and spoke with [facility]. They could not accept as patient has to be clean at least 30 days documented use 10/11/2020 as well. SW updated patient they are unable to accept. He stated he has not used Heroin since [place], but has used Meth last time as last week. He says he wants to get help. He can stay at his \"lanie's place\" anymore because they all just keep using. RHIANNON noted [facility] has been unable to accept due to ability to meets patient's needs at this time during [facility]. RHIANNON placed call to [facility] per patient request [phone](FACVD). They have a bed possible on Tuesday and devon be willing to look at patient Monday. Asked for SW to follow up at that time. Ally Canela and/or his family were provided with choice of provider; he and/or his family are in agreement with the discharge plan at this time.     Care Transition Patient: LUIS Gray  [facility]    Case Management Department  Ph: [phone]

## 2020-11-27 NOTE — PROCEDURES
PICC line education:    -Risks  -Benefits  -Alternatives  -Procedure    Discussed the above with patient, verbalized understanding, answered all questions. Provided with information on PICC care to review. PICC tip verified via [employer] (Ok to use). Reported off to patient's  Nurse Robina      Single lumen Picc line in RT. Basilic vein with 37 cm inserted and 0 cm exposed. Ginger Hoover

## 2020-11-27 NOTE — PROGRESS NOTES
Pt arrived from 6S to 3S room 3306 he is alert and oriented x 4   Vital signs stable  Skin assessment completed  Call light and belonging within reach  Safety precautions in place  Pt oriented to unit and updated on care plan/status

## 2020-11-27 NOTE — PROGRESS NOTES
Physician Progress Note      Addy Cornejo  [facility] #:                  [id number]  :                       1980  ADMIT DATE:       2020 4:51 AM  DISCH DATE:  RESPONDING  PROVIDER #:        Selena Cox MD          QUERY TEXT:    Pt admitted with diskitis and osteomyelitis with suspected epidural abscess,   current heroin use. Noted documentation of Mild malnutrition by ordered   Dietary consultant. If possible, please document in progress notes and   discharge summary:    The medical record reflects the following:  Risk Factors: IVDU  Clinical Indicators: Per RD and ASPEND guidelines: down 7% x1 yr, Intake: Less   than/equal to 75% of estimated energy requirements > 7 days, Weight Changes   -7%, 10 lb x 1 yr;  BMI: 20.97  Treatment: ONS daily, RD monitoring  Options provided:  -- Mild malnutrition confirmed  -- Mild malnutrition ruled out  -- Other - I will add my own diagnosis  -- Disagree - Not applicable / Not valid  -- Disagree - Clinically unable to determine / Unknown  -- Refer to Clinical Documentation Reviewer    PROVIDER RESPONSE TEXT:    The diagnosis of Mild malnutrition was confirmed. Query created by: Sandie Olvera on 2020 4:30 PM      QUERY TEXT:    Dear Attending Provider,    Patient admitted with discitis/osteomyelitis L5-S1, and Epidural abscess. Pt   noted to have SIRS on admission: WBC: 17.9, RR: 28 . If possible, please   document in the progress notes and discharge summary if you are evaluating and   /or treating any of the following: The medical record reflects the following:  Risk Factors: diskitis/osteomyelitis L5-S1 with epidural abscess, Several   admissions where he left AMA  Clinical Indicators: SIRS on admission: WBC: 17.9, RR: 28; Per Neurosurgery   c/s \"discitis/osteomyelitis with a suspected small epidural abscess\";  Per ID   c/s \"L5/S1 diskitis / vert OM / epidural infection\"  Treatment: Cefepime, Vanc  Options provided:  -- Sepsis, present on admission secondary to discitis/osteomyelitis L5-S1 with   epidural abscess  -- No Sepsis, discitis/osteomyelitis L5-S1 with epidural abscess only  -- Other - I will add my own diagnosis  -- Disagree - Not applicable / Not valid  -- Disagree - Clinically unable to determine / Unknown  -- Refer to Clinical Documentation Reviewer    PROVIDER RESPONSE TEXT:    This patient has sepsis which was present on admission secondary to   discitis/osteomyelitis L5-S1 with epidural abscess. Query created by:  Young Mercer on 11/27/2020 12:09 PM      Electronically signed by:  Geoffrey Villeda MD 11/27/2020 2:17 PM

## 2020-11-27 NOTE — CARE COORDINATION
Marv Hutchins ([phone]). from [facility] called. Could possibly accept at [facility] location. Asked for clinicals and identifies to run benefit and full screen. Will call back  with update. UPDATE: 5:00 pm They can accept patient at [facility] but waiting on clinicals to return and need patient to sign and agree to drug policy. Marv Hutchins e-mailed policy to . [place] [facility])    Joanne Dial, [address]  Report: [phone]  Fax: [phone]    HENS: [phone]     UPDATE: 5:21 pm  Patient agreeable to discharge. Signed substance abuse policy.  e-faxed back to facility.  Plan for discharge tomorrow AM.     LUIS Haskins, [place]  Social Work/Case Management  [facility]   [phone]

## 2020-11-28 VITALS
RESPIRATION RATE: 18 BRPM | DIASTOLIC BLOOD PRESSURE: 70 MMHG | BODY MASS INDEX: 21.03 KG/M2 | OXYGEN SATURATION: 96 % | TEMPERATURE: 98.1 F | HEIGHT: 69 IN | WEIGHT: 141.98 LBS | SYSTOLIC BLOOD PRESSURE: 125 MMHG | HEART RATE: 67 BPM

## 2020-11-28 LAB
BLOOD CULTURE, ROUTINE: NORMAL
C-REACTIVE PROTEIN: 16.9 MG/L (ref 0–5.1)
CULTURE, BLOOD 2: NORMAL
VANCOMYCIN TROUGH: 14.6 UG/ML (ref 10–20)

## 2020-11-28 PROCEDURE — 2580000003 HC RX 258: Performed by: INTERNAL MEDICINE

## 2020-11-28 PROCEDURE — 6370000000 HC RX 637 (ALT 250 FOR IP): Performed by: INTERNAL MEDICINE

## 2020-11-28 PROCEDURE — 36592 COLLECT BLOOD FROM PICC: CPT

## 2020-11-28 PROCEDURE — 80202 ASSAY OF VANCOMYCIN: CPT

## 2020-11-28 PROCEDURE — 6360000002 HC RX W HCPCS: Performed by: INTERNAL MEDICINE

## 2020-11-28 RX ORDER — DICYCLOMINE HCL 20 MG
20 TABLET ORAL EVERY 6 HOURS PRN
Qty: 120 TABLET | Refills: 3 | Status: SHIPPED | OUTPATIENT
Start: 2020-11-28 | End: 2021-01-06

## 2020-11-28 RX ORDER — NICOTINE 21 MG/24HR
1 PATCH, TRANSDERMAL 24 HOURS TRANSDERMAL DAILY
Qty: 30 PATCH | Refills: 3 | Status: SHIPPED | OUTPATIENT
Start: 2020-11-28 | End: 2021-01-06

## 2020-11-28 RX ORDER — NICOTINE 21 MG/24HR
1 PATCH, TRANSDERMAL 24 HOURS TRANSDERMAL DAILY
Status: DISCONTINUED | OUTPATIENT
Start: 2020-11-28 | End: 2020-11-28 | Stop reason: HOSPADM

## 2020-11-28 RX ORDER — LANOLIN ALCOHOL/MO/W.PET/CERES
3 CREAM (GRAM) TOPICAL NIGHTLY
Qty: 30 TABLET | Refills: 3 | Status: SHIPPED | OUTPATIENT
Start: 2020-11-28

## 2020-11-28 RX ORDER — POLYETHYLENE GLYCOL 3350 17 G/17G
17 POWDER, FOR SOLUTION ORAL DAILY PRN
Qty: 527 G | Refills: 1 | Status: SHIPPED | OUTPATIENT
Start: 2020-11-28 | End: 2020-12-28

## 2020-11-28 RX ADMIN — VANCOMYCIN HYDROCHLORIDE 1500 MG: 10 INJECTION, POWDER, LYOPHILIZED, FOR SOLUTION INTRAVENOUS at 08:53

## 2020-11-28 RX ADMIN — CEFEPIME 2 G: 2 INJECTION, POWDER, FOR SOLUTION INTRAMUSCULAR; INTRAVENOUS at 07:11

## 2020-11-28 RX ADMIN — GABAPENTIN 300 MG: 300 CAPSULE ORAL at 08:50

## 2020-11-28 RX ADMIN — PANTOPRAZOLE SODIUM 40 MG: 40 TABLET, DELAYED RELEASE ORAL at 07:11

## 2020-11-28 RX ADMIN — FOLIC ACID 1 MG: 1 TABLET ORAL at 08:49

## 2020-11-28 RX ADMIN — IBUPROFEN 800 MG: 400 TABLET, FILM COATED ORAL at 08:52

## 2020-11-28 RX ADMIN — GABAPENTIN 300 MG: 300 CAPSULE ORAL at 12:12

## 2020-11-28 RX ADMIN — ACETAMINOPHEN 650 MG: 325 TABLET ORAL at 12:12

## 2020-11-28 ASSESSMENT — PAIN SCALES - GENERAL
PAINLEVEL_OUTOF10: 0
PAINLEVEL_OUTOF10: 4
PAINLEVEL_OUTOF10: 5

## 2020-11-28 NOTE — CARE COORDINATION
Case Management Assessment            Discharge Note                    Date / Time of Note: 11/28/2020 10:21 AM                  Discharge Note Completed by: Zoe Merida    Patient Name: Keiko Antunez   YOB: 1980  Diagnosis: Epidural abscess [G06.2]   Date / Time: 11/25/2020  4:51 AM    Current PCP: JORDY Massey - CNP  Clinic patient: No    Hospitalization in the last 30 days: No    Advance Directives:  Code Status: Full Code  [place] DNR form completed and on chart: Not Indicated    Financial:  Payor: Vandana Alfaro / Plan: Harvey Parnell / Product Type: *No Product type* /      Pharmacy:    [address] [employer], [address] [phone]  [address] [facility],[address]  Phone: [phone] Fax: [phone]    [facility] Outpatient Ph - Gustavo Query [address] [facility] [phone] - F [phone]  [facility] [address] [place] [address]  Phone: [phone] Fax: [phone]      Assistance purchasing medications?: Potential Assistance Purchasing Medications: No  Assistance provided by Case Management: None at this time    Does patient want to participate in local refill/ meds to beds program?: No    Meds To Beds General Rules:  1. Can ONLY be done Monday- Friday between 8:30am-5pm  2. Prescription(s) must be in pharmacy by 3pm to be filled same day  3. Copy of patient's insurance/ prescription drug card and patient face sheet must be sent along with the prescription(s)  4. Cost of Rx cannot be added to hospital bill. If financial assistance is needed, please contact unit  or ;  or  CANNOT provide pharmacy voucher for patients co-pays  5.  Patients can then  the prescription on their way out of the hospital at discharge, or pharmacy can deliver to the bedside if staff is available. (payment due at time of pick-up or delivery - cash, check, or card

## 2020-11-28 NOTE — PLAN OF CARE
Pain/discomfort being managed with PRN analgesics per MD orders. Pt able to express presence and absence of pain and rate pain appropriately using numerical scale. Pt free from falls this shift. Fall precautions in place at all times. Call light always within reach. Pt able and agreeable to contact for safety appropriately. Patient remains free of any signs of central line infection at this time.

## 2020-11-28 NOTE — PROGRESS NOTES
Clinical Pharmacy Progress Note    Admit date: 11/25/2020     Subjective/Objective:  Pt is a 43yom with PMHx that includes Hep C, polysubstance abuse, GERD, depression, and PTSD who was first admitted to [facility] on 9/23/20 with L5-S1 osteomyelitis / discitis - CT drainage was unable to be completed due to pain, but an arm abscess grew MRSA. Pt was treated with Cefepime & Vanc during admission. Pt then left AMA on 9/26 without antibiotic therapy. Pt then was admitted to [address] on 10/8/20, again with L5-Sa discitis / osteomyelitis. Pt was treated with Cefepime & Vancomycin during admission, and had a lumbar biopsy (culture resulted with no growth). During that admission, blood cultures on 10/8 were positive for methicillin-resistant Staph epidermidis. Pt was discharged home on 10/14/20 with Linezolid x4 weeks per ID recommendations. Pt states he stopped taking the Linezolid due to concerns of an interaction with his Bupropion. Pt saw infectious disease in the office on 11/18, and the plan was to start outpt infusions of Dalbavancin (weekly x2 doses), but [facility] outpt infusion center was never able to reach patient to schedule the infusion appointment. Pt now presents for continued treatment of his L5-S1 discitis / osteomyelitis. Interval update:  IR biopsy done 11/25 - cultures with no growth thus far. Pharmacy is consulted to dose Vancomycin per Dr. Azra Pelaez    Current antibiotics:  Cefepime 2g IV q12h - day #5  Vancomycin - Pharmacy to dose - day #5   1g IV q12h (11/24-11/25)   1.5g IV q12h (11/25 - current)    During admission at [facility] in September, pt was on Vancomycin 1g IV q8h. Pt left before levels could be obtained. At [facility] in October, pt was on 1.5g IV q12h with levels appropriate per AUC dosing. SCr 0.9-1.1 at that time.     Recent Labs     11/26/20  0640      K 4.3      CO2 23   BUN 17   CREATININE 0.9   GLUCOSE 103*       Estimated Creatinine Clearance: 98 mL/min (based on SCr of 0.9 mg/dL). Lab Results   Component Value Date    WBC 9.5 11/26/2020    HGB 12.6 (L) 11/26/2020    HCT 38.0 (L) 11/26/2020    MCV 87.0 11/26/2020     11/26/2020       Lab Results   Component Value Date    PROTIME 11.3 11/25/2020    INR 0.97 11/25/2020       Height:  5' 9\" (175.3 cm)  Weight:  141 lb 15.6 oz (64.4 kg)    Vancomycin Levels:  Trough  11/27 @ 07:30 = ordered, but not drawn prior to dose being hung this AM.  Level canceled. Trough  11/28 @ 07:10 = 14.6 mcg/mL (10 hour level on 1.5g IV q12h)    Culture results:  SARS-CoV-2 (11/24) = negative  Blood (11/24) = No growth to date  Disc aspirate (11/25) = No growth to date    Prophylaxis:  VTE:  Enoxaparin  GI:  Not indicated      Assessment/Plan:  1)  L5-S1 discitis / osteomyelitis:  Cefepime + Vancomycin - day #5  · Cefepime -   Current dose remains appropriate based on indication and current renal function. Will continue to monitor and adjust as needed per [facility] Renal Dose Adjustment Policy. · Vancomycin - Pharmacy to dose  · Trough just below goal. Continue 1.5g IV q12h. · Repeat troughs will be ordered when appropriate. Target 15-20 mcg/mL. · Clinical condition will be closely monitored and clinical pharmacist will follow up in the morning.      Please call with questions--  Thanks--  Ludivina Maravilla, PharmD, BCPS  Wireless: [phone] [facility] pharmacy: [id number]  11/28/2020 9:31 AM

## 2020-11-28 NOTE — DISCHARGE SUMMARY
Hospital Discharge Summary    Patient's PCP: JORDY Gomez - CNP  Admit Date: 11/25/2020   Discharge Date: 11/28/2020    Admitting Physician: Dr. Yesenia Albrecht MD  Discharge Physician: Dr. aMry Dupree: Neurosurgery, ID    HPI: 37 y. o. male,ongoing IV Drug abuse ,CAD,Hep C ,MRSA arm abscess who presented as a transfer from [facility] ,with multiple symptoms. - admitted with back pain with recent diagnosis of  Osteomyelitis  No  bowel incontinence, describes some urinary incontinence no saddle anesthesia.   -No numbness or weakness of his legs        Sepsis sec to Lumbosacral discitis / osteomyelitis with radiculopathy: involving L5-S1   Was admitted 9/23-9/26 at [facility] 10/8-10/14-for same issue  Neg bx at [facility] been on antibx prior  ID Consulted  Neurosurgery consulted:   No emergent neurosurgical intervention indicated  If symptoms worsen then repeat MRI Lumbar w contrast and re-consult if any changes are seen on scan  -Per ID-Vancomycin 1.5 gm iv q 12 through 1/20/21 (8 weeks)  Pain control.       IVDU:IV heroin and Meth   -ongoing use  -COWS protocol started while hospitalized.   -multiple UE skin lesions  -wound care  -While at [facility],was caught on 10/11 injecting heroin in bathroom  - Will need mgt for substance abuse: Social works follow up recommended.            Depression :   - Hx of  Psychoactive substance-induced organic mood disorder   mood stable.   -Home meds-seroquel,zyprexa,welbutrin,buspirone          Ferguson's esophagus with dysplasia  - on PPI        Hepatitis C virus infection without hepatic coma  -Outpt follow up with GI for eval of tx.  -negative recent HIV       Hx of MRSA abscess- contact isolation            Discharge Diagnoses: As above        Physical Exam: /70   Pulse 67   Temp 98.1 °F (36.7 °C) (Oral)   Resp 18   Ht 5' 9\" (1.753 m)   Wt 141 lb 15.6 oz (64.4 kg)   SpO2 96%   BMI 20.97 kg/m²     No results for input(s): POCGLU in the last 72 hours. General appearance appears stated age and cooperative  Skin: Skin color, texture, turgor normal.   HEENT: Head: Normocephalic, no lesions, without obvious abnormality. Neck: supple  Lungs: clear to auscultation bilaterally  Heart: regular rate and rhythm, S1, S2 normal, no murmur, click, rub or gallop  Abdomen: soft, non-tender; bowel sounds normal; no masses,  no organomegaly  Extremities: extremities normal, atraumatic, no cyanosis or edema    LABS:  Recent Labs     11/26/20  0640   WBC 9.5   HGB 12.6*         Recent Labs     11/26/20  0640      K 4.3      CO2 23   BUN 17   CREATININE 0.9   GLUCOSE 103*     Recent Labs     11/25/20  1156   INR 0.97             Discharge Medications:   Julia Barb   Home Medication Instructions YUX:[id number]    Printed on:11/28/20 4405   Medication Information                      buPROPion (WELLBUTRIN SR) 100 MG extended release tablet  Take 100 mg by mouth 2 times daily             busPIRone (BUSPAR) 15 MG tablet  Take 15 mg by mouth 3 times daily as needed Indications: Feeling Anxious             dicyclomine (BENTYL) 20 MG tablet  Take 1 tablet by mouth every 6 hours as needed (Abdominal Cramping)             folic acid (FOLVITE) 1 MG tablet  Take 1 mg by mouth daily             gabapentin (NEURONTIN) 300 MG capsule  Take 300 mg by mouth 3 times daily.              ibuprofen (ADVIL;MOTRIN) 800 MG tablet  Take 800 mg by mouth every 6 hours as needed for Pain             melatonin 3 MG TABS tablet  Take 1 tablet by mouth nightly             nicotine (NICODERM CQ) 21 MG/24HR  Place 1 patch onto the skin daily             OLANZapine (ZYPREXA) 10 MG tablet  Take 1 tablet by mouth nightly             pantoprazole (PROTONIX) 40 MG tablet  Take 40 mg by mouth daily             polyethylene glycol (GLYCOLAX) 17 g packet  Take 17 g by mouth daily as needed for Constipation                Activity: activity as tolerated  Diet: regular diet  Wound Care: Local wound care    Disposition: SNF  Discharged Condition: Stable  Follow Up: Primary Care Physician in one week    Total time spent on discharge, finalizing medications, referrals and arranging outpatient follow up was more than 30 minutes    Thank you JORDY Georges - SRINIVASAN for the opportunity to be involved in this patients care. If you have any questions or concerns please feel free to contact me at [phone].

## 2020-11-28 NOTE — PROGRESS NOTES
Patient is A&O x3.  RA, sat 98%. No complaints of SOB. Medicated for c/o chronic lower back pain as needed. Respirations appear to easy and unlabored. Lungs clear. Respirations easy with no complaints of cough. No complaints of nausea/vomiting/diarrhea. Up ad anu to the bathroom/BSC as needed. Tolerating regular diet. Right arm PICC intact and flushed with brisk blood return noted. Plan of care and safety measures reviewed with the patient. Call light in reach and bed alarm in place. Will continue to monitor.   Electronically signed by Rachel Pyle RN on 11/27/2020 at 9:30 PM

## 2020-11-28 NOTE — PROGRESS NOTES
Patient in bed resting comfortably. Respirations steady and unlabored. No signs of respiratory or cardiac distress. No complaints of pain at this time. No needs at this time. Call light in reach. Will continue to monitor.   Electronically signed by Gladis Hutchinson RN on 11/28/2020 at 1:38 AM

## 2020-11-28 NOTE — PROGRESS NOTES
Labs as ordered drawn with sterile technique from the right arm PICC. Will continue to monitor.   Electronically signed by Anna Marie Olivares RN on 11/28/2020 at 7:15 AM

## 2020-12-01 ENCOUNTER — TELEPHONE (OUTPATIENT)
Dept: INFECTIOUS DISEASES | Age: 40
End: 2020-12-01

## 2020-12-01 NOTE — TELEPHONE ENCOUNTER
Spoke to pt's nurse- Faith Yang. She was able to get a hold of mother Toby Fontana, she stated that patient will not be returning to facility. He still has PICC line in place.

## 2020-12-01 NOTE — TELEPHONE ENCOUNTER
[facility] director at [facility] called to notify that patient left facility and has not returned. Pt went to nurses station last night crying stating that his son was in a bad car wreck. He told the nurses it was critical and that he had to go to the hospital right away. He never returned and facility has not been able to reach patient. Patent still has PICC line. Reached out to emergency contacts and no response yet.

## 2020-12-02 NOTE — TELEPHONE ENCOUNTER
Called and spoke to pt --    Pt with L5 / S1 diskitis. Discharged to 11/27 to [facility] on iv vancomycin  Left [facility] AMA 11/29    Pt has PICC. Pt has LBP. Working with Dr Alyssa Mario to get into facility in [address] per pt.   [facility]

## 2020-12-05 ENCOUNTER — HOSPITAL ENCOUNTER (INPATIENT)
Age: 40
LOS: 1 days | Discharge: LEFT BEFORE DISCHARGE | DRG: 812 | End: 2020-12-06
Attending: EMERGENCY MEDICINE | Admitting: INTERNAL MEDICINE
Payer: COMMERCIAL

## 2020-12-05 ENCOUNTER — APPOINTMENT (OUTPATIENT)
Dept: GENERAL RADIOLOGY | Age: 40
DRG: 812 | End: 2020-12-05
Payer: COMMERCIAL

## 2020-12-05 ENCOUNTER — APPOINTMENT (OUTPATIENT)
Dept: CT IMAGING | Age: 40
DRG: 812 | End: 2020-12-05
Payer: COMMERCIAL

## 2020-12-05 PROBLEM — R41.89 UNRESPONSIVE: Status: ACTIVE | Noted: 2020-12-05

## 2020-12-05 LAB
A/G RATIO: 1.1 (ref 1.1–2.2)
ALBUMIN SERPL-MCNC: 4.1 G/DL (ref 3.4–5)
ALP BLD-CCNC: 107 U/L (ref 40–129)
ALT SERPL-CCNC: 45 U/L (ref 10–40)
AMPHETAMINE SCREEN, URINE: POSITIVE
ANION GAP SERPL CALCULATED.3IONS-SCNC: 9 MMOL/L (ref 3–16)
AST SERPL-CCNC: 45 U/L (ref 15–37)
BARBITURATE SCREEN URINE: ABNORMAL
BASE EXCESS ARTERIAL: -1.1 MMOL/L (ref -3–3)
BASOPHILS ABSOLUTE: 0.1 K/UL (ref 0–0.2)
BASOPHILS RELATIVE PERCENT: 0.5 %
BENZODIAZEPINE SCREEN, URINE: POSITIVE
BILIRUB SERPL-MCNC: 0.4 MG/DL (ref 0–1)
BUN BLDV-MCNC: 21 MG/DL (ref 7–20)
CALCIUM SERPL-MCNC: 9.3 MG/DL (ref 8.3–10.6)
CANNABINOID SCREEN URINE: POSITIVE
CARBOXYHEMOGLOBIN ARTERIAL: 1.7 % (ref 0–1.5)
CHLORIDE BLD-SCNC: 102 MMOL/L (ref 99–110)
CO2: 26 MMOL/L (ref 21–32)
COCAINE METABOLITE SCREEN URINE: ABNORMAL
CREAT SERPL-MCNC: 0.9 MG/DL (ref 0.9–1.3)
EOSINOPHILS ABSOLUTE: 0.2 K/UL (ref 0–0.6)
EOSINOPHILS RELATIVE PERCENT: 1.9 %
GFR AFRICAN AMERICAN: >60
GFR NON-AFRICAN AMERICAN: >60
GLOBULIN: 3.6 G/DL
GLUCOSE BLD-MCNC: 92 MG/DL (ref 70–99)
HCO3 ARTERIAL: 25.5 MMOL/L (ref 21–29)
HCT VFR BLD CALC: 39.1 % (ref 40.5–52.5)
HEMOGLOBIN, ART, EXTENDED: 13.3 G/DL (ref 13.5–17.5)
HEMOGLOBIN: 12.7 G/DL (ref 13.5–17.5)
LYMPHOCYTES ABSOLUTE: 3.7 K/UL (ref 1–5.1)
LYMPHOCYTES RELATIVE PERCENT: 30.4 %
Lab: ABNORMAL
MCH RBC QN AUTO: 28.2 PG (ref 26–34)
MCHC RBC AUTO-ENTMCNC: 32.4 G/DL (ref 31–36)
MCV RBC AUTO: 87.1 FL (ref 80–100)
METHADONE SCREEN, URINE: ABNORMAL
METHEMOGLOBIN ARTERIAL: 0.5 %
MONOCYTES ABSOLUTE: 1 K/UL (ref 0–1.3)
MONOCYTES RELATIVE PERCENT: 8.4 %
NEUTROPHILS ABSOLUTE: 7.2 K/UL (ref 1.7–7.7)
NEUTROPHILS RELATIVE PERCENT: 58.8 %
O2 CONTENT ARTERIAL: 17 ML/DL
O2 SAT, ARTERIAL: 94.6 %
O2 THERAPY: ABNORMAL
OPIATE SCREEN URINE: ABNORMAL
OXYCODONE URINE: ABNORMAL
PCO2 ARTERIAL: 50.3 MMHG (ref 35–45)
PDW BLD-RTO: 15.7 % (ref 12.4–15.4)
PH ARTERIAL: 7.32 (ref 7.35–7.45)
PH UA: 6
PHENCYCLIDINE SCREEN URINE: ABNORMAL
PLATELET # BLD: 438 K/UL (ref 135–450)
PMV BLD AUTO: 7.9 FL (ref 5–10.5)
PO2 ARTERIAL: 75 MMHG (ref 75–108)
POTASSIUM SERPL-SCNC: 4.2 MMOL/L (ref 3.5–5.1)
PROCALCITONIN: 0.13 NG/ML (ref 0–0.15)
PROPOXYPHENE SCREEN: ABNORMAL
RBC # BLD: 4.49 M/UL (ref 4.2–5.9)
SARS-COV-2, NAAT: NOT DETECTED
SODIUM BLD-SCNC: 137 MMOL/L (ref 136–145)
TCO2 ARTERIAL: 27.1 MMOL/L
TOTAL CK: 586 U/L (ref 39–308)
TOTAL PROTEIN: 7.7 G/DL (ref 6.4–8.2)
WBC # BLD: 12.2 K/UL (ref 4–11)

## 2020-12-05 PROCEDURE — 6360000002 HC RX W HCPCS: Performed by: EMERGENCY MEDICINE

## 2020-12-05 PROCEDURE — 2580000003 HC RX 258: Performed by: INTERNAL MEDICINE

## 2020-12-05 PROCEDURE — 96372 THER/PROPH/DIAG INJ SC/IM: CPT

## 2020-12-05 PROCEDURE — 84145 PROCALCITONIN (PCT): CPT

## 2020-12-05 PROCEDURE — 99291 CRITICAL CARE FIRST HOUR: CPT

## 2020-12-05 PROCEDURE — 93005 ELECTROCARDIOGRAM TRACING: CPT | Performed by: EMERGENCY MEDICINE

## 2020-12-05 PROCEDURE — 2060000000 HC ICU INTERMEDIATE R&B

## 2020-12-05 PROCEDURE — 82803 BLOOD GASES ANY COMBINATION: CPT

## 2020-12-05 PROCEDURE — 6360000002 HC RX W HCPCS

## 2020-12-05 PROCEDURE — 71045 X-RAY EXAM CHEST 1 VIEW: CPT

## 2020-12-05 PROCEDURE — 85025 COMPLETE CBC W/AUTO DIFF WBC: CPT

## 2020-12-05 PROCEDURE — 82550 ASSAY OF CK (CPK): CPT

## 2020-12-05 PROCEDURE — 96374 THER/PROPH/DIAG INJ IV PUSH: CPT

## 2020-12-05 PROCEDURE — U0002 COVID-19 LAB TEST NON-CDC: HCPCS

## 2020-12-05 PROCEDURE — 80307 DRUG TEST PRSMV CHEM ANLYZR: CPT

## 2020-12-05 PROCEDURE — 70450 CT HEAD/BRAIN W/O DYE: CPT

## 2020-12-05 PROCEDURE — 96375 TX/PRO/DX INJ NEW DRUG ADDON: CPT

## 2020-12-05 PROCEDURE — 6360000002 HC RX W HCPCS: Performed by: INTERNAL MEDICINE

## 2020-12-05 PROCEDURE — 80053 COMPREHEN METABOLIC PANEL: CPT

## 2020-12-05 PROCEDURE — 6360000002 HC RX W HCPCS: Performed by: NURSE PRACTITIONER

## 2020-12-05 PROCEDURE — 87040 BLOOD CULTURE FOR BACTERIA: CPT

## 2020-12-05 RX ORDER — PANTOPRAZOLE SODIUM 40 MG/1
40 TABLET, DELAYED RELEASE ORAL DAILY
COMMUNITY
End: 2021-01-06

## 2020-12-05 RX ORDER — HEPARIN SODIUM 5000 [USP'U]/ML
5000 INJECTION, SOLUTION INTRAVENOUS; SUBCUTANEOUS EVERY 8 HOURS SCHEDULED
Status: DISCONTINUED | OUTPATIENT
Start: 2020-12-05 | End: 2020-12-06 | Stop reason: HOSPADM

## 2020-12-05 RX ORDER — PANTOPRAZOLE SODIUM 40 MG/10ML
40 INJECTION, POWDER, LYOPHILIZED, FOR SOLUTION INTRAVENOUS DAILY
Status: DISCONTINUED | OUTPATIENT
Start: 2020-12-05 | End: 2020-12-06 | Stop reason: HOSPADM

## 2020-12-05 RX ORDER — SODIUM CHLORIDE 0.9 % (FLUSH) 0.9 %
10 SYRINGE (ML) INJECTION PRN
Status: DISCONTINUED | OUTPATIENT
Start: 2020-12-05 | End: 2020-12-06 | Stop reason: HOSPADM

## 2020-12-05 RX ORDER — NALOXONE HYDROCHLORIDE 1 MG/ML
2 INJECTION INTRAMUSCULAR; INTRAVENOUS; SUBCUTANEOUS ONCE
Status: COMPLETED | OUTPATIENT
Start: 2020-12-05 | End: 2020-12-05

## 2020-12-05 RX ORDER — NALOXONE HYDROCHLORIDE 1 MG/ML
INJECTION INTRAMUSCULAR; INTRAVENOUS; SUBCUTANEOUS
Status: DISPENSED
Start: 2020-12-05 | End: 2020-12-06

## 2020-12-05 RX ORDER — SODIUM CHLORIDE 0.9 % (FLUSH) 0.9 %
10 SYRINGE (ML) INJECTION EVERY 12 HOURS SCHEDULED
Status: DISCONTINUED | OUTPATIENT
Start: 2020-12-05 | End: 2020-12-06 | Stop reason: HOSPADM

## 2020-12-05 RX ORDER — FOLIC ACID 1 MG/1
1 TABLET ORAL DAILY
COMMUNITY
End: 2021-01-06

## 2020-12-05 RX ORDER — LORAZEPAM 2 MG/ML
2 INJECTION INTRAMUSCULAR ONCE
Status: COMPLETED | OUTPATIENT
Start: 2020-12-05 | End: 2020-12-05

## 2020-12-05 RX ORDER — LINEZOLID 600 MG/1
600 TABLET, FILM COATED ORAL 2 TIMES DAILY
COMMUNITY
End: 2021-01-06

## 2020-12-05 RX ORDER — NICOTINE 21 MG/24HR
1 PATCH, TRANSDERMAL 24 HOURS TRANSDERMAL EVERY 24 HOURS
COMMUNITY
End: 2021-01-06

## 2020-12-05 RX ORDER — SODIUM CHLORIDE 9 MG/ML
INJECTION, SOLUTION INTRAVENOUS CONTINUOUS
Status: DISCONTINUED | OUTPATIENT
Start: 2020-12-05 | End: 2020-12-06 | Stop reason: HOSPADM

## 2020-12-05 RX ORDER — BUSPIRONE HYDROCHLORIDE 15 MG/1
15 TABLET ORAL 3 TIMES DAILY PRN
COMMUNITY
End: 2021-01-06

## 2020-12-05 RX ORDER — GABAPENTIN 300 MG/1
300 CAPSULE ORAL 3 TIMES DAILY
COMMUNITY
End: 2021-01-06

## 2020-12-05 RX ORDER — QUETIAPINE FUMARATE 100 MG/1
100 TABLET, FILM COATED ORAL NIGHTLY
COMMUNITY
End: 2021-01-06

## 2020-12-05 RX ORDER — MIDAZOLAM HYDROCHLORIDE 1 MG/ML
5 INJECTION INTRAMUSCULAR; INTRAVENOUS ONCE
Status: COMPLETED | OUTPATIENT
Start: 2020-12-05 | End: 2020-12-05

## 2020-12-05 RX ORDER — NALOXONE HYDROCHLORIDE 1 MG/ML
INJECTION INTRAMUSCULAR; INTRAVENOUS; SUBCUTANEOUS
Status: COMPLETED
Start: 2020-12-05 | End: 2020-12-05

## 2020-12-05 RX ORDER — IBUPROFEN 800 MG/1
800 TABLET ORAL EVERY 8 HOURS PRN
COMMUNITY

## 2020-12-05 RX ADMIN — NALOXONE HYDROCHLORIDE 2 MG: 1 INJECTION PARENTERAL at 12:07

## 2020-12-05 RX ADMIN — LORAZEPAM 2 MG: 2 INJECTION, SOLUTION INTRAMUSCULAR; INTRAVENOUS at 13:37

## 2020-12-05 RX ADMIN — MEROPENEM 1 G: 1 INJECTION, POWDER, FOR SOLUTION INTRAVENOUS at 23:48

## 2020-12-05 RX ADMIN — NALOXONE HYDROCHLORIDE 2 MG: 1 INJECTION INTRAMUSCULAR; INTRAVENOUS; SUBCUTANEOUS at 12:20

## 2020-12-05 RX ADMIN — SODIUM CHLORIDE: 9 INJECTION, SOLUTION INTRAVENOUS at 23:48

## 2020-12-05 RX ADMIN — MIDAZOLAM 5 MG: 1 INJECTION INTRAMUSCULAR; INTRAVENOUS at 12:28

## 2020-12-05 RX ADMIN — Medication 10 ML: at 23:51

## 2020-12-05 RX ADMIN — NALOXONE HYDROCHLORIDE 2 MG: 1 INJECTION PARENTERAL at 12:20

## 2020-12-05 SDOH — HEALTH STABILITY: MENTAL HEALTH: HOW OFTEN DO YOU HAVE A DRINK CONTAINING ALCOHOL?: NEVER

## 2020-12-05 ASSESSMENT — ENCOUNTER SYMPTOMS: TACHYPNEA: 1

## 2020-12-05 ASSESSMENT — PAIN SCALES - GENERAL: PAINLEVEL_OUTOF10: 7

## 2020-12-05 NOTE — ED NOTES
Staff assist to room due to patient attempting to get out of bed. Sitter remains at bedside. Patient was able to use a urinal to void and continues to be restless. Patient given Ativan per order and is now laying in bed with intermittent restlessness. Sitter remains at bedside for patient safety. Vital signs stable at this time. Will continue to monitor.       Reyes Collado RN  12/05/20 9043

## 2020-12-05 NOTE — ED NOTES
2mg of Narcan given intranasally without success. Jj Tidwell CNP at patient bedside. 2mg Narcan IV ordered to be placed through existing picc line.       Delfina Li RN  12/05/20 6085

## 2020-12-05 NOTE — ED NOTES
Patient's oxygen saturation increased to 96% on RA after 4mg Patient still unable to arouse at this time.       Rodri Logan RN  12/05/20 9652

## 2020-12-05 NOTE — H&P
Hospital Medicine History & Physical      PCP: No primary care provider on file. Date of Admission: 12/5/2020    Date of Service: Pt seen/examined on 12/5/2020 and Admitted to Inpatient with expected LOS greater than two midnights due to medical therapy. .    Chief Complaint: Unresponsive      History Of Present Illness:  (    38 y.o. male who presented to [facility] with above complaint. History is from mother. He has a history of infection in the back and was treated at [facility] in the recent past and discharged on 27 November to [facility]. They were not happy about the care over there and patient signed AMA on 30 November. He will left with PICC line. He has not been getting any antibiotics since then. Today he went for shopping with his mother. After using the bathroom he was found to be very altered mentally and could not answer appropriately. Patient was brought to the emergency room by his father in a private car. Mother told that he has a history of drug addiction and she is not sure what he took when he was in the bathroom. She denies seizure-like activity or any injury. She told she did not have any nausea vomiting diarrhea fever cough or shortness of breath prior to this event. Patient received one-time Narcan in the emergency room    Past Medical History:      Back infection. ,  Some heart problems    Past Surgical History:      No past surgical history on file. Medications Prior to Admission:      Prior to Admission medications    Not on File       Allergies:  Patient has no allergy information on record. Social History: History of drug addiction    The patient currently lives with mother    TOBACCO:   has no history on file for tobacco.  ETOH:   has no history on file for alcohol. E-Cigarettes Vaping or Juuling     Questions Responses    Vaping Use     Start Date     Does device contain nicotine?      Quit Date     Vaping Type             Family reviewed the EKG with the following interpretation: Normal sinus rhythm 71/min    CT HEAD WO CONTRAST   Final Result   No acute intracranial abnormality. XR CHEST PORTABLE   Final Result   No acute process. ASSESSMENT:    Active Hospital Problems    Diagnosis Date Noted   Renetta Rios [R41.89] 12/05/2020         PLAN:  Unresponsiveness-possibly drug overdose-urine tox shows amphetamine benzodiazepine and cannabis-mental status improving-admit, ABG, n.p.o. IV fluids, monitor vitals, IV Protonix, supportive care CPK  As improvement in  mental status ICU admission was downgraded to C4    History of back infection-currently has port-not quite sure about the antibiotics-unable to get from epic-infectious disease consult    Mild respiratory acidosis-monitor closely, consider repeating ABG if mental status worsen, intensivist consult was called from ER          DVT Prophylaxis: Lovenox  Diet: Diet NPO Effective Now  Code Status: Full Code    PT/OT Eval Status:     Dispo -admitted to progressive unit       Iliana Hickey MD    Thank you No primary care provider on file. for the opportunity to be involved in this patient's care. If you have any questions or concerns please feel free to contact me at [phone].

## 2020-12-05 NOTE — ED NOTES
Patients room air sats decreased to 88-89% 3 liters NC placed. sats increased to 98%.       Madhavi Pineda RN  12/05/20 4220

## 2020-12-05 NOTE — ED NOTES
Shaka [email or web address]  Re: admit.  Drug overdose per [email or web address] [email or web address]  12/05/20 1527

## 2020-12-05 NOTE — ED NOTES
Patient was suctioned with Yankauer tip to remove oral secretions. Patient gets restless periodically.       Parag Dela Cruz  12/05/20 1312

## 2020-12-05 NOTE — ED PROVIDER NOTES
EMERGENCY DEPARTMENT ENCOUNTER      This patient was seen and evaluated by the attending physician. Pt Name: Billy Teague  MRN: [id number]  [place] 1/1/1882  Date of evaluation: 12/5/2020  Provider: JORDY Jackson  PCP: No primary care provider on file. ED Attending: Roland Ya DO    History provided by patient mother    CHIEF COMPLAINT:     Chief Complaint   Patient presents with    Drug Overdose     was fine and then went to the bathroom and came out unresponsive but breathing       HISTORY OF PRESENT ILLNESS:      Billy Teague is a 78 y.o. adult who presents [facility]  ED with complaints of being unresponsive. Patient was pulled out of a car in the parking lot, patient mother states that patient went to the bathroom and then when he came out he was drowsy and then went unresponsive in the car. Patient with a long history of drug use according to the patient mother, patient mother left, patient was unresponsive unable to get any more history. Location:-  Quality:-  Severity:-  Duration:-  Modifying factors:-    Nursing Notes were reviewed     REVIEW OF SYSTEMS:     Review of Systems  Unable to obtain        PAST MEDICAL HISTORY:   No past medical history on file. SURGICAL HISTORY:    No past surgical history on file. CURRENT MEDICATIONS:       Previous Medications    No medications on file         ALLERGIES:    Patient has no allergy information on record. FAMILY HISTORY:     No family history on file.        SOCIAL HISTORY:       Social History     Socioeconomic History    Marital status: Not on file     Spouse name: Not on file    Number of children: Not on file    Years of education: Not on file    Highest education level: Not on file   Occupational History    Not on file   Social Needs    Financial resource strain: Not on file    Food insecurity     Worry: Not on file     Inability: Not on file   [place] [employer] needs Medical: Not on file     Non-medical: Not on file   Tobacco Use    Smoking status: Not on file   Substance and Sexual Activity    Alcohol use: Not on file    Drug use: Not on file    Sexual activity: Not on file   Lifestyle    Physical activity     Days per week: Not on file     Minutes per session: Not on file    Stress: Not on file   Relationships    Social connections     Talks on phone: Not on file     Gets together: Not on file     Attends Judaism service: Not on file     Active member of club or organization: Not on file     Attends meetings of clubs or organizations: Not on file     Relationship status: Not on file    Intimate partner violence     Fear of current or ex partner: Not on file     Emotionally abused: Not on file     Physically abused: Not on file     Forced sexual activity: Not on file   Other Topics Concern    Not on file   Social History Narrative    Not on file       SCREENINGS:            PHYSICAL EXAM:       ED Triage Vitals   BP Temp Temp Source Pulse Resp SpO2 Height Weight   12/05/20 1206 12/05/20 1130 12/05/20 1130 12/05/20 1130 12/05/20 1130 12/05/20 1130 12/05/20 1130 12/05/20 1130   115/67 97.7 °F (36.5 °C) Oral 63 12 100 % 5' 8\" (1.727 m) 180 lb (81.6 kg)       Physical Exam    CONSTITUTIONAL: Unresponsive male, breathing on his own. Vitals:    12/05/20 1246 12/05/20 1348 12/05/20 1354 12/05/20 1422   BP: 116/79  125/79 125/79   Pulse: 69 85 85 91   Resp: 27 24 24 24   Temp:       TempSrc:       SpO2: 96% 90% 98% 95%   Weight:       Height:         HENT: Normocephalic. Atraumatic. External ears normal, without discharge. TMs clear bilaterally. No nasal discharge. Oropharynx clear, no erythema. Mucous membranes moist.  EYES: Conjunctiva non-injected, no lid abnormalities noted. No scleral icterus. PERRL. EOM's grossly intact. Anterior chambers clear. NECK: Supple. Normal ROM. No meningismus. No thyroid tenderness or swelling noted. CARDIOVASCULAR: RRR.  No Murmer. PULMONARY/CHEST WALL: Effort normal. No tachypnea. Lungs clear to ausculation. ABDOMEN: Normal BS. Soft. Nondistended. No tenderness to palpate. No guarding. No hernias noted. No splenomegaly. Back: Spine is midline. No ecchymosis. No crepitus on palpation. No obvious subluxation of vertebral column. No saddle anesthesia or evidence of cauda equina. /ANORECTAL: Not assessed  MUSKULOSKELETAL: Normal ROM. No acute deformities. No edema. SKIN: Warm and dry. Small scabs and sores noted to hands, face, chest.  NEUROLOGICAL:  GCS 9.   Moving all extremities  PSYCHIATRIC: unresponsive        DIAGNOSTIC RESULTS:     LABS:    Results for orders placed or performed during the hospital encounter of 12/05/20   CBC auto differential   Result Value Ref Range    WBC 12.2 (H) 4.0 - 11.0 K/uL    RBC 4.49 4.20 - 5.90 M/uL    Hemoglobin 12.7 (L) 13.5 - 17.5 g/dL    Hematocrit 39.1 (L) 40.5 - 52.5 %    MCV 87.1 80.0 - 100.0 fL    MCH 28.2 26.0 - 34.0 pg    MCHC 32.4 31.0 - 36.0 g/dL    RDW 15.7 (H) 12.4 - 15.4 %    Platelets 813 549 - 267 K/uL    MPV 7.9 5.0 - 10.5 fL    Neutrophils % 58.8 %    Lymphocytes % 30.4 %    Monocytes % 8.4 %    Eosinophils % 1.9 %    Basophils % 0.5 %    Neutrophils Absolute 7.2 1.7 - 7.7 K/uL    Lymphocytes Absolute 3.7 1.0 - 5.1 K/uL    Monocytes Absolute 1.0 0.0 - 1.3 K/uL    Eosinophils Absolute 0.2 0.0 - 0.6 K/uL    Basophils Absolute 0.1 0.0 - 0.2 K/uL   Comprehensive metabolic panel   Result Value Ref Range    Sodium 137 136 - 145 mmol/L    Potassium 4.2 3.5 - 5.1 mmol/L    Chloride 102 99 - 110 mmol/L    CO2 26 21 - 32 mmol/L    Anion Gap 9 3 - 16    Glucose 92 70 - 99 mg/dL    BUN 21 (H) 7 - 20 mg/dL    CREATININE 0.9 0.8 - 1.3 mg/dL    GFR Non-African American >60 >60    GFR African American >60 >60    Calcium 9.3 8.3 - 10.6 mg/dL    Total Protein 7.7 6.4 - 8.2 g/dL    Alb 4.1 3.4 - 5.0 g/dL    Albumin/Globulin Ratio 1.1 1.1 - 2.2    Total Bilirubin 0.4 0.0 - 1.0 mg/dL    Alkaline Phosphatase 107 40 - 129 U/L    ALT 45 (H) 10 - 40 U/L    AST 45 (H) 15 - 37 U/L    Globulin 3.6 g/dL   Urine Drug Screen   Result Value Ref Range    Amphetamine Screen, Urine POSITIVE (A) Negative <1000ng/mL    Barbiturate Screen, Ur Neg Negative <200 ng/mL    Benzodiazepine Screen, Urine POSITIVE (A) Negative <200 ng/mL    Cannabinoid Scrn, Ur POSITIVE (A) Negative <50 ng/mL    Cocaine Metabolite Screen, Urine Neg Negative <300 ng/mL    Opiate Scrn, Ur Neg Negative <300 ng/mL    PCP Screen, Urine Neg Negative <25 ng/mL    Methadone Screen, Urine Neg Negative <300 ng/mL    Propoxyphene Scrn, Ur Neg Negative <300 ng/mL    Oxycodone Urine Neg Negative <100 ng/ml    pH, UA 6.0     Drug Screen Comment: see below    EKG 12 Lead   Result Value Ref Range    Ventricular Rate 71 BPM    Atrial Rate 71 BPM    P-R Interval 150 ms    QRS Duration 94 ms    Q-T Interval 410 ms    QTc Calculation (Bazett) 445 ms    P Axis 63 degrees    R Axis 2 degrees    T Axis 56 degrees    Diagnosis       Normal sinus rhythmNormal ECGNo previous ECGs available         RADIOLOGY:  All x-ray studies are viewed/reviewedby me. Formal interpretations per the radiologist are as follows:      CT HEAD WO CONTRAST   Final Result   No acute intracranial abnormality. XR CHEST PORTABLE   Final Result   No acute process.                  EKG:  See EKG interpretation by an attending phsyician      PROCEDURES:   N/A    CRITICAL CARE TIME:   N/A    CONSULTS:  IP CONSULT TO HOSPITALIST      EMERGENCYDEPARTMENT COURSE and DIFFERENTIAL DIAGNOSIS/MDM:   Vitals:    Vitals:    12/05/20 1246 12/05/20 1348 12/05/20 1354 12/05/20 1422   BP: 116/79  125/79 125/79   Pulse: 69 85 85 91   Resp: 27 24 24 24   Temp:       TempSrc:       SpO2: 96% 90% 98% 95%   Weight:       Height:           Patient was given the following medications:  Medications   naloxone (NARCAN) injection 2 mg (2 mg Nasal Given 12/5/20 1207)   naloxone (NARCAN) injection 2 mg (2 mg Intravenous Given 12/5/20 1220)   LORazepam (ATIVAN) injection 2 mg (2 mg Intravenous Given 12/5/20 1337)   midazolam (VERSED) injection 5 mg (5 mg Intramuscular Given 12/5/20 1228)         Patient was evaluated by both myself and Idalmis Urias DO. Patient presented to the emergency department today after I pulled him out of the car in the parking lot, he apparently went to the bathroom and assuming overdosed on some type medication. He was protecting his airway and breathing on his own but was unresponsive, I could get him to localize pain but not wake up. Patient mother did report that patient has extensive drug use history. Patient was given multiple doses of Narcan without any difference in his status, he did have some hypoxia prior to getting the Narcan, patient was then given Ativan and Versed because he became restless although still was not responsive. He was monitored in the ED, at this point we did feel the patient required admission the hospital, I discussed the case with the hospitalist who did agree with plan for admission for monitoring. I did speak with the patient's mother to update her on patient status. FINAL IMPRESSION:      1. Polysubstance abuse ([facility] [address]    2. Drug overdose, undetermined intent, initial encounter          DISPOSITION/PLAN:   DISPOSITIONDecision To Admit      PATIENT REFERRED TO:  No follow-up provider specified.     DISCHARGE MEDICATIONS:  New Prescriptions    No medications on file                  (Please note that portions of this note were completed with a voice recognition program.  Efforts were made to edit the dictations, but occasionally words are mis-transcribed.)    Roselyn Closs, APRN - CNP-C (electronically signed)        Roselyn Closs, APRN - CNP  12/05/20 7854

## 2020-12-05 NOTE — ED NOTES
Pt is awake but lethargic and able to answer some questions. Pt knows he is at [address] in the ED but does not know why. Pt also is able to answer his name.       Dieudonne Cline RN  12/05/20 4879

## 2020-12-05 NOTE — ED PROVIDER NOTES
QTc Calculation (Bazett) 445 ms    P Axis 63 degrees    R Axis 2 degrees    T Axis 56 degrees    Diagnosis       Normal sinus rhythmNormal ECGNo previous ECGs available       EKG  The Ekg interpreted by myself  Normal sinus rhythm with a rate of 71. Axis is   Normal  QTc is  normal  Intervals and Durations are unremarkable. No specific ST-T wave changes appreciated. No evidence of acute ischemia. No previous EKG. Cardiac Monitoring: No ectopy. Normal rate. RADIOLOGY  X-RAYS:  I have reviewed radiologic plain film image(s). ALL OTHER NON-PLAIN FILM IMAGES SUCH AS CT, ULTRASOUND AND MRI HAVE BEEN READ BY THE RADIOLOGIST. CT HEAD WO CONTRAST   Final Result   No acute intracranial abnormality. XR CHEST PORTABLE   Final Result   No acute process. Rechecks: Physical assessment performed. 1206: O2 sats 77%. Narcan provided. 1231: Patient becoming increasingly restless and agitated. Versed provided. 12 44: Patient resting comfortably. Critical Care: Critical care 35 minutes was completed on patient in addition to and excluding any procedures noted. ED COURSE/MDM  Patient seen and evaluated. Old records reviewed. Labs and imaging reviewed and results discussed with patient. Patient was given Narcan and Versed in the ED with good symptomatic relief. Patient was reassessed as noted above . Polysubstance use noted on drug screen. . Plan of care discussed with patient and family. Patient and family in agreement with plan. Patient was given scripts for the following medications. I counseled patient how to take these medications. New Prescriptions    No medications on file       CLINICAL IMPRESSION  1. Polysubstance abuse ([facility] [address]    2. Drug overdose, undetermined intent, initial encounter        Blood pressure 127/81, pulse 88, temperature 97.7 °F (36.5 °C), temperature source Oral, resp.  rate 25, height 5' 8\" (1.727 m), weight 180 lb (81.6 kg), SpO2 95 %.    DISPOSITION  Eliezer Vidal was discharged to home  in stable condition.         Panda Campoverde DO  12/05/20 3980

## 2020-12-05 NOTE — ED NOTES
Versed administered IM. For patient safety ED tech Stacie Renteria remaining at patient bedside. Seizure pads in place to ensure patient safety.       Aquilino Soni RN  12/05/20 7017

## 2020-12-05 NOTE — ED NOTES
PIV placed in left forearm with holding assistance from Javi Fuller RN and Pako Yang, ED Tech. Patient continues to be intermittently restless however he is protecting his airway.       Rodri Logan RN  12/05/20 6613

## 2020-12-05 NOTE — ED NOTES
Patient becoming more restless after Narcan administration. Dr Belem Del Cid at bedside. Versed ordered.       Will White RN  12/05/20 4525

## 2020-12-05 NOTE — ED NOTES
Patient's oxygen being monitored. Patient is able to maintain own airway right now.       Terrial Graft  12/05/20 1242

## 2020-12-05 NOTE — ED NOTES

## 2020-12-06 VITALS
WEIGHT: 143.8 LBS | RESPIRATION RATE: 22 BRPM | TEMPERATURE: 97.6 F | OXYGEN SATURATION: 97 % | DIASTOLIC BLOOD PRESSURE: 61 MMHG | BODY MASS INDEX: 21.3 KG/M2 | SYSTOLIC BLOOD PRESSURE: 136 MMHG | HEART RATE: 102 BPM | HEIGHT: 69 IN

## 2020-12-06 LAB
ALBUMIN SERPL-MCNC: 4 G/DL (ref 3.4–5)
ALP BLD-CCNC: 102 U/L (ref 40–129)
ALT SERPL-CCNC: 41 U/L (ref 10–40)
ANION GAP SERPL CALCULATED.3IONS-SCNC: 7 MMOL/L (ref 3–16)
AST SERPL-CCNC: 37 U/L (ref 15–37)
BASOPHILS ABSOLUTE: 0.1 K/UL (ref 0–0.2)
BASOPHILS RELATIVE PERCENT: 0.6 %
BILIRUB SERPL-MCNC: 0.5 MG/DL (ref 0–1)
BILIRUBIN DIRECT: <0.2 MG/DL (ref 0–0.3)
BILIRUBIN, INDIRECT: ABNORMAL MG/DL (ref 0–1)
BUN BLDV-MCNC: 20 MG/DL (ref 7–20)
CALCIUM SERPL-MCNC: 8.3 MG/DL (ref 8.3–10.6)
CHLORIDE BLD-SCNC: 103 MMOL/L (ref 99–110)
CO2: 26 MMOL/L (ref 21–32)
CREAT SERPL-MCNC: 0.8 MG/DL (ref 0.9–1.3)
EKG ATRIAL RATE: 71 BPM
EKG DIAGNOSIS: NORMAL
EKG P AXIS: 63 DEGREES
EKG P-R INTERVAL: 150 MS
EKG Q-T INTERVAL: 410 MS
EKG QRS DURATION: 94 MS
EKG QTC CALCULATION (BAZETT): 445 MS
EKG R AXIS: 2 DEGREES
EKG T AXIS: 56 DEGREES
EKG VENTRICULAR RATE: 71 BPM
EOSINOPHILS ABSOLUTE: 0.2 K/UL (ref 0–0.6)
EOSINOPHILS RELATIVE PERCENT: 2 %
GFR AFRICAN AMERICAN: >60
GFR NON-AFRICAN AMERICAN: >60
GLUCOSE BLD-MCNC: 89 MG/DL (ref 70–99)
HCT VFR BLD CALC: 37.6 % (ref 40.5–52.5)
HEMOGLOBIN: 12.3 G/DL (ref 13.5–17.5)
LYMPHOCYTES ABSOLUTE: 3.1 K/UL (ref 1–5.1)
LYMPHOCYTES RELATIVE PERCENT: 26.3 %
MCH RBC QN AUTO: 28.7 PG (ref 26–34)
MCHC RBC AUTO-ENTMCNC: 32.7 G/DL (ref 31–36)
MCV RBC AUTO: 87.9 FL (ref 80–100)
MONOCYTES ABSOLUTE: 0.9 K/UL (ref 0–1.3)
MONOCYTES RELATIVE PERCENT: 7.4 %
NEUTROPHILS ABSOLUTE: 7.6 K/UL (ref 1.7–7.7)
NEUTROPHILS RELATIVE PERCENT: 63.7 %
PDW BLD-RTO: 15.5 % (ref 12.4–15.4)
PLATELET # BLD: 410 K/UL (ref 135–450)
PMV BLD AUTO: 7.7 FL (ref 5–10.5)
POTASSIUM REFLEX MAGNESIUM: 4 MMOL/L (ref 3.5–5.1)
RBC # BLD: 4.28 M/UL (ref 4.2–5.9)
SODIUM BLD-SCNC: 136 MMOL/L (ref 136–145)
TOTAL PROTEIN: 7.4 G/DL (ref 6.4–8.2)
WBC # BLD: 11.9 K/UL (ref 4–11)

## 2020-12-06 PROCEDURE — 2580000003 HC RX 258: Performed by: INTERNAL MEDICINE

## 2020-12-06 PROCEDURE — C9113 INJ PANTOPRAZOLE SODIUM, VIA: HCPCS | Performed by: INTERNAL MEDICINE

## 2020-12-06 PROCEDURE — 93010 ELECTROCARDIOGRAM REPORT: CPT | Performed by: INTERNAL MEDICINE

## 2020-12-06 PROCEDURE — 80076 HEPATIC FUNCTION PANEL: CPT

## 2020-12-06 PROCEDURE — 87070 CULTURE OTHR SPECIMN AEROBIC: CPT

## 2020-12-06 PROCEDURE — 6360000002 HC RX W HCPCS: Performed by: INTERNAL MEDICINE

## 2020-12-06 PROCEDURE — 80048 BASIC METABOLIC PNL TOTAL CA: CPT

## 2020-12-06 PROCEDURE — 85025 COMPLETE CBC W/AUTO DIFF WBC: CPT

## 2020-12-06 RX ADMIN — PANTOPRAZOLE SODIUM 40 MG: 40 INJECTION, POWDER, FOR SOLUTION INTRAVENOUS at 07:37

## 2020-12-06 RX ADMIN — Medication 1.5 G: at 00:46

## 2020-12-06 RX ADMIN — MEROPENEM 1 G: 1 INJECTION, POWDER, FOR SOLUTION INTRAVENOUS at 06:03

## 2020-12-06 RX ADMIN — Medication 10 ML: at 07:37

## 2020-12-06 ASSESSMENT — PAIN SCALES - GENERAL: PAINLEVEL_OUTOF10: 8

## 2020-12-06 NOTE — DISCHARGE SUMMARY
Patient left AGAINST MEDICAL ADVICE before being seen. No exam possible for today.         Electronically signed by Antonino Taylor MD on 12/6/2020 at 8:42 AM

## 2020-12-06 NOTE — PROGRESS NOTES
Pt PICC line removed, tip intact. Bleeding at site, no complications. Tip sent to lab for culture. Will continue to monitor.

## 2020-12-06 NOTE — CONSULTS
Pharmacy reviewed and entered medication reconciliation per Dr. Bree Hudson consult request.  Information gathered from [address].   Ramiro Rivas, PharmD 8:58 PM EST 12/5/20

## 2020-12-06 NOTE — PROGRESS NOTES
Received a consult request for ID consultation. Chart reviewed. 41-year-old man, and Iv drug user, admitted with elevated white cell count, has a picc line in place, which was placed at outside hospital for IV antibiotics. Patient reportedly checked out himself from nursing facility, never got the picc line removed. Afebrile, white cell count is 12,400. Cannot rule out possibility of line related bacteremia. Will order two sets o f blood cultures from two different sites, will start patient on IV vancomycin and meropenem empirically. Remove the picc line and send tip for cultures. Called and discussed plan with RN.

## 2020-12-06 NOTE — PROGRESS NOTES
Assessment complete. Vital signs stable. Plan of care for the day reviewed with patient, all questions answered. Patient extremely anxious, mood is very labile. Patient pacing the room, yelling. Upon first assessment with night shift RN, patient was found with the sharps box hanging open. He said that his IV got caught and opened the container. Container closed, patient educated on unit policies. All belongings within reach, call light within reach, bed in the lowest position, non slip socks on when out of bed. Will continue to monitor.

## 2020-12-06 NOTE — FLOWSHEET NOTE
Cross cover page: Can get move off orders for pt. His is very stable and has been the whole time he has been up here. VSS. Pt A0x4. Pt is just getting IV abx. Please advise. Thanks    Response from Dr Lalit Miller: I'm unable to review this. Please page day MD at 7 who is best to make this decision since they know their patients better. Thank you!     Will send page at [address] [place] [address].

## 2020-12-07 NOTE — TELEPHONE ENCOUNTER
Spoke to mom, Andrea. She was with him over the weekend and he overdosed while they were at [facility] on 12/5. She took him to [facility] and has not seen him since then. She only dropped him off, no records he was admitted or seen at [facility]. She is unsure if he still has PICC line but states \"he abused it pretty good\". She has only been in touch with him by phone but has no idea where he is. Pt has not started therapy.

## 2020-12-07 NOTE — TELEPHONE ENCOUNTER
Received VM from patient at 10:52 AM. Called from [phone]. Message stated to call him back, called pt and there was no answer.  Unable to leave message

## 2020-12-09 ENCOUNTER — HOSPITAL ENCOUNTER (EMERGENCY)
Age: 40
Discharge: HOME | End: 2020-12-09
Payer: COMMERCIAL

## 2020-12-09 VITALS
OXYGEN SATURATION: 94 % | TEMPERATURE: 98.2 F | RESPIRATION RATE: 20 BRPM | HEART RATE: 84 BPM | SYSTOLIC BLOOD PRESSURE: 136 MMHG | BODY MASS INDEX: 21.19 KG/M2 | WEIGHT: 143.5 LBS | DIASTOLIC BLOOD PRESSURE: 77 MMHG

## 2020-12-09 LAB
A/G RATIO: 1.2 (ref 1.1–2.2)
ALBUMIN SERPL-MCNC: 4.2 G/DL (ref 3.4–5)
ALP BLD-CCNC: 99 U/L (ref 40–129)
ALT SERPL-CCNC: 46 U/L (ref 10–40)
AMPHETAMINE SCREEN, URINE: POSITIVE
ANION GAP SERPL CALCULATED.3IONS-SCNC: 7 MMOL/L (ref 3–16)
AST SERPL-CCNC: 38 U/L (ref 15–37)
BARBITURATE SCREEN URINE: ABNORMAL
BASOPHILS ABSOLUTE: 0.1 K/UL (ref 0–0.2)
BASOPHILS RELATIVE PERCENT: 1.2 %
BENZODIAZEPINE SCREEN, URINE: ABNORMAL
BILIRUB SERPL-MCNC: 0.4 MG/DL (ref 0–1)
BLOOD CULTURE, ROUTINE: NORMAL
BUN BLDV-MCNC: 16 MG/DL (ref 7–20)
CALCIUM SERPL-MCNC: 9.1 MG/DL (ref 8.3–10.6)
CANNABINOID SCREEN URINE: ABNORMAL
CHLORIDE BLD-SCNC: 102 MMOL/L (ref 99–110)
CO2: 27 MMOL/L (ref 21–32)
COCAINE METABOLITE SCREEN URINE: ABNORMAL
CREAT SERPL-MCNC: 1 MG/DL (ref 0.9–1.3)
CULTURE CATHETER TIP: NORMAL
CULTURE, BLOOD 2: NORMAL
EOSINOPHILS ABSOLUTE: 0.4 K/UL (ref 0–0.6)
EOSINOPHILS RELATIVE PERCENT: 5.2 %
ETHANOL: NORMAL MG/DL (ref 0–0.08)
GFR AFRICAN AMERICAN: >60
GFR NON-AFRICAN AMERICAN: >60
GLOBULIN: 3.4 G/DL
GLUCOSE BLD-MCNC: 87 MG/DL (ref 70–99)
HCT VFR BLD CALC: 38.1 % (ref 40.5–52.5)
HEMOGLOBIN: 12.5 G/DL (ref 13.5–17.5)
LYMPHOCYTES ABSOLUTE: 3.1 K/UL (ref 1–5.1)
LYMPHOCYTES RELATIVE PERCENT: 37.8 %
Lab: ABNORMAL
MCH RBC QN AUTO: 29.1 PG (ref 26–34)
MCHC RBC AUTO-ENTMCNC: 32.7 G/DL (ref 31–36)
MCV RBC AUTO: 88.9 FL (ref 80–100)
METHADONE SCREEN, URINE: ABNORMAL
MONOCYTES ABSOLUTE: 0.7 K/UL (ref 0–1.3)
MONOCYTES RELATIVE PERCENT: 9 %
NEUTROPHILS ABSOLUTE: 3.9 K/UL (ref 1.7–7.7)
NEUTROPHILS RELATIVE PERCENT: 46.8 %
OPIATE SCREEN URINE: POSITIVE
OXYCODONE URINE: ABNORMAL
PDW BLD-RTO: 15.4 % (ref 12.4–15.4)
PH UA: 5
PHENCYCLIDINE SCREEN URINE: POSITIVE
PLATELET # BLD: 404 K/UL (ref 135–450)
PMV BLD AUTO: 7.5 FL (ref 5–10.5)
POTASSIUM REFLEX MAGNESIUM: 4.1 MMOL/L (ref 3.5–5.1)
PROPOXYPHENE SCREEN: ABNORMAL
RBC # BLD: 4.28 M/UL (ref 4.2–5.9)
SODIUM BLD-SCNC: 136 MMOL/L (ref 136–145)
TOTAL PROTEIN: 7.6 G/DL (ref 6.4–8.2)
WBC # BLD: 8.2 K/UL (ref 4–11)

## 2020-12-09 PROCEDURE — 80307 DRUG TEST PRSMV CHEM ANLYZR: CPT

## 2020-12-09 PROCEDURE — 93005 ELECTROCARDIOGRAM TRACING: CPT | Performed by: NURSE PRACTITIONER

## 2020-12-09 PROCEDURE — 99283 EMERGENCY DEPT VISIT LOW MDM: CPT

## 2020-12-09 PROCEDURE — 85025 COMPLETE CBC W/AUTO DIFF WBC: CPT

## 2020-12-09 PROCEDURE — 80053 COMPREHEN METABOLIC PANEL: CPT

## 2020-12-09 PROCEDURE — 2580000003 HC RX 258: Performed by: NURSE PRACTITIONER

## 2020-12-09 PROCEDURE — G0480 DRUG TEST DEF 1-7 CLASSES: HCPCS

## 2020-12-09 RX ORDER — 0.9 % SODIUM CHLORIDE 0.9 %
1000 INTRAVENOUS SOLUTION INTRAVENOUS ONCE
Status: COMPLETED | OUTPATIENT
Start: 2020-12-09 | End: 2020-12-09

## 2020-12-09 RX ADMIN — SODIUM CHLORIDE 1000 ML: 9 INJECTION, SOLUTION INTRAVENOUS at 20:39

## 2020-12-09 ASSESSMENT — PAIN SCALES - GENERAL: PAINLEVEL_OUTOF10: 8

## 2020-12-09 ASSESSMENT — PAIN DESCRIPTION - LOCATION: LOCATION: BACK

## 2020-12-09 ASSESSMENT — PAIN DESCRIPTION - PAIN TYPE: TYPE: ACUTE PAIN

## 2020-12-09 NOTE — TELEPHONE ENCOUNTER
Made an attempt to reach patient at the number he left on VM. No answer but I was able to leave a VM. Asked patient to call office.

## 2020-12-10 LAB
EKG ATRIAL RATE: 72 BPM
EKG DIAGNOSIS: NORMAL
EKG P AXIS: 79 DEGREES
EKG P-R INTERVAL: 152 MS
EKG Q-T INTERVAL: 418 MS
EKG QRS DURATION: 88 MS
EKG QTC CALCULATION (BAZETT): 457 MS
EKG R AXIS: 2 DEGREES
EKG T AXIS: 57 DEGREES
EKG VENTRICULAR RATE: 72 BPM

## 2020-12-10 PROCEDURE — 93010 ELECTROCARDIOGRAM REPORT: CPT | Performed by: INTERNAL MEDICINE

## 2020-12-10 NOTE — ED NOTES
Fall risk screening completed. Fall risk bracelet applied to patient. Non-skid socks provided and placed on patient. The fall risk is indicated using  dome light . Based on score, a bed alarm was indicated and applied. The call light is within the patient's reach, and instructions for use were provided. The bed is in the lowest position with wheels locked. The patient has been advised to notify staff, using the call light, if there is a need to get up or use restroom. The patient verbalized understanding of safety precautions and how to contact staff for assistance.        John Carter RN  12/09/20 2011

## 2020-12-10 NOTE — ED NOTES
All discharge paperwork and follow-up instructions reviewed with pt. Pt verbalized understanding. Pt ambulatory upon discharge in stable condition to anel.        Javi Urban RN  12/09/20 4935

## 2020-12-10 NOTE — ED NOTES
Pt very agitated that he cannot get a hold of him Mom. Tried calling pts Mom - no answer.      Marti Mcdonnell RN  12/09/20 8631

## 2020-12-10 NOTE — ED PROVIDER NOTES
I did not participate in the care of this patient. I did interpret the 12-lead EKG as follows:    Normal sinus rhythm, IA interval QRS QTC normal.  Normal axis. No acute ischemic changes. No significant changes when compared to previous in December 2020.      Jose Ball MD  12/10/20 5661       Jose Ball MD  12/10/20 1922

## 2020-12-10 NOTE — ED NOTES
This tech checked on pt after he called out. Pt states \" he needs to get back to his hotel by midnight otherwise he looses his hotel and will be homeless again. \" Pt panicking and agitated because family members will not answer their phone or pick him up.        Midge Liebenthal  12/09/20 1979

## 2020-12-10 NOTE — ED PROVIDER NOTES
[facility]  ED  EMERGENCY DEPARTMENT ENCOUNTER        Pt Name: Jose Francisco Jacome  MRN: [id number]  [place] 1980  Date of evaluation: 12/9/2020  Provider: JORDY Gonzalez CNP  PCP: JORDY Don CNP    GIULIA. I have evaluated this patient. My supervising physician was available for consultation. CHIEF COMPLAINT       Chief Complaint   Patient presents with    Drug Overdose     pt to ED via mom with c/o overdose on seroquel and other unknown drugs. pt mom states pt will take any drugs available to him. pt arousable to verbal stimuli. SPO2 99% on room air, maintaining own airway       HISTORY OF PRESENT ILLNESS   (Location, Timing/Onset, Context/Setting, Quality, Duration, Modifying Factors, Severity, Associated Signs and Symptoms)  Note limiting factors. Jose Francisco Jacome is a 41 y.o. male with medical history of pancreatic cancer, hepatitis C, IVDA, GERD, polysubstance abuse, discitis, osteomyelitis, MRSA, depression, appendectomy who presents the ED after a suspected drug overdose. Patient said he went to [address] [place] [address] with his wife for dinner. He did note to possibly taking his Seroquel prior to going out to dinner. Said he just wanted to sleep. He does note that he is prescribed Seroquel 100 mg. He then also admits to taking some other unknown medication although he does not say what he took. Patient is very drowsy. Patient thought his wife had called the ambulance and therefore was brought to the ED. According to triage note mom brought patient to the emergency department for further evaluation. She denies any additional street drugs or IV drug abuse. When I questioned patient why his speech was slurred and he continues to lose off to sleep he said it is due to the Seroquel. He does note to have numerous to his bilateral upper extremities and he said he is unsure why these wounds occurred.   Said he did fall in the past and that the skin is slow to heal.  He denies any SI or HI and said he took the medicine because he wanted to sleep. Nursing Notes were all reviewed and agreed with or any disagreements were addressed in the HPI. Review of medical records:  Patient has numerous visits to the ED for polysubstance abuse and overdose. It appears previously whenever he was admitted he did leave AMA prior to treatment being completed. REVIEW OF SYSTEMS    (2-9 systems for level 4, 10 or more for level 5)     Review of Systems    Positives and Pertinent negatives as per HPI. Except as noted above in the ROS, all other systems were reviewed and negative. PAST MEDICAL HISTORY     Past Medical History:   Diagnosis Date    Back pain     Cancer ([facility] [address]     Pancreatic cancer    Depression 12/9/2019    GERD (gastroesophageal reflux disease)     Hepatitis C     Hepatitis-C     MRSA (methicillin resistant staph aureus) culture positive 09/25/2020    L5 disc asp    Polysubstance abuse ([facility] [address]     Wears glasses          SURGICAL HISTORY     Past Surgical History:   Procedure Laterality Date    APPENDECTOMY      IR PERC ASPIRATION INTERVERT [address]  11/25/2020    IR PERC ASPIRATION INTERVERT [address] [facility]ED[facility]    SHOULDER SURGERY Left 4/1/14    SHOULDER SURGERY      UPPER GASTROINTESTINAL ENDOSCOPY  2014         CURRENTMEDICATIONS       Discharge Medication List as of 12/9/2020 10:50 PM      CONTINUE these medications which have NOT CHANGED    Details   !! folic acid (FOLVITE) 1 MG tablet Take 1 mg by mouth dailyHistorical Med      !! gabapentin (NEURONTIN) 300 MG capsule Take 300 mg by mouth 3 times daily.  Indications: Neuropathic Pain Historical Med      !! ibuprofen (ADVIL;MOTRIN) 800 MG tablet Take 800 mg by mouth every 8 hours as needed for Pain Historical Med      !! nicotine (NICODERM CQ) 21 MG/24HR Place 1 patch onto the skin every 24 hoursHistorical Med      !! pantoprazole (PROTONIX) 40 MG tablet Take 40 mg by mouth dailyHistorical Med      QUEtiapine (SEROQUEL) 100 MG tablet Take 100 mg by mouth nightlyHistorical Med      !! busPIRone (BUSPAR) 15 MG tablet Take 15 mg by mouth 3 times daily as needed (anxiety)Historical Med      linezolid (ZYVOX) 600 MG tablet Take 600 mg by mouth 2 times dailyHistorical Med      dicyclomine (BENTYL) 20 MG tablet Take 1 tablet by mouth every 6 hours as needed (Abdominal Cramping), Disp-120 tablet,R-3Print      melatonin 3 MG TABS tablet Take 1 tablet by mouth nightly, Disp-30 tablet,R-3Print      polyethylene glycol (GLYCOLAX) 17 g packet Take 17 g by mouth daily as needed for Constipation, Disp-527 g,R-1Print      !! nicotine (NICODERM CQ) 21 MG/24HR Place 1 patch onto the skin daily, Disp-30 patch,R-3Normal      !! busPIRone (BUSPAR) 15 MG tablet Take 15 mg by mouth 3 times daily as needed Indications: Feeling AnxiousHistorical Med      !! folic acid (FOLVITE) 1 MG tablet Take 1 mg by mouth dailyHistorical Med      !! gabapentin (NEURONTIN) 300 MG capsule Take 300 mg by mouth 3 times daily. Historical Med      buPROPion (WELLBUTRIN SR) 100 MG extended release tablet Take 100 mg by mouth 2 times dailyHistorical Med      !! ibuprofen (ADVIL;MOTRIN) 800 MG tablet Take 800 mg by mouth every 6 hours as needed for PainHistorical Med      !! pantoprazole (PROTONIX) 40 MG tablet Take 40 mg by mouth dailyHistorical Med      OLANZapine (ZYPREXA) 10 MG tablet Take 1 tablet by mouth nightly, Disp-30 tablet, R-0Normal       !! - Potential duplicate medications found. Please discuss with provider. ALLERGIES     Patient has no known allergies.     FAMILYHISTORY       Family History   Problem Relation Age of Onset    Cancer Mother     Heart Disease Father           SOCIAL HISTORY       Social History     Tobacco Use    Smoking status: Current Every Day Smoker     Packs/day: 1.00     Types: Cigarettes    Smokeless tobacco: Never Used   Substance Use Topics    Alcohol use: Never Hemoglobin 12.5 (*)     Hematocrit 38.1 (*)     All other components within normal limits    Narrative:     Performed at:  [address], [facility] [email or web address]   Phone [phone]   COMPREHENSIVE METABOLIC PANEL W/ REFLEX TO MG FOR LOW K - Abnormal; Notable for the following components:    ALT 46 (*)     AST 38 (*)     All other components within normal limits    Narrative:     Performed at:  [address], [facility] [email or web address]   Phone [phone] - Abnormal; Notable for the following components:    Amphetamine Screen, Urine POSITIVE (*)     Opiate Scrn, Ur POSITIVE (*)     PCP Screen, Urine POSITIVE (*)     All other components within normal limits    Narrative:     Performed at:  [address], [facility] [email or web address]   Phone [phone]   ETHANOL    Narrative:     Performed at:  [address], [facility] [email or web address]   Phone [phone]       All other labs were within normal range or not returned as of this dictation. EKG: All EKG's are interpreted by the Emergency Department Physician in the absence of a cardiologist.  Please see their note for interpretation of EKG.       RADIOLOGY:   Non-plain film images such as CT, Ultrasound and MRI are read by the radiologist. Plain radiographic images are visualized and preliminarily interpreted by the ED Provider with the below findings:        Interpretation per the Radiologist below, if available at the time of this note:    No orders to display     Ct Head Wo Contrast    Result Date: 12/5/2020  EXAMINATION: CT OF THE HEAD WITHOUT CONTRAST  12/5/2020 1:58 pm TECHNIQUE: CT of the head was performed without the administration of intravenous contrast. Dose modulation, iterative reconstruction, and/or weight based adjustment of the mA/kV was utilized to reduce the radiation dose to as low as reasonably achievable. COMPARISON: None. HISTORY: ORDERING SYSTEM PROVIDED HISTORY: ams, overdose TECHNOLOGIST PROVIDED HISTORY: Reason for exam:->ams, overdose Has a \"code stroke\" or \"stroke alert\" been called? ->No FINDINGS: BRAIN/VENTRICLES: There is no acute intracranial hemorrhage, mass effect or midline shift. No abnormal extra-axial fluid collection. The gray-white differentiation is maintained without evidence of an acute infarct. There is no evidence of hydrocephalus. ORBITS: The visualized portion of the orbits demonstrate no acute abnormality. SINUSES: The visualized paranasal sinuses and mastoid air cells demonstrate no acute abnormality. SOFT TISSUES/SKULL:  No acute abnormality of the visualized skull or soft tissues. No acute intracranial abnormality. Xr Chest Portable    Result Date: 12/5/2020  EXAMINATION: ONE XRAY VIEW OF THE CHEST 12/5/2020 11:35 am COMPARISON: None. HISTORY: ORDERING SYSTEM PROVIDED HISTORY: overdose TECHNOLOGIST PROVIDED HISTORY: Reason for exam:->overdose Reason for Exam: PICC PLACEMENT - DRUG OVERDOSE FINDINGS: The lungs are without acute focal process. There is no effusion or pneumothorax. The cardiomediastinal silhouette is without acute process. The osseous structures are without acute process. No acute process. PROCEDURES   Unless otherwise noted below, none     Procedures    CRITICAL CARE TIME   N/A    CONSULTS:  None      EMERGENCY DEPARTMENT COURSE and DIFFERENTIAL DIAGNOSIS/MDM:   Vitals:    Vitals:    12/09/20 2009 12/09/20 2039 12/09/20 2116 12/09/20 2251   BP: 113/74 118/73 125/65 136/77   Pulse: 59 69 94 84   Resp: 12 14 19 20   Temp:       TempSrc:       SpO2: 99% 100% 94% 94%   Weight:           Patient was given the following medications:  Medications   0.9 % sodium chloride bolus (0 mLs Intravenous Stopped 12/9/20 2118)           Pertinent Labs & Imaging studies reviewed.  (See chart for details) -  Patient seen and evaluated in the emergency department. -  Triage and nursing notes reviewed and incorporated. -  Old chart records reviewed and incorporated. -  Patient case was not discussed with attending physician,  although Dr. Jose Lopez was available for consultation  -  Differential diagnosis includes:  Substance abuse, medication overdose, cardiac arrhythmia, dehydration, UTI, versus COVID-19  -  Work-up included:  See above CBC, CMP, UDS, ethanol level  -  ED treatment included:  Normal Saline  - Consults: None  -  Results discussed with patient. Labs show  UDS is positive for amphetamines, opiates, PCP all which show high levels of concentration. CBC with hemoglobin 12.5, hematocrit 38.1. CMP with ALT 46, AST 38.  Ethanol level nondetected. Patient is now more awake and alert and calling out on his phone. Alex Moss he needs to go home requesting to call his mother. His mother was called numerous times and cannot get a hold of her. Patient said he does have a room at the [facility] and is requesting transportation to be discharged. Pt was given strict return precautions. The patient is agreeable with plan of care and disposition.  -  Disposition: Home    FINAL IMPRESSION      1. Amphetamine abuse ([facility] [address]    2. Opiate abuse, continuous ([facility] [address]    3. Drug overdose, undetermined intent, initial encounter    4.  PCP abuse [facility])          DISPOSITION/PLAN   DISPOSITION        PATIENT REFERREDTO:  JORDY Robles CNP    Call in 2 days  As needed, If symptoms worsen    [facility]  ED  [facility] [address] [phone]  Go to   As needed      DISCHARGE MEDICATIONS:  Discharge Medication List as of 12/9/2020 10:50 PM          DISCONTINUED MEDICATIONS:  Discharge Medication List as of 12/9/2020 10:50 PM                 (Please note that portions of this note were completed with a voice recognition program.  Efforts were made to edit the dictations but occasionally words are mis-transcribed.)    JORDY Geiger CNP (electronically signed)            JORDY Geiger CNP  12/10/20 9661

## 2020-12-14 LAB
BODY FLUID CULTURE, STERILE: NORMAL
GRAM STAIN RESULT: NORMAL

## 2020-12-21 ENCOUNTER — TELEPHONE (OUTPATIENT)
Dept: INFECTIOUS DISEASES | Age: 40
End: 2020-12-21

## 2020-12-21 NOTE — TELEPHONE ENCOUNTER
Spoke to mother, states pt is at PCP appt now but she is not with him so unable to confirm. Will have pt call us when he gets back so we can encourage him to go to ED.

## 2020-12-21 NOTE — TELEPHONE ENCOUNTER
Mother called to ask if we can order a CT/MRI of the spine. Patient had one ordered from [facility] and his ex wife cancelled it. He apparently had this scheduled for seizures. She is aware we do not handle that here, only infectious disease. Mother states patient has drainage and blood all over him and is concerned about his spinal infection. Pt has appt scheduled for 1/5/21. It does not seem patient is agreeable to going to ED.

## 2021-01-05 ENCOUNTER — OFFICE VISIT (OUTPATIENT)
Dept: INFECTIOUS DISEASES | Age: 41
End: 2021-01-05
Payer: COMMERCIAL

## 2021-01-05 VITALS
WEIGHT: 152 LBS | DIASTOLIC BLOOD PRESSURE: 70 MMHG | TEMPERATURE: 98.4 F | HEIGHT: 69 IN | SYSTOLIC BLOOD PRESSURE: 112 MMHG | BODY MASS INDEX: 22.51 KG/M2

## 2021-01-05 DIAGNOSIS — M46.27 OSTEOMYELITIS OF VERTEBRA, LUMBOSACRAL REGION (HCC): ICD-10-CM

## 2021-01-05 DIAGNOSIS — M46.45 DISCITIS OF THORACOLUMBAR REGION: Primary | ICD-10-CM

## 2021-01-05 PROCEDURE — 99215 OFFICE O/P EST HI 40 MIN: CPT | Performed by: INTERNAL MEDICINE

## 2021-01-05 PROCEDURE — 1111F DSCHRG MED/CURRENT MED MERGE: CPT | Performed by: INTERNAL MEDICINE

## 2021-01-05 PROCEDURE — G8420 CALC BMI NORM PARAMETERS: HCPCS | Performed by: INTERNAL MEDICINE

## 2021-01-05 PROCEDURE — G8427 DOCREV CUR MEDS BY ELIG CLIN: HCPCS | Performed by: INTERNAL MEDICINE

## 2021-01-05 PROCEDURE — 4004F PT TOBACCO SCREEN RCVD TLK: CPT | Performed by: INTERNAL MEDICINE

## 2021-01-05 PROCEDURE — G8484 FLU IMMUNIZE NO ADMIN: HCPCS | Performed by: INTERNAL MEDICINE

## 2021-01-05 NOTE — PROGRESS NOTES
Department of Internal Medicine  Infectious Diseases      Patient Name: Marina Guillaume      Date of visit: 1/5/2021  SUBJECTIVE:  Priyanka Rocha  is a 41 y.o. male with history of IVDU, CAD, HCV infection, MRSA boils. who returns today for follow-up lumboscral discitis.     He has had fragmented care for this problem since 9/2020    Admission A 9/23-9/26/20 with MRI evidence of L5-S1 discitis and epidural phelgmon. Attempted disc biopsy terminated due to patient intolerance of procedure. He left AMA    Admitted [facility] 10/8-10/14/20  Disc biopsy negative  Had a BC posiive for MSA by DNA detection with routine BC result positive for S epidermidis. In house Vanc/cefepime and discharged on Linezolid x4 week supply. He took it for 2 weeks and then stopped due to concerns of SE with psych meds (wellbutrin). Outpatient ID follow-up 11/18/20 at which time dalbavancin infusion x2 was ordered, appears he had 1 infusion.           Then admitted [facility] through ED 11/24-11/27/20  MRI Lumbosacral spine - discitis/osteomyelitis with suspect small epidural abscess  Had another IR biopsy with negative cultures. DC from [facility] to SNF on 11/27/20 on IV Vanc with plan for 8 weeks  Patient left AMA 2d later    Admitted overnight early December and again left AMA     Has not been on any abx or about the last month. In the last month - continued low back pain, L>R, radiating cephalad and also with some radicular component. Infrequent episodes of chills in the last month. No documented fever. Sometimes the L leg \"feels asleep. \"  Few times legs have felt weak. Lasts a few hours and resolves.     Multiple non-healing wounds shelia UE, he says all are improving     Says no meth or opioid use, no IDU since before [facility]   Some cannibis and cocaine use recently         REVIEW OF SYSTEMS:    CONSTITUTIONAL:   Per HPI   EYES:  negative for blurred vision, eye discharge, visual disturbance and icterus  HEENT:  negative for daily 30 patch 3    busPIRone (BUSPAR) 15 MG tablet Take 15 mg by mouth 3 times daily as needed Indications: Feeling Anxious      folic acid (FOLVITE) 1 MG tablet Take 1 mg by mouth daily      gabapentin (NEURONTIN) 300 MG capsule Take 300 mg by mouth 3 times daily.  buPROPion (WELLBUTRIN SR) 100 MG extended release tablet Take 100 mg by mouth 2 times daily      ibuprofen (ADVIL;MOTRIN) 800 MG tablet Take 800 mg by mouth every 6 hours as needed for Pain      pantoprazole (PROTONIX) 40 MG tablet Take 40 mg by mouth daily      OLANZapine (ZYPREXA) 10 MG tablet Take 1 tablet by mouth nightly 30 tablet 0     No current facility-administered medications for this visit. Physical:  VITALS:  /70   Temp 98.4 °F (36.9 °C) (Infrared)   Ht 5' 9\" (1.753 m)   Wt 152 lb (68.9 kg)   BMI 22.45 kg/m²     General Appearance:  Alert, oriented, NAD  Skin:   Multiple excoriated boils dorsum hands and forearms. No purulence expressed  Mouth/Throat:   MMM, poor dentition. No thrush  Neck:   Supple, no meningismus  Lungs:   CTA shelia without W/R/R  Heart:   RRR without murmur  Abdomen:   Sot, flat, NT   Tenderness lower thoracic and lumbar spine  NEUROLOGIC:  No LE weakness noted   Normal gait        MICROBIOLOGY RESULTS   9/23/20 BC x2 neg   9/23/20 HIV screen neg   9/25/20 urine GC/CT neg   9/25/20 wound culture shoulder abscess MRSA   10/8/20 BC x1 MRSA by DNA detection   10/12/20 disc biopsy neg   11/24/20 BC x2 neg   11/25/20 disc biopsy neg, GS no org seen  12/5/20 BC x2 neg   12/23/20 wound culture arm MRSA        RADIOLOGY RESULTS:  MRI L spine 11/24/20  Impression   Abnormal appearance of the L5 and S1 levels. There is intervertebral disc   space narrowing with erosive endplate changes and small intervertebral fluid   collection. Diffuse signal abnormality is noted throughout the L5 and S1   vertebral bodies indicating marrow edema.  On the postcontrast images, there   is enhancement of the intervertebral space as well as enhancement of the   epidural margin posterior to the L5-S1 levels and patchy enhancement of the   L5 and S1 vertebral bodies. The findings are compatible with   discitis/osteomyelitis with a suspect small epidural abscess. 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     Lumbosacral oste-discitis  MRSA presumed to be etiologic agent with MRSA isolated by DNA detection in a BC at [facility] 10/2020; disc biopsy x2 with negative culture however  Fragmented care at several different facilities over the last 3.5 months  Persistent low back pain, radicular symptoms, tenderness involving thoracolumbar spine concerning for persistent infection       HIV ab 8/14/20 negative   Chronic HCV infection, +RNA 4/16/20      NKDA      Plan     Problem discussed with patient at length  I am concerned he has persistent spinal infection for which an extended course of IV abx is likely to be the only path to improvement   He was offered direct admission for evaluation to include blood work and imaging with likely plan for 6-8 week course of abx  Patient declined admission but affirmed a willingness to go ahead with outpatient staging with labs and MRI, both of which were ordered. MRI scheduled for this morning, patient provided information.      Based on results of labs, MRI, will discuss treatment options - placement for extended IV abx, daily trip to infusion center, po abx (outside the standard of care and with higher risk of treatment failure)    Risk of progressive infection without treatment that could lead to paralysis discussed with patient, questions answered    Best reached [phone] (house) [phone] (cell)      Time spent on this encounter, more than 50% face to face - 65 minutes       Janet Bundy MD      Called patient  He failed to show up for MRI  He says he will call to reschedule

## 2021-01-06 ENCOUNTER — HOSPITAL ENCOUNTER (OUTPATIENT)
Dept: WOUND CARE | Age: 41
Discharge: HOME | End: 2021-01-06
Payer: COMMERCIAL

## 2021-01-06 VITALS
RESPIRATION RATE: 18 BRPM | DIASTOLIC BLOOD PRESSURE: 84 MMHG | BODY MASS INDEX: 21.66 KG/M2 | TEMPERATURE: 98.4 F | WEIGHT: 146.2 LBS | HEART RATE: 101 BPM | SYSTOLIC BLOOD PRESSURE: 146 MMHG | HEIGHT: 69 IN

## 2021-01-06 DIAGNOSIS — T07.XXXA MULTIPLE OPEN WOUNDS: ICD-10-CM

## 2021-01-06 DIAGNOSIS — F42.4 HABITUAL SELF-EXCORIATION: ICD-10-CM

## 2021-01-06 DIAGNOSIS — Z22.322 MRSA COLONIZATION: ICD-10-CM

## 2021-01-06 PROCEDURE — 99253 IP/OBS CNSLTJ NEW/EST LOW 45: CPT | Performed by: INTERNAL MEDICINE

## 2021-01-06 PROCEDURE — 99203 OFFICE O/P NEW LOW 30 MIN: CPT

## 2021-01-06 PROCEDURE — 99204 OFFICE O/P NEW MOD 45 MIN: CPT

## 2021-01-06 RX ORDER — TRAZODONE HYDROCHLORIDE 100 MG/1
100 TABLET ORAL NIGHTLY
COMMUNITY

## 2021-01-06 RX ORDER — CLINDAMYCIN PHOSPHATE 10 MG/G
GEL TOPICAL
Qty: 60 G | Refills: 1 | Status: SHIPPED | OUTPATIENT
Start: 2021-01-06 | End: 2021-01-13

## 2021-01-06 RX ORDER — LIDOCAINE AND PRILOCAINE 25; 25 MG/G; MG/G
CREAM TOPICAL
Qty: 60 G | Refills: 1 | Status: SHIPPED | OUTPATIENT
Start: 2021-01-06

## 2021-01-06 RX ORDER — LIDOCAINE 40 MG/G
CREAM TOPICAL ONCE
Status: DISCONTINUED | OUTPATIENT
Start: 2021-01-06 | End: 2021-01-07 | Stop reason: HOSPADM

## 2021-01-06 RX ORDER — MUPIROCIN CALCIUM 20 MG/G
CREAM TOPICAL
Qty: 60 G | Refills: 1 | Status: SHIPPED | OUTPATIENT
Start: 2021-01-06 | End: 2021-01-06 | Stop reason: ALTCHOICE

## 2021-01-06 ASSESSMENT — PAIN DESCRIPTION - PAIN TYPE: TYPE: ACUTE PAIN

## 2021-01-06 ASSESSMENT — PAIN DESCRIPTION - FREQUENCY
FREQUENCY: CONTINUOUS
FREQUENCY: CONTINUOUS

## 2021-01-06 ASSESSMENT — PAIN SCALES - GENERAL: PAINLEVEL_OUTOF10: 4

## 2021-01-06 ASSESSMENT — PAIN DESCRIPTION - ONSET
ONSET: ON-GOING
ONSET: ON-GOING

## 2021-01-06 ASSESSMENT — PAIN - FUNCTIONAL ASSESSMENT: PAIN_FUNCTIONAL_ASSESSMENT: PREVENTS OR INTERFERES SOME ACTIVE ACTIVITIES AND ADLS

## 2021-01-06 ASSESSMENT — PAIN DESCRIPTION - ORIENTATION: ORIENTATION: RIGHT;LEFT

## 2021-01-06 NOTE — PLAN OF CARE
New patient presents with traumatic wounds to bilateral hands and some healed wounds to legs and chin. Patient does have a history of substance abuse and picking of the wounds due to they \"itch and burn\". No debridement today, will begin suing in addition to his PSO, adaptic and lidocaine. Also provided with Spandagrip to cover the lower arms to help sooth and keep dressing in place. Patient instructed on keeping mounds slightly moistened and to change dressing daily. Follow up in 2 weeks in wound clinic. Discharge instructions reviewed with patient, all questions answered, copy given to patient. Dressings were applied to all wounds per M.D. Instructions at this visit.

## 2021-01-10 PROBLEM — F42.4 HABITUAL SELF-EXCORIATION: Status: ACTIVE | Noted: 2021-01-10

## 2021-01-10 PROBLEM — T07.XXXA MULTIPLE OPEN WOUNDS: Status: ACTIVE | Noted: 2021-01-10

## 2021-01-10 PROBLEM — G93.41 ACUTE METABOLIC ENCEPHALOPATHY: Status: RESOLVED | Noted: 2019-12-05 | Resolved: 2021-01-10

## 2021-01-10 PROBLEM — R41.89 UNRESPONSIVE: Status: RESOLVED | Noted: 2020-12-05 | Resolved: 2021-01-10

## 2021-01-10 PROBLEM — J96.00 ACUTE RESPIRATORY FAILURE (HCC): Status: RESOLVED | Noted: 2019-12-05 | Resolved: 2021-01-10

## 2021-01-10 PROBLEM — Z22.322 MRSA COLONIZATION: Status: ACTIVE | Noted: 2021-01-10

## 2021-01-12 ENCOUNTER — HOSPITAL ENCOUNTER (OUTPATIENT)
Dept: MRI IMAGING | Age: 41
Discharge: HOME | End: 2021-01-12
Payer: COMMERCIAL

## 2021-01-12 DIAGNOSIS — M46.45 DISCITIS OF THORACOLUMBAR REGION: ICD-10-CM

## 2021-01-12 PROCEDURE — 72158 MRI LUMBAR SPINE W/O & W/DYE: CPT

## 2021-01-12 PROCEDURE — 72157 MRI CHEST SPINE W/O & W/DYE: CPT

## 2021-01-12 PROCEDURE — 6360000004 HC RX CONTRAST MEDICATION: Performed by: INTERNAL MEDICINE

## 2021-01-12 PROCEDURE — A9579 GAD-BASE MR CONTRAST NOS,1ML: HCPCS | Performed by: INTERNAL MEDICINE

## 2021-01-12 RX ADMIN — GADOTERIDOL 14 ML: 279.3 INJECTION, SOLUTION INTRAVENOUS at 11:58

## 2021-01-13 ENCOUNTER — TELEPHONE (OUTPATIENT)
Dept: INFECTIOUS DISEASES | Age: 41
End: 2021-01-13

## 2021-01-15 ENCOUNTER — HOSPITAL ENCOUNTER (EMERGENCY)
Age: 41
Discharge: LEFT BEFORE DISCHARGE | End: 2021-01-15
Payer: COMMERCIAL

## 2021-01-15 ENCOUNTER — TELEPHONE (OUTPATIENT)
Dept: INFECTIOUS DISEASES | Age: 41
End: 2021-01-15

## 2021-01-15 VITALS
HEART RATE: 105 BPM | BODY MASS INDEX: 21.7 KG/M2 | OXYGEN SATURATION: 98 % | TEMPERATURE: 97.4 F | RESPIRATION RATE: 17 BRPM | HEIGHT: 71 IN | WEIGHT: 155 LBS

## 2021-01-15 ASSESSMENT — PAIN DESCRIPTION - LOCATION: LOCATION: BACK

## 2021-01-15 NOTE — TELEPHONE ENCOUNTER
Patient's mother called to report that patient is having bloody stools with bright red blood. He also has been coughing and he will cough up dark brown liquid color. His chest hurts and also throat hurts. When he eats he begins to choke, patient is hardly eating. He has a puffy area to the the left of the navel and is hard with touch. These symptoms have been since Tuesday 1/12/2021.

## 2021-02-09 ENCOUNTER — TELEPHONE (OUTPATIENT)
Dept: INFECTIOUS DISEASES | Age: 41
End: 2021-02-09

## 2021-02-10 NOTE — TELEPHONE ENCOUNTER
I left him a VM back on 1/13/21   At that point had asked him to get some labs to help me interpret the MRI     I returned the call today and left a VM, reminded him about the labs    If he calls back, please ask him to get some labs done and make a follow-up with me    thanks

## 2021-04-01 ENCOUNTER — TELEPHONE (OUTPATIENT)
Dept: INFECTIOUS DISEASES | Age: 41
End: 2021-04-01

## 2021-04-01 NOTE — TELEPHONE ENCOUNTER
Returned the call but was not able to speak with Bety Feliciano, left my number     Spoke with Bety Feliciano. She will let Scar know we spoke and encouarge him to seek follow-up     Could you please call and see if we can get him scheduled in office?   Or have him go to ED if it answers and it sounds more acute  Thanks

## 2021-04-01 NOTE — TELEPHONE ENCOUNTER
Huang Ken with [facility] called to give Dr. Tess Trejo an update on this patient. Patient told the NP that he had an appointment with Dr. Tess Trejo today but he is not scheduled. NP states that this patient had a heart rate of 44 and they suggested to go to the ED. Patient declined. NP also states that this patient is still actively using Fentanyl. He is very evasive with her about his medical history. She states that this patient does not look good. She would be happy to speak with Dr. Tess Trejo.   Huang Ken can be reached at [phone]  She is out of the office on 4/2/2021

## 2021-04-08 NOTE — TELEPHONE ENCOUNTER
Called Annette (mom) patient was there and she passed the phone.  Scheduled for an in person visit on 4/20/21

## 2021-06-25 ENCOUNTER — HOSPITAL ENCOUNTER (OUTPATIENT)
Age: 41
Discharge: HOME | End: 2021-06-25
Payer: COMMERCIAL

## 2021-06-25 ENCOUNTER — HOSPITAL ENCOUNTER (OUTPATIENT)
Dept: GENERAL RADIOLOGY | Age: 41
Discharge: HOME | End: 2021-06-25
Payer: COMMERCIAL

## 2021-06-25 DIAGNOSIS — M25.552 HIP PAIN, BILATERAL: ICD-10-CM

## 2021-06-25 DIAGNOSIS — M25.551 HIP PAIN, BILATERAL: ICD-10-CM

## 2021-06-25 PROCEDURE — 73521 X-RAY EXAM HIPS BI 2 VIEWS: CPT

## 2021-09-02 ENCOUNTER — HOSPITAL ENCOUNTER (OUTPATIENT)
Age: 41
Discharge: HOME | End: 2021-09-02
Payer: COMMERCIAL

## 2021-09-02 DIAGNOSIS — M46.27 OSTEOMYELITIS OF VERTEBRA, LUMBOSACRAL REGION (HCC): ICD-10-CM

## 2021-09-02 LAB
A/G RATIO: 1.2 (ref 1.1–2.2)
ALBUMIN SERPL-MCNC: 4.3 G/DL (ref 3.4–5)
ALP BLD-CCNC: 128 U/L (ref 40–129)
ALT SERPL-CCNC: 51 U/L (ref 10–40)
ANION GAP SERPL CALCULATED.3IONS-SCNC: 9 MMOL/L (ref 3–16)
AST SERPL-CCNC: 39 U/L (ref 15–37)
BASOPHILS ABSOLUTE: 0.1 K/UL (ref 0–0.2)
BASOPHILS RELATIVE PERCENT: 1.2 %
BILIRUB SERPL-MCNC: <0.2 MG/DL (ref 0–1)
BUN BLDV-MCNC: 28 MG/DL (ref 7–20)
C-REACTIVE PROTEIN: 21.9 MG/L (ref 0–5.1)
CALCIUM SERPL-MCNC: 9.5 MG/DL (ref 8.3–10.6)
CHLORIDE BLD-SCNC: 102 MMOL/L (ref 99–110)
CO2: 26 MMOL/L (ref 21–32)
CREAT SERPL-MCNC: 1.1 MG/DL (ref 0.9–1.3)
EOSINOPHILS ABSOLUTE: 0.5 K/UL (ref 0–0.6)
EOSINOPHILS RELATIVE PERCENT: 4.8 %
GFR AFRICAN AMERICAN: >60
GFR NON-AFRICAN AMERICAN: >60
GLOBULIN: 3.6 G/DL
GLUCOSE BLD-MCNC: 102 MG/DL (ref 70–99)
HCT VFR BLD CALC: 36.5 % (ref 40.5–52.5)
HEMOGLOBIN: 12.4 G/DL (ref 13.5–17.5)
LYMPHOCYTES ABSOLUTE: 3.9 K/UL (ref 1–5.1)
LYMPHOCYTES RELATIVE PERCENT: 34.7 %
MCH RBC QN AUTO: 29.9 PG (ref 26–34)
MCHC RBC AUTO-ENTMCNC: 34 G/DL (ref 31–36)
MCV RBC AUTO: 87.8 FL (ref 80–100)
MONOCYTES ABSOLUTE: 0.8 K/UL (ref 0–1.3)
MONOCYTES RELATIVE PERCENT: 7.4 %
NEUTROPHILS ABSOLUTE: 5.8 K/UL (ref 1.7–7.7)
NEUTROPHILS RELATIVE PERCENT: 51.9 %
PDW BLD-RTO: 13.9 % (ref 12.4–15.4)
PLATELET # BLD: 334 K/UL (ref 135–450)
PMV BLD AUTO: 7.8 FL (ref 5–10.5)
POTASSIUM SERPL-SCNC: 4.4 MMOL/L (ref 3.5–5.1)
RBC # BLD: 4.16 M/UL (ref 4.2–5.9)
SEDIMENTATION RATE, ERYTHROCYTE: 12 MM/HR (ref 0–15)
SODIUM BLD-SCNC: 137 MMOL/L (ref 136–145)
TOTAL PROTEIN: 7.9 G/DL (ref 6.4–8.2)
WBC # BLD: 11.1 K/UL (ref 4–11)

## 2021-09-02 PROCEDURE — 85652 RBC SED RATE AUTOMATED: CPT

## 2021-09-02 PROCEDURE — 80053 COMPREHEN METABOLIC PANEL: CPT

## 2021-09-02 PROCEDURE — 85025 COMPLETE CBC W/AUTO DIFF WBC: CPT

## 2021-09-02 PROCEDURE — 36415 COLL VENOUS BLD VENIPUNCTURE: CPT

## 2021-09-02 PROCEDURE — 86140 C-REACTIVE PROTEIN: CPT

## 2021-09-02 PROCEDURE — 87040 BLOOD CULTURE FOR BACTERIA: CPT

## 2021-09-03 ENCOUNTER — TELEPHONE (OUTPATIENT)
Dept: INFECTIOUS DISEASES | Age: 41
End: 2021-09-03

## 2021-09-06 LAB
BLOOD CULTURE, ROUTINE: NORMAL
CULTURE, BLOOD 2: NORMAL

## 2021-09-09 ENCOUNTER — VIRTUAL VISIT (OUTPATIENT)
Dept: INFECTIOUS DISEASES | Age: 41
End: 2021-09-09
Payer: COMMERCIAL

## 2021-09-09 ENCOUNTER — TELEPHONE (OUTPATIENT)
Dept: INFECTIOUS DISEASES | Age: 41
End: 2021-09-09

## 2021-09-09 DIAGNOSIS — F19.90 IVDU (INTRAVENOUS DRUG USER): ICD-10-CM

## 2021-09-09 DIAGNOSIS — A49.02 MRSA INFECTION: ICD-10-CM

## 2021-09-09 DIAGNOSIS — M46.45 DISCITIS OF THORACOLUMBAR REGION: Primary | ICD-10-CM

## 2021-09-09 PROCEDURE — 99214 OFFICE O/P EST MOD 30 MIN: CPT | Performed by: INTERNAL MEDICINE

## 2021-09-09 PROCEDURE — G8427 DOCREV CUR MEDS BY ELIG CLIN: HCPCS | Performed by: INTERNAL MEDICINE

## 2021-09-09 NOTE — TELEPHONE ENCOUNTER
Please assist Chandan Patricia with MRI scheduling at [facility] and follow-up with me in 2 weeks or following MRI   Thanks

## 2021-09-09 NOTE — PROGRESS NOTES
Department of Internal Medicine  Infectious Diseases      Patient Name: Dc Quintana      Date of visit: 9/9/2021  SUBJECTIVE:  Mary Anne Aguilar  is a 41 y.o. male with history of IVDU, CAD, HCV infection, MRSA boils who requests telemedicine urns today for follow-up lumboscral discitis.     He has had fragmented care for this problem since 9/2020    I last saw him for this problem 1/2021  He requests telemedicine visit today for follow-up    Admission A 9/23-9/26/20 with MRI evidence of L5-S1 discitis and epidural phelgmon. Attempted disc biopsy terminated due to patient intolerance of procedure. He left AMA    Admitted [facility] 10/8-10/14/20  Disc biopsy negative  Had a BC posiive for MSA by DNA detection with routine BC result positive for S epidermidis. In house Vanc/cefepime and discharged on Linezolid x4 week supply. He took it for 2 weeks and then stopped due to concerns of SE with psych meds (wellbutrin). Outpatient ID follow-up 11/18/20 at which time dalbavancin infusion x2 was ordered, appears he had 1 infusion.           Then admitted [facility] through ED 11/24-11/27/20  MRI Lumbosacral spine - discitis/osteomyelitis with suspect small epidural abscess  Had another IR biopsy with negative cultures. DC from [facility] to SNF on 11/27/20 on IV Vanc with plan for 8 weeks  Patient left AMA 2d later    Admitted overnight early December and again left AMA       Got a new PCP who encouraged him to follow-up with ID re spine infection  Always have pain, some days worse. Nothing he can identify that exac the pain. Pain extends from scapula to neck. Hands go numb, aliza R hand. Entire hand. Pain also radiates down into waist.    Not aware of fever. No chills. +Night sweats. 3-4 weeks feeling more fatigue.     No abx for several months    Recent labs 9/2/21  BC x2 neg  ESR 12, CRP 21        REVIEW OF SYSTEMS:    CONSTITUTIONAL:   Per HPI   EYES:  negative for blurred vision, eye discharge, visual disturbance and icterus  HEENT:  negative for hearing loss, tinnitus, ear drainage, sinus pressure, nasal congestion, epistaxis and snoring  RESPIRATORY:  No cough or hemoptysis   CARDIOVASCULAR:  negative for chest pain, palpitations, exertional chest pressure/discomfort, edema, syncope  GASTROINTESTINAL:  negative for nausea, vomiting, diarrhea, constipation, blood in stool and abdominal pain  GENITOURINARY:  negative for frequency, dysuria, urinary incontinence, decreased urine volume, and hematuria  HEMATOLOGIC/LYMPHATIC:  negative for easy bruising, bleeding and lymphadenopathy  ALLERGIC/IMMUNOLOGIC:  negative for angioedema, anaphylaxis and drug reactions  ENDOCRINE:  negative for weight changes and diabetic symptoms including polyuria, polydipsia and polyphagia  MUSCULOSKELETAL:   Per HPI   Recurrent \"boils\" on buttocks which he \"pops\" and they resolve   NEUROLOGICAL:  negative for headaches, slurred speech  PSYCHIATRIC/BEHAVIORAL: negative for hallucinations, behavioral problems, confusion and agitation. Medications:    Current Outpatient Medications   Medication Sig Dispense Refill    traZODone (DESYREL) 100 MG tablet Take 100 mg by mouth nightly      lidocaine-prilocaine (EMLA) 2.5-2.5 % cream Apply to the most painful wounds once daily at time of dressing change, as needed for pain -- leave on for 20 minutes and then wipe off before applying antibiotic cream. 60 g 1    ibuprofen (ADVIL;MOTRIN) 800 MG tablet Take 800 mg by mouth every 8 hours as needed for Pain       melatonin 3 MG TABS tablet Take 1 tablet by mouth nightly 30 tablet 3    busPIRone (BUSPAR) 15 MG tablet Take 15 mg by mouth 3 times daily as needed Indications: Feeling Anxious      folic acid (FOLVITE) 1 MG tablet Take 1 mg by mouth daily      gabapentin (NEURONTIN) 600 MG tablet Take 600 mg by mouth 3 times daily.        buPROPion (WELLBUTRIN SR) 100 MG extended release tablet Take 100 mg by mouth 2 times daily      pantoprazole (PROTONIX) 40 MG tablet Take 40 mg by mouth daily      OLANZapine (ZYPREXA) 10 MG tablet Take 1 tablet by mouth nightly 30 tablet 0     No current facility-administered medications for this visit. Physical:  VITALS:  There were no vitals taken for this visit. Limited exam in the context of video visit  Walking with normal gait  alert, oriented, NAD  Skin:   Multiple excoriated lesions on the face c/w formication from amphetamines  Neck:   Symmetric        MICROBIOLOGY RESULTS   9/23/20 BC x2 neg   9/23/20 HIV screen neg   9/25/20 urine GC/CT neg   9/25/20 wound culture shoulder abscess MRSA   10/8/20 BC x1 MRSA by DNA detection   10/12/20 disc biopsy neg   11/24/20 BC x2 neg   11/25/20 disc biopsy neg, GS no org seen  12/5/20 BC x2 neg   12/23/20 wound culture arm MRSA     9/2/21 BC x2 neg           RADIOLOGY RESULTS:  MRI L spine 11/24/20  Impression   Abnormal appearance of the L5 and S1 levels. There is intervertebral disc   space narrowing with erosive endplate changes and small intervertebral fluid   collection. Diffuse signal abnormality is noted throughout the L5 and S1   vertebral bodies indicating marrow edema. On the postcontrast images, there   is enhancement of the intervertebral space as well as enhancement of the   epidural margin posterior to the L5-S1 levels and patchy enhancement of the   L5 and S1 vertebral bodies. The findings are compatible with   discitis/osteomyelitis with a suspect small epidural abscess.        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      Lumbosacral oste-discitis  MRSA presumed to be etiologic agent with MRSA isolated by DNA detection in a BC at [facility] 10/2020; disc biopsy x2 with negative culture however  Fragmented care at several different facilities over 3.5 months  Persistent mid to low back pain is concerning for persistent infection      HIV ab 8/14/20 negative   Chronic HCV infection, +RNA 4/16/20      NKDA        Plan     Labs are reassuring but persistent pain is concerning for infection in the spine   MRI T/L spine ordered    F/u with me in 2 weeks or after MRI     No abx indicated at this time         Andrew Valencia MD    [address] [place] [address], was evaluated through a synchronous (real-time) audio-video encounter. The patient (or guardian if applicable) is aware that this is a billable service. Verbal consent to proceed has been obtained within the past 12 months. The visit was conducted pursuant to the emergency declaration under the [address] [place] [address], [address] [place] [address] authority and the [employer] General Act. Patient identification was verified, and a caregiver was present when appropriate. The patient was located in a state where the provider was credentialed to provide care. Total time spent for this encounter: Not billed by time    --Andrew Valencia MD on 9/9/2021 at 1:35 PM    An electronic signature was used to authenticate this note.

## 2021-09-09 NOTE — TELEPHONE ENCOUNTER
Patient is scheduled for MRI on 9/17/2021 at 7am. Patient advised. Patient also scheduled for a f/u with Dr. Gomez Drought 9/21/21.

## 2021-09-21 DIAGNOSIS — M54.14 THORACIC RADICULOPATHY: Primary | ICD-10-CM

## 2021-09-21 NOTE — PROGRESS NOTES
The MRI thoracic spine was denied and they are asking if the provider would like to complete a peer review.  If so, call [phone] and reference case #[id number]. If the provider does not wish to complete this today please let me know so that we can cancel this portion of the exam.  The MRI Lumbar spine was approved and the patient may proceed with this.           Brendan Noonan  I re-entered the order with qualifying diagnosis thoracic radiculopathy  Can you please let me find out with insurance, should be a qualifying diagnosis

## 2021-09-22 ENCOUNTER — HOSPITAL ENCOUNTER (EMERGENCY)
Age: 41
Discharge: ANOTHER HOSPITAL | End: 2021-09-23
Attending: EMERGENCY MEDICINE
Payer: COMMERCIAL

## 2021-09-22 ENCOUNTER — APPOINTMENT (OUTPATIENT)
Dept: CT IMAGING | Age: 41
End: 2021-09-22
Payer: COMMERCIAL

## 2021-09-22 ENCOUNTER — APPOINTMENT (OUTPATIENT)
Dept: ULTRASOUND IMAGING | Age: 41
End: 2021-09-22
Payer: COMMERCIAL

## 2021-09-22 ENCOUNTER — HOSPITAL ENCOUNTER (OUTPATIENT)
Dept: MRI IMAGING | Age: 41
Discharge: HOME | End: 2021-09-22
Payer: COMMERCIAL

## 2021-09-22 ENCOUNTER — APPOINTMENT (OUTPATIENT)
Dept: MRI IMAGING | Age: 41
End: 2021-09-22
Payer: COMMERCIAL

## 2021-09-22 DIAGNOSIS — Z87.39 HX OF DISCITIS: ICD-10-CM

## 2021-09-22 DIAGNOSIS — G89.29 CHRONIC LOW BACK PAIN, UNSPECIFIED BACK PAIN LATERALITY, UNSPECIFIED WHETHER SCIATICA PRESENT: ICD-10-CM

## 2021-09-22 DIAGNOSIS — F19.90 IVDU (INTRAVENOUS DRUG USER): ICD-10-CM

## 2021-09-22 DIAGNOSIS — M54.50 CHRONIC LOW BACK PAIN, UNSPECIFIED BACK PAIN LATERALITY, UNSPECIFIED WHETHER SCIATICA PRESENT: ICD-10-CM

## 2021-09-22 DIAGNOSIS — M46.45 DISCITIS OF THORACOLUMBAR REGION: ICD-10-CM

## 2021-09-22 DIAGNOSIS — N50.811 PAIN IN RIGHT TESTICLE: ICD-10-CM

## 2021-09-22 DIAGNOSIS — R70.0 ELEVATED SED RATE: ICD-10-CM

## 2021-09-22 DIAGNOSIS — R32 URINARY INCONTINENCE, UNSPECIFIED TYPE: Primary | ICD-10-CM

## 2021-09-22 DIAGNOSIS — R22.1 MASS IN NECK: ICD-10-CM

## 2021-09-22 PROBLEM — M46.40 DISCITIS: Status: ACTIVE | Noted: 2021-09-22

## 2021-09-22 LAB
A/G RATIO: 1.3 (ref 1.1–2.2)
ALBUMIN SERPL-MCNC: 4.4 G/DL (ref 3.4–5)
ALP BLD-CCNC: 141 U/L (ref 40–129)
ALT SERPL-CCNC: 39 U/L (ref 10–40)
ANION GAP SERPL CALCULATED.3IONS-SCNC: 8 MMOL/L (ref 3–16)
AST SERPL-CCNC: 32 U/L (ref 15–37)
BASOPHILS ABSOLUTE: 0.1 K/UL (ref 0–0.2)
BASOPHILS RELATIVE PERCENT: 0.8 %
BILIRUB SERPL-MCNC: <0.2 MG/DL (ref 0–1)
BUN BLDV-MCNC: 19 MG/DL (ref 7–20)
CALCIUM SERPL-MCNC: 9.3 MG/DL (ref 8.3–10.6)
CHLORIDE BLD-SCNC: 102 MMOL/L (ref 99–110)
CO2: 26 MMOL/L (ref 21–32)
CREAT SERPL-MCNC: 1.1 MG/DL (ref 0.9–1.3)
EOSINOPHILS ABSOLUTE: 0.2 K/UL (ref 0–0.6)
EOSINOPHILS RELATIVE PERCENT: 2.3 %
GFR AFRICAN AMERICAN: >60
GFR NON-AFRICAN AMERICAN: >60
GLOBULIN: 3.4 G/DL
GLUCOSE BLD-MCNC: 92 MG/DL (ref 70–99)
HCT VFR BLD CALC: 37.5 % (ref 40.5–52.5)
HEMOGLOBIN: 12.5 G/DL (ref 13.5–17.5)
LACTIC ACID, SEPSIS: 1.3 MMOL/L (ref 0.4–1.9)
LYMPHOCYTES ABSOLUTE: 2.1 K/UL (ref 1–5.1)
LYMPHOCYTES RELATIVE PERCENT: 18.9 %
MCH RBC QN AUTO: 29.6 PG (ref 26–34)
MCHC RBC AUTO-ENTMCNC: 33.4 G/DL (ref 31–36)
MCV RBC AUTO: 88.7 FL (ref 80–100)
MONOCYTES ABSOLUTE: 0.7 K/UL (ref 0–1.3)
MONOCYTES RELATIVE PERCENT: 6.8 %
NEUTROPHILS ABSOLUTE: 7.8 K/UL (ref 1.7–7.7)
NEUTROPHILS RELATIVE PERCENT: 71.2 %
PDW BLD-RTO: 14.2 % (ref 12.4–15.4)
PLATELET # BLD: 364 K/UL (ref 135–450)
PMV BLD AUTO: 7.6 FL (ref 5–10.5)
POTASSIUM REFLEX MAGNESIUM: 4.7 MMOL/L (ref 3.5–5.1)
RBC # BLD: 4.23 M/UL (ref 4.2–5.9)
SEDIMENTATION RATE, ERYTHROCYTE: 35 MM/HR (ref 0–15)
SODIUM BLD-SCNC: 136 MMOL/L (ref 136–145)
TOTAL PROTEIN: 7.8 G/DL (ref 6.4–8.2)
WBC # BLD: 10.9 K/UL (ref 4–11)

## 2021-09-22 PROCEDURE — 6360000004 HC RX CONTRAST MEDICATION: Performed by: INTERNAL MEDICINE

## 2021-09-22 PROCEDURE — A9579 GAD-BASE MR CONTRAST NOS,1ML: HCPCS | Performed by: INTERNAL MEDICINE

## 2021-09-22 PROCEDURE — 6370000000 HC RX 637 (ALT 250 FOR IP): Performed by: NURSE PRACTITIONER

## 2021-09-22 PROCEDURE — 72158 MRI LUMBAR SPINE W/O & W/DYE: CPT

## 2021-09-22 PROCEDURE — 87040 BLOOD CULTURE FOR BACTERIA: CPT

## 2021-09-22 PROCEDURE — 87150 DNA/RNA AMPLIFIED PROBE: CPT

## 2021-09-22 PROCEDURE — 85025 COMPLETE CBC W/AUTO DIFF WBC: CPT

## 2021-09-22 PROCEDURE — 72157 MRI CHEST SPINE W/O & W/DYE: CPT

## 2021-09-22 PROCEDURE — 6360000004 HC RX CONTRAST MEDICATION: Performed by: NURSE PRACTITIONER

## 2021-09-22 PROCEDURE — 93975 VASCULAR STUDY: CPT

## 2021-09-22 PROCEDURE — 93005 ELECTROCARDIOGRAM TRACING: CPT | Performed by: NURSE PRACTITIONER

## 2021-09-22 PROCEDURE — 86140 C-REACTIVE PROTEIN: CPT

## 2021-09-22 PROCEDURE — 80053 COMPREHEN METABOLIC PANEL: CPT

## 2021-09-22 PROCEDURE — 99284 EMERGENCY DEPT VISIT MOD MDM: CPT

## 2021-09-22 PROCEDURE — 83605 ASSAY OF LACTIC ACID: CPT

## 2021-09-22 PROCEDURE — 85652 RBC SED RATE AUTOMATED: CPT

## 2021-09-22 PROCEDURE — A9579 GAD-BASE MR CONTRAST NOS,1ML: HCPCS | Performed by: NURSE PRACTITIONER

## 2021-09-22 PROCEDURE — 76870 US EXAM SCROTUM: CPT

## 2021-09-22 RX ORDER — ACETAMINOPHEN 325 MG/1
650 TABLET ORAL ONCE
Status: COMPLETED | OUTPATIENT
Start: 2021-09-22 | End: 2021-09-22

## 2021-09-22 RX ADMIN — GADOTERIDOL 13 ML: 279.3 INJECTION, SOLUTION INTRAVENOUS at 23:46

## 2021-09-22 RX ADMIN — GADOTERIDOL 14 ML: 279.3 INJECTION, SOLUTION INTRAVENOUS at 16:54

## 2021-09-22 RX ADMIN — ACETAMINOPHEN 650 MG: 325 TABLET ORAL at 22:30

## 2021-09-22 ASSESSMENT — ENCOUNTER SYMPTOMS
COLOR CHANGE: 0
ABDOMINAL PAIN: 0
RHINORRHEA: 0
BACK PAIN: 1
SORE THROAT: 0
SHORTNESS OF BREATH: 0

## 2021-09-22 ASSESSMENT — PAIN SCALES - GENERAL
PAINLEVEL_OUTOF10: 7
PAINLEVEL_OUTOF10: 7

## 2021-09-22 ASSESSMENT — PAIN DESCRIPTION - LOCATION: LOCATION: BACK

## 2021-09-22 NOTE — ED TRIAGE NOTES
Pt arrived via EMS from PD car after being arrested, c/o back pain that makes it hard to breath per pt, A/0 x4, GCS 15, speaking in full sentences, able to provide own medical hx, placed on monitor continue to assess

## 2021-09-22 NOTE — ED PROVIDER NOTES
ED Attending Attestation Note    This patient was seen by the advance practice provider. I have seen and examined the patient. I agree with the workup, evaluation, management, and diagnosis. The care plan has been discussed. My assessment reveals 41 y.o. male who presents for back pain and urinary incontinence. Patient reports history of osteomyelitis of the spine. He states he was previously admitted to a nursing facility for MRSA infection of the spine. He states he wasn't satisfied with the care he was getting at the nursing facility, and he left. He hasn't pursued follow up with ID but states he intended to. One week ago, he developed difficulty urinating and occasional episodes of urinary incontinence. Complains of right sided testicular pain. Complains of white penile discharge. Patient apparently underwent MRI of the lumbar spine earlier today but left before obtaining imaging of the thoracic spine. The lumbar spine imaging showed evidence of previously diagnosed discitis. There is a small enhancing lesion in the right iliac bone, indeterminant, possibly atypical hemangioma. My focused exam:   41 y.o. male, nontoxic, NAD  There is a left sided submental mass without tenderness or fluctuance or overlying erythema  No clear intraoral infection however dentition is poor  No woody induration at floor of mouth, not concerning for Kayden's angina  No tenderness to palpation in C/T/L spine  Full/symmetric strength bilateral ankle dorsi/plantarflexion and great toe dorsiflexion  Normal external male genitalia. There is no discharge from the penile meatus. There are no external lesions. The testicles are descended bilaterally. There is testicular tenderness at the right testicle. No testicular masses are appreciated. There is no inguinal lymphadenopathy. Given urinary complaints, will seek to facilitate MRI of the thoracic spine that was not completed previously.   Given testicular complaint will obtain ultrasound of the scrotum. Will obtain CT soft tissue of the neck for evaluation of the submental mass. Anticipate admission versus transfer for further treatment of known discitis. EKG interpreted by myself. Rate: normal  Rhythm: NSR  Axis: normal  Intervals: within normal limits  ST Segments: no acute abnormality  T waves: no acute abnormality  Comparison: Compared to 12/9/20, there is no significant change  Impression: NSR with no acute abnormality       For further details of the patient's emergency department visit, please see the advanced practice provider's documentation. mAi Williamson MD     This report has been produced using speech recognition software and may contain errors related to that system including errors in grammar, punctuation, and spelling, as well as words and phrases that may be inappropriate. If there are any questions or concerns please feel free to contact the dictating provider for clarification.        Ami Williamson MD  09/22/21 9653

## 2021-09-23 ENCOUNTER — HOSPITAL ENCOUNTER (INPATIENT)
Age: 41
LOS: 1 days | Discharge: LEFT BEFORE DISCHARGE | DRG: 347 | End: 2021-09-24
Attending: INTERNAL MEDICINE | Admitting: INTERNAL MEDICINE
Payer: COMMERCIAL

## 2021-09-23 ENCOUNTER — TELEPHONE (OUTPATIENT)
Dept: INFECTIOUS DISEASES | Age: 41
End: 2021-09-23

## 2021-09-23 ENCOUNTER — APPOINTMENT (OUTPATIENT)
Dept: CT IMAGING | Age: 41
End: 2021-09-23
Payer: COMMERCIAL

## 2021-09-23 VITALS
TEMPERATURE: 98.9 F | OXYGEN SATURATION: 100 % | SYSTOLIC BLOOD PRESSURE: 121 MMHG | HEIGHT: 69 IN | WEIGHT: 145 LBS | DIASTOLIC BLOOD PRESSURE: 78 MMHG | HEART RATE: 78 BPM | BODY MASS INDEX: 21.48 KG/M2 | RESPIRATION RATE: 16 BRPM

## 2021-09-23 PROBLEM — M54.50 CHRONIC BILATERAL LOW BACK PAIN WITHOUT SCIATICA: Status: ACTIVE | Noted: 2021-09-23

## 2021-09-23 PROBLEM — F19.90 SUBSTANCE USE DISORDER: Status: ACTIVE | Noted: 2021-09-23

## 2021-09-23 PROBLEM — G89.29 CHRONIC BILATERAL LOW BACK PAIN WITHOUT SCIATICA: Status: ACTIVE | Noted: 2021-09-23

## 2021-09-23 LAB
AMPHETAMINE SCREEN, URINE: POSITIVE
ANION GAP SERPL CALCULATED.3IONS-SCNC: 12 MMOL/L (ref 3–16)
BARBITURATE SCREEN URINE: ABNORMAL
BASOPHILS ABSOLUTE: 0.1 K/UL (ref 0–0.2)
BASOPHILS RELATIVE PERCENT: 1.1 %
BENZODIAZEPINE SCREEN, URINE: ABNORMAL
BILIRUBIN URINE: NEGATIVE
BLOOD, URINE: NEGATIVE
BUN BLDV-MCNC: 17 MG/DL (ref 7–20)
C-REACTIVE PROTEIN: 13.3 MG/L (ref 0–5.1)
C-REACTIVE PROTEIN: 15.7 MG/L (ref 0–5.1)
CALCIUM SERPL-MCNC: 9.1 MG/DL (ref 8.3–10.6)
CANNABINOID SCREEN URINE: POSITIVE
CHLORIDE BLD-SCNC: 100 MMOL/L (ref 99–110)
CLARITY: CLEAR
CO2: 24 MMOL/L (ref 21–32)
COCAINE METABOLITE SCREEN URINE: ABNORMAL
COLOR: YELLOW
CREAT SERPL-MCNC: 1 MG/DL (ref 0.9–1.3)
EKG ATRIAL RATE: 81 BPM
EKG DIAGNOSIS: NORMAL
EKG P AXIS: 80 DEGREES
EKG P-R INTERVAL: 156 MS
EKG Q-T INTERVAL: 378 MS
EKG QRS DURATION: 86 MS
EKG QTC CALCULATION (BAZETT): 439 MS
EKG R AXIS: 56 DEGREES
EKG T AXIS: 78 DEGREES
EKG VENTRICULAR RATE: 81 BPM
EOSINOPHILS ABSOLUTE: 0.3 K/UL (ref 0–0.6)
EOSINOPHILS RELATIVE PERCENT: 3.3 %
GFR AFRICAN AMERICAN: >60
GFR NON-AFRICAN AMERICAN: >60
GLUCOSE BLD-MCNC: 89 MG/DL (ref 70–99)
GLUCOSE URINE: 250 MG/DL
HCT VFR BLD CALC: 39.6 % (ref 40.5–52.5)
HEMOGLOBIN: 13.2 G/DL (ref 13.5–17.5)
KETONES, URINE: NEGATIVE MG/DL
LEUKOCYTE ESTERASE, URINE: NEGATIVE
LYMPHOCYTES ABSOLUTE: 3.1 K/UL (ref 1–5.1)
LYMPHOCYTES RELATIVE PERCENT: 35 %
Lab: ABNORMAL
MCH RBC QN AUTO: 29.2 PG (ref 26–34)
MCHC RBC AUTO-ENTMCNC: 33.4 G/DL (ref 31–36)
MCV RBC AUTO: 87.5 FL (ref 80–100)
METHADONE SCREEN, URINE: ABNORMAL
MICROSCOPIC EXAMINATION: ABNORMAL
MONOCYTES ABSOLUTE: 0.8 K/UL (ref 0–1.3)
MONOCYTES RELATIVE PERCENT: 9.3 %
NEUTROPHILS ABSOLUTE: 4.6 K/UL (ref 1.7–7.7)
NEUTROPHILS RELATIVE PERCENT: 51.3 %
NITRITE, URINE: NEGATIVE
OPIATE SCREEN URINE: ABNORMAL
OXYCODONE URINE: ABNORMAL
PDW BLD-RTO: 14.1 % (ref 12.4–15.4)
PH UA: 5.5 (ref 5–8)
PH UA: 6.5
PHENCYCLIDINE SCREEN URINE: ABNORMAL
PLATELET # BLD: 353 K/UL (ref 135–450)
PMV BLD AUTO: 7.5 FL (ref 5–10.5)
POTASSIUM REFLEX MAGNESIUM: 3.7 MMOL/L (ref 3.5–5.1)
PROCALCITONIN: 0.24 NG/ML (ref 0–0.15)
PROPOXYPHENE SCREEN: ABNORMAL
PROTEIN UA: NEGATIVE MG/DL
RBC # BLD: 4.52 M/UL (ref 4.2–5.9)
REPORT: NORMAL
SEDIMENTATION RATE, ERYTHROCYTE: 25 MM/HR (ref 0–15)
SODIUM BLD-SCNC: 136 MMOL/L (ref 136–145)
SPECIFIC GRAVITY UA: 1.01 (ref 1–1.03)
URINE TYPE: ABNORMAL
UROBILINOGEN, URINE: 0.2 E.U./DL
WBC # BLD: 8.9 K/UL (ref 4–11)

## 2021-09-23 PROCEDURE — 70491 CT SOFT TISSUE NECK W/DYE: CPT

## 2021-09-23 PROCEDURE — 2060000000 HC ICU INTERMEDIATE R&B

## 2021-09-23 PROCEDURE — 6370000000 HC RX 637 (ALT 250 FOR IP): Performed by: INTERNAL MEDICINE

## 2021-09-23 PROCEDURE — 6360000002 HC RX W HCPCS: Performed by: INTERNAL MEDICINE

## 2021-09-23 PROCEDURE — 6360000002 HC RX W HCPCS: Performed by: NURSE PRACTITIONER

## 2021-09-23 PROCEDURE — 84145 PROCALCITONIN (PCT): CPT

## 2021-09-23 PROCEDURE — 86140 C-REACTIVE PROTEIN: CPT

## 2021-09-23 PROCEDURE — 85652 RBC SED RATE AUTOMATED: CPT

## 2021-09-23 PROCEDURE — 87040 BLOOD CULTURE FOR BACTERIA: CPT

## 2021-09-23 PROCEDURE — 6370000000 HC RX 637 (ALT 250 FOR IP): Performed by: NURSE PRACTITIONER

## 2021-09-23 PROCEDURE — 87086 URINE CULTURE/COLONY COUNT: CPT

## 2021-09-23 PROCEDURE — 2580000003 HC RX 258: Performed by: NURSE PRACTITIONER

## 2021-09-23 PROCEDURE — 36415 COLL VENOUS BLD VENIPUNCTURE: CPT

## 2021-09-23 PROCEDURE — 2580000003 HC RX 258: Performed by: INTERNAL MEDICINE

## 2021-09-23 PROCEDURE — 93010 ELECTROCARDIOGRAM REPORT: CPT | Performed by: INTERNAL MEDICINE

## 2021-09-23 PROCEDURE — 80048 BASIC METABOLIC PNL TOTAL CA: CPT

## 2021-09-23 PROCEDURE — 80307 DRUG TEST PRSMV CHEM ANLYZR: CPT

## 2021-09-23 PROCEDURE — 85025 COMPLETE CBC W/AUTO DIFF WBC: CPT

## 2021-09-23 PROCEDURE — 6360000004 HC RX CONTRAST MEDICATION: Performed by: NURSE PRACTITIONER

## 2021-09-23 PROCEDURE — 81003 URINALYSIS AUTO W/O SCOPE: CPT

## 2021-09-23 PROCEDURE — 99255 IP/OBS CONSLTJ NEW/EST HI 80: CPT | Performed by: INTERNAL MEDICINE

## 2021-09-23 RX ORDER — LANOLIN ALCOHOL/MO/W.PET/CERES
3 CREAM (GRAM) TOPICAL NIGHTLY
Status: DISCONTINUED | OUTPATIENT
Start: 2021-09-23 | End: 2021-09-24 | Stop reason: HOSPADM

## 2021-09-23 RX ORDER — ACETAMINOPHEN 325 MG/1
650 TABLET ORAL EVERY 4 HOURS PRN
Status: DISCONTINUED | OUTPATIENT
Start: 2021-09-23 | End: 2021-09-23

## 2021-09-23 RX ORDER — TRAZODONE HYDROCHLORIDE 100 MG/1
100 TABLET ORAL NIGHTLY
Status: DISCONTINUED | OUTPATIENT
Start: 2021-09-23 | End: 2021-09-24 | Stop reason: HOSPADM

## 2021-09-23 RX ORDER — SODIUM CHLORIDE 9 MG/ML
INJECTION, SOLUTION INTRAVENOUS CONTINUOUS
Status: DISCONTINUED | OUTPATIENT
Start: 2021-09-23 | End: 2021-09-24 | Stop reason: HOSPADM

## 2021-09-23 RX ORDER — SODIUM CHLORIDE 0.9 % (FLUSH) 0.9 %
10 SYRINGE (ML) INJECTION PRN
Status: DISCONTINUED | OUTPATIENT
Start: 2021-09-23 | End: 2021-09-24 | Stop reason: HOSPADM

## 2021-09-23 RX ORDER — ACETAMINOPHEN 650 MG/1
650 SUPPOSITORY RECTAL EVERY 4 HOURS PRN
Status: DISCONTINUED | OUTPATIENT
Start: 2021-09-23 | End: 2021-09-23

## 2021-09-23 RX ORDER — POLYETHYLENE GLYCOL 3350 17 G/17G
17 POWDER, FOR SOLUTION ORAL DAILY PRN
Status: DISCONTINUED | OUTPATIENT
Start: 2021-09-23 | End: 2021-09-24 | Stop reason: HOSPADM

## 2021-09-23 RX ORDER — DIAZEPAM 5 MG/1
5 TABLET ORAL EVERY 6 HOURS PRN
Status: DISCONTINUED | OUTPATIENT
Start: 2021-09-23 | End: 2021-09-24 | Stop reason: HOSPADM

## 2021-09-23 RX ORDER — PANTOPRAZOLE SODIUM 40 MG/1
40 TABLET, DELAYED RELEASE ORAL DAILY
Status: DISCONTINUED | OUTPATIENT
Start: 2021-09-23 | End: 2021-09-24 | Stop reason: HOSPADM

## 2021-09-23 RX ORDER — LEVOFLOXACIN 500 MG/1
500 TABLET, FILM COATED ORAL DAILY
Status: DISCONTINUED | OUTPATIENT
Start: 2021-09-23 | End: 2021-09-24 | Stop reason: HOSPADM

## 2021-09-23 RX ORDER — ONDANSETRON 2 MG/ML
4 INJECTION INTRAMUSCULAR; INTRAVENOUS EVERY 4 HOURS PRN
Status: DISCONTINUED | OUTPATIENT
Start: 2021-09-23 | End: 2021-09-24 | Stop reason: HOSPADM

## 2021-09-23 RX ORDER — SODIUM CHLORIDE 9 MG/ML
25 INJECTION, SOLUTION INTRAVENOUS PRN
Status: DISCONTINUED | OUTPATIENT
Start: 2021-09-23 | End: 2021-09-24 | Stop reason: HOSPADM

## 2021-09-23 RX ORDER — METHOCARBAMOL 750 MG/1
1500 TABLET, FILM COATED ORAL EVERY 8 HOURS SCHEDULED
Status: DISCONTINUED | OUTPATIENT
Start: 2021-09-24 | End: 2021-09-24 | Stop reason: HOSPADM

## 2021-09-23 RX ORDER — IBUPROFEN 600 MG/1
600 TABLET ORAL EVERY 6 HOURS PRN
Status: DISCONTINUED | OUTPATIENT
Start: 2021-09-23 | End: 2021-09-24 | Stop reason: HOSPADM

## 2021-09-23 RX ORDER — GABAPENTIN 600 MG/1
600 TABLET ORAL 3 TIMES DAILY
Status: DISCONTINUED | OUTPATIENT
Start: 2021-09-23 | End: 2021-09-24 | Stop reason: HOSPADM

## 2021-09-23 RX ORDER — NICOTINE 21 MG/24HR
1 PATCH, TRANSDERMAL 24 HOURS TRANSDERMAL DAILY
Status: DISCONTINUED | OUTPATIENT
Start: 2021-09-23 | End: 2021-09-24 | Stop reason: HOSPADM

## 2021-09-23 RX ORDER — ACETAMINOPHEN 500 MG
1000 TABLET ORAL EVERY 6 HOURS SCHEDULED
Status: DISCONTINUED | OUTPATIENT
Start: 2021-09-23 | End: 2021-09-24 | Stop reason: HOSPADM

## 2021-09-23 RX ORDER — FOLIC ACID 1 MG/1
1 TABLET ORAL DAILY
Status: DISCONTINUED | OUTPATIENT
Start: 2021-09-23 | End: 2021-09-24 | Stop reason: HOSPADM

## 2021-09-23 RX ORDER — OLANZAPINE 5 MG/1
10 TABLET ORAL NIGHTLY
Status: DISCONTINUED | OUTPATIENT
Start: 2021-09-23 | End: 2021-09-24 | Stop reason: HOSPADM

## 2021-09-23 RX ORDER — LIDOCAINE 4 G/G
2 PATCH TOPICAL DAILY
Status: DISCONTINUED | OUTPATIENT
Start: 2021-09-23 | End: 2021-09-24 | Stop reason: HOSPADM

## 2021-09-23 RX ORDER — BUPROPION HYDROCHLORIDE 100 MG/1
100 TABLET, EXTENDED RELEASE ORAL 2 TIMES DAILY
Status: DISCONTINUED | OUTPATIENT
Start: 2021-09-23 | End: 2021-09-24 | Stop reason: HOSPADM

## 2021-09-23 RX ORDER — OXYCODONE HYDROCHLORIDE 5 MG/1
5 TABLET ORAL EVERY 4 HOURS PRN
Status: DISCONTINUED | OUTPATIENT
Start: 2021-09-23 | End: 2021-09-24 | Stop reason: HOSPADM

## 2021-09-23 RX ORDER — MORPHINE SULFATE 2 MG/ML
2 INJECTION, SOLUTION INTRAMUSCULAR; INTRAVENOUS EVERY 4 HOURS PRN
Status: DISCONTINUED | OUTPATIENT
Start: 2021-09-23 | End: 2021-09-23

## 2021-09-23 RX ORDER — SODIUM CHLORIDE 0.9 % (FLUSH) 0.9 %
10 SYRINGE (ML) INJECTION EVERY 12 HOURS SCHEDULED
Status: DISCONTINUED | OUTPATIENT
Start: 2021-09-23 | End: 2021-09-24 | Stop reason: HOSPADM

## 2021-09-23 RX ADMIN — SODIUM CHLORIDE: 9 INJECTION, SOLUTION INTRAVENOUS at 02:37

## 2021-09-23 RX ADMIN — Medication 3 MG: at 20:47

## 2021-09-23 RX ADMIN — OXYCODONE 5 MG: 5 TABLET ORAL at 16:42

## 2021-09-23 RX ADMIN — ACETAMINOPHEN 1000 MG: 500 TABLET, FILM COATED ORAL at 13:11

## 2021-09-23 RX ADMIN — DIAZEPAM 5 MG: 5 TABLET ORAL at 10:04

## 2021-09-23 RX ADMIN — ACETAMINOPHEN 1000 MG: 500 TABLET, FILM COATED ORAL at 18:01

## 2021-09-23 RX ADMIN — Medication 10 ML: at 20:47

## 2021-09-23 RX ADMIN — BUPROPION HYDROCHLORIDE 100 MG: 100 TABLET, FILM COATED, EXTENDED RELEASE ORAL at 12:30

## 2021-09-23 RX ADMIN — DIAZEPAM 5 MG: 5 TABLET ORAL at 18:06

## 2021-09-23 RX ADMIN — METHOCARBAMOL 1000 MG: 100 INJECTION, SOLUTION INTRAMUSCULAR; INTRAVENOUS at 10:13

## 2021-09-23 RX ADMIN — BUPROPION HYDROCHLORIDE 100 MG: 100 TABLET, FILM COATED, EXTENDED RELEASE ORAL at 20:47

## 2021-09-23 RX ADMIN — LEVOFLOXACIN 500 MG: 500 TABLET, FILM COATED ORAL at 16:42

## 2021-09-23 RX ADMIN — IOPAMIDOL 75 ML: 755 INJECTION, SOLUTION INTRAVENOUS at 00:11

## 2021-09-23 RX ADMIN — Medication 10 ML: at 10:13

## 2021-09-23 RX ADMIN — OLANZAPINE 10 MG: 5 TABLET, FILM COATED ORAL at 20:47

## 2021-09-23 RX ADMIN — OXYCODONE 5 MG: 5 TABLET ORAL at 20:47

## 2021-09-23 RX ADMIN — GABAPENTIN 600 MG: 600 TABLET, FILM COATED ORAL at 12:30

## 2021-09-23 RX ADMIN — FOLIC ACID 1 MG: 1 TABLET ORAL at 12:30

## 2021-09-23 RX ADMIN — GABAPENTIN 600 MG: 600 TABLET, FILM COATED ORAL at 20:47

## 2021-09-23 RX ADMIN — BUSPIRONE HYDROCHLORIDE 15 MG: 5 TABLET ORAL at 12:30

## 2021-09-23 RX ADMIN — ENOXAPARIN SODIUM 40 MG: 40 INJECTION SUBCUTANEOUS at 10:05

## 2021-09-23 RX ADMIN — TRAZODONE HYDROCHLORIDE 100 MG: 100 TABLET ORAL at 20:47

## 2021-09-23 RX ADMIN — PANTOPRAZOLE SODIUM 40 MG: 40 TABLET, DELAYED RELEASE ORAL at 12:30

## 2021-09-23 RX ADMIN — METHOCARBAMOL 1000 MG: 100 INJECTION, SOLUTION INTRAMUSCULAR; INTRAVENOUS at 20:55

## 2021-09-23 ASSESSMENT — PAIN DESCRIPTION - PAIN TYPE
TYPE: ACUTE PAIN
TYPE: CHRONIC PAIN
TYPE: ACUTE PAIN

## 2021-09-23 ASSESSMENT — PAIN SCALES - GENERAL
PAINLEVEL_OUTOF10: 5
PAINLEVEL_OUTOF10: 6
PAINLEVEL_OUTOF10: 0
PAINLEVEL_OUTOF10: 6
PAINLEVEL_OUTOF10: 7
PAINLEVEL_OUTOF10: 4
PAINLEVEL_OUTOF10: 3

## 2021-09-23 ASSESSMENT — PAIN DESCRIPTION - FREQUENCY
FREQUENCY: CONTINUOUS
FREQUENCY: CONTINUOUS

## 2021-09-23 ASSESSMENT — PAIN DESCRIPTION - LOCATION
LOCATION: BACK

## 2021-09-23 ASSESSMENT — PAIN DESCRIPTION - ORIENTATION
ORIENTATION: MID;POSTERIOR
ORIENTATION: POSTERIOR

## 2021-09-23 ASSESSMENT — PAIN DESCRIPTION - DESCRIPTORS
DESCRIPTORS: TIGHTNESS
DESCRIPTORS: STABBING
DESCRIPTORS: ACHING

## 2021-09-23 ASSESSMENT — ENCOUNTER SYMPTOMS
BACK PAIN: 1
SORE THROAT: 1
BLOOD IN STOOL: 1
DIARRHEA: 1
SHORTNESS OF BREATH: 1
ABDOMINAL PAIN: 1

## 2021-09-23 ASSESSMENT — PAIN DESCRIPTION - ONSET
ONSET: ON-GOING
ONSET: ON-GOING

## 2021-09-23 ASSESSMENT — PAIN - FUNCTIONAL ASSESSMENT: PAIN_FUNCTIONAL_ASSESSMENT: PREVENTS OR INTERFERES SOME ACTIVE ACTIVITIES AND ADLS

## 2021-09-23 ASSESSMENT — PAIN DESCRIPTION - PROGRESSION
CLINICAL_PROGRESSION: NOT CHANGED
CLINICAL_PROGRESSION: NOT CHANGED

## 2021-09-23 NOTE — ED PROVIDER NOTES
[address] ED  EMERGENCY DEPARTMENT ENCOUNTER        Pt Name: Dannie Patel  MRN: [id number]  [place] 1980  Date of evaluation: 9/22/2021  Provider: JORDY Hong CNP  PCP: JORDY Wesley CNP  ED Attending: Marrion Pallas, MD    [address] [place] [address]       Chief Complaint   Patient presents with    Back Pain       HISTORY OF PRESENT ILLNESS   (Location/Symptom, Timing/Onset, Context/Setting, Quality, Duration, Modifying Factors, Severity)  Note limiting factors. Dannie Patel is a 41 y.o. male for back pain. Onset was today. Context includes patient was brought in by the police after he was on his way to [facility]. Patient reports that he started complaining of back pain. Patient reports that he is having left-sided chest pain that radiates to his shoulder blades, upper and lower back pain, testicular pain, and a mass to the right side of his neck. Patient reports that he was receiving an outpatient MRI of his thoracic and lumbar spine today when he reportedly had to leave to  his child. Patient states that he only received the lumbar portion of the MRI. Patient had these orders placed per his primary care doctor to rule out a epidural abscess due to his IV drug use. Patient also reports that he is having incontinence of urine and stool. Alleviating factors include nothing. Aggravating factors include nothing. Pain is 7/10. Nothing has been used for pain today. Nursing Notes were all reviewed and agreed with or any disagreements were addressed  in the HPI. REVIEW OF SYSTEMS  (2-9 systems for level 4, 10 or more for level 5)     Review of Systems   Constitutional: Negative for fever. HENT: Negative for congestion, rhinorrhea and sore throat. Mass to right neck   Respiratory: Negative for shortness of breath. Cardiovascular: Positive for chest pain. Gastrointestinal: Negative for abdominal pain.    Genitourinary: Positive for testicular pain. Negative for decreased urine volume and difficulty urinating. Musculoskeletal: Positive for back pain. Negative for arthralgias and myalgias. Skin: Negative for color change and rash. Scattered scabbed over wounds to entire body   Neurological: Negative for dizziness and light-headedness. Psychiatric/Behavioral: Negative for agitation. All other systems reviewed and are negative. Positivesand Pertinent negatives as per HPI. Except as noted above in the ROS, all other systems were reviewed and negative. PAST MEDICAL HISTORY     Past Medical History:   Diagnosis Date    Back pain     Cancer ([facility]     Pancreatic cancer    Depression 12/09/2019    GERD (gastroesophageal reflux disease)     Hepatitis C     Intentional drug overdose (HCC)     MRSA (methicillin resistant staph aureus) culture positive 09/25/2020    L5 disc asp    Polysubstance abuse ([facility]     Wears glasses          SURGICAL HISTORY       Past Surgical History:   Procedure Laterality Date    APPENDECTOMY      IR PERC ASPIRATION INTERVERT [address]  11/25/2020    IR PERC ASPIRATION INTERVERT DISC 11/25/2020 [facility]    SHOULDER SURGERY Left 04/01/2014    UPPER GASTROINTESTINAL ENDOSCOPY  01/01/2014         CURRENT MEDICATIONS       Previous Medications    BUPROPION (WELLBUTRIN SR) 100 MG EXTENDED RELEASE TABLET    Take 100 mg by mouth 2 times daily    BUSPIRONE (BUSPAR) 15 MG TABLET    Take 15 mg by mouth 3 times daily as needed Indications: Feeling Anxious    FOLIC ACID (FOLVITE) 1 MG TABLET    Take 1 mg by mouth daily    GABAPENTIN (NEURONTIN) 600 MG TABLET    Take 600 mg by mouth 3 times daily.      IBUPROFEN (ADVIL;MOTRIN) 800 MG TABLET    Take 800 mg by mouth every 8 hours as needed for Pain     LIDOCAINE-PRILOCAINE (EMLA) 2.5-2.5 % CREAM    Apply to the most painful wounds once daily at time of dressing change, as needed for pain -- leave on for 20 minutes and then wipe off before Current or Ex-Partner:     Emotionally Abused:     Physically Abused:     Sexually Abused:        SCREENINGS             PHYSICAL EXAM    (up to 7 for level 4, 8 ormore for level 5)     ED Triage Vitals [09/22/21 1906]   BP Temp Temp Source Pulse Resp SpO2 Height Weight   (!) 135/97 98.9 °F (37.2 °C) Oral 98 12 99 % 5' 9\" (1.753 m) 145 lb (65.8 kg)       Physical Exam  Constitutional:       Appearance: He is well-developed. HENT:      Head: Normocephalic and atraumatic. Neck:     Cardiovascular:      Rate and Rhythm: Normal rate. Pulmonary:      Effort: Pulmonary effort is normal. No respiratory distress. Abdominal:      General: There is no distension. Palpations: Abdomen is soft. Tenderness: There is no abdominal tenderness. Musculoskeletal:      Cervical back: Normal range of motion. Comments: Decreased range of motion due to pain elicited with movement to his upper and lower back. Diffuse tenderness to the entire spinal process. Sensation intact to lower extremities deep tendon reflexes intact. Patient reports that he is having incontinence of bowel and bladder. Skin:     General: Skin is warm and dry. Neurological:      Mental Status: He is alert and oriented to person, place, and time.          DIAGNOSTIC RESULTS   LABS:    Labs Reviewed   CBC WITH AUTO DIFFERENTIAL - Abnormal; Notable for the following components:       Result Value    Hemoglobin 12.5 (*)     Hematocrit 37.5 (*)     Neutrophils Absolute 7.8 (*)     All other components within normal limits    Narrative:     Performed at:  [facility],  [place], [facility]   Phone [phone]   COMPREHENSIVE METABOLIC PANEL W/ REFLEX TO MG FOR LOW K - Abnormal; Notable for the following components:    Alkaline Phosphatase 141 (*)     All other components within normal limits    Narrative:     Performed at:  [facility] ([facility] - [facility], [place], [facility]   Phone [phone]   SEDIMENTATION RATE - Abnormal; Notable for the following components:    Sed Rate 35 (*)     All other components within normal limits    Narrative:     Performed at:  [facility],  [place], [facility]   Phone [phone]   CULTURE, BLOOD 2   CULTURE, BLOOD 1   C.TRACHOMATIS N.GONORRHOEAE DNA, URINE   LACTATE, SEPSIS    Narrative:     Performed at:  [facility],  [place], [facility]   Phone [phone]   LACTATE, SEPSIS   URINE RT REFLEX TO CULTURE   URINE DRUG SCREEN   C-REACTIVE PROTEIN       All other labs were within normal range or not returned as of this dictation. EKG: All EKG's are interpreted by the Emergency Department Physician who either signs or Co-signs this chart in the absence of a cardiologist.  Please see their note for interpretation of EKG. RADIOLOGY:   Ultrasound scrotum and testicles interpreted by radiologist for  Impression:    Probable mild epididymal orchitis on the right with no mass identified. Recommend urological follow-up. Small hydrocele on the right. Normal left testicle        MRI and CT of the neck currently pending. Interpretation per the Radiologist below, if available at the time of this note:    [address]   Final Result   Probable mild epididymal orchitis on the right with no mass identified. Recommend urological follow-up. Small hydrocele on the right. Normal left testicle. CT SOFT TISSUE NECK W CONTRAST    (Results Pending)   MRI THORACIC SPINE W WO CONTRAST    (Results Pending)   US DUP ABD PEL RETRO SCROT COMPLETE    (Results Pending)     No results found.       PROCEDURES   Unless otherwise noted below, none     Procedures    CRITICAL CARE TIME   N/A    CONSULTS:  IP CONSULT TO HOSPITALIST      EMERGENCY DEPARTMENT COURSE and DIFFERENTIAL DIAGNOSIS/MDM:   Vitals: Vitals:    09/22/21 1906 09/22/21 2201   BP: (!) 135/97 132/85   Pulse: 98    Resp: 12    Temp: 98.9 °F (37.2 °C)    TempSrc: Oral    SpO2: 99%    Weight: 145 lb (65.8 kg)    Height: 5' 9\" (1.753 m)        Patient was given the following medications:  Medications   acetaminophen (TYLENOL) tablet 650 mg (650 mg Oral Given 9/22/21 2230)   gadoteridol (PROHANCE) injection 13 mL (13 mLs IntraVENous Given 9/22/21 2346)         Patient was seen and evaluated by Dr. Teresa Quach and myself. Patient here for multiple complaints. Patient reports he has a history of discitis from MRSA. Patient states that he was at one time in the hospital receiving IV antibiotics. He states he was discharged to be rehab facility for long-term antibiotics however left the facility and did not pursue further treatment. Patient presents today for complaints of urinary incontinence and increasing back pain. Patient denies any fever. On exam the patient is awake and alert hemodynamically stable nontoxic in appearance. He is neurologically intact to his arms and legs but does have urinary incontinence. Upon looking into his chart further patient had an MRI of his lumbar and thoracic regions today. Patient was able to obtain the lumbar MRI but left prior to receiving his thoracic MRI. Due to the fact the patient has a known history of discitis an MRI was ordered here for his lumbar region. Additional images have been ordered due to of his complaints of testicle pain and a mass noted to his right jaw. Those results are currently pending. Patient at this point will need admission for evaluation by infectious disease for long-term antibiotic treatment. Consult was placed to the hospitalist at [facility] and Dr. Madelyn Bruno has accepted the patient for transfer. Patient's labs are reassuring with the exception of elevated sed rate so IV antibiotics have been withheld until infectious disease can evaluate. Dr. Madelyn Bruno was okay with this plan. Patient will ultimately be transferred to [facility]. for further care. The patient tolerated their visit well. They were seen and evaluated by the attending physician, Jorge Martinez MD who agreed with the assessment and plan. The patient and / or the family were informed of the results of any tests, a time was given to answer questions, a plan was proposed and they agreed with plan. FINAL IMPRESSION      1. Urinary incontinence, unspecified type    2. Chronic low back pain, unspecified back pain laterality, unspecified whether sciatica present    3. Hx of discitis    4. IVDU (intravenous drug user)    5. Mass in neck    6. Elevated sed rate    7. Pain in right testicle          DISPOSITION/PLAN   DISPOSITION        PATIENT REFERRED TO:  No follow-up provider specified.     DISCHARGE MEDICATIONS:  New Prescriptions    No medications on file       DISCONTINUED MEDICATIONS:  Discontinued Medications    No medications on file              (Please note that portions of this note were completed with a voice recognition program.  Efforts were made to edit the dictations but occasionally words are mis-transcribed.)    JORDY Triana CNP (electronically signed)       JORDY Triana CNP  09/22/21 5038

## 2021-09-23 NOTE — H&P
ASPIRATION INTERVERT [address] 11/25/2020 [facility] SPECIAL PROCEDURES    SHOULDER SURGERY Left 04/01/2014    UPPER GASTROINTESTINAL ENDOSCOPY  01/01/2014       Medications Priorto Admission:    Medications Prior to Admission: traZODone (DESYREL) 100 MG tablet, Take 100 mg by mouth nightly  lidocaine-prilocaine (EMLA) 2.5-2.5 % cream, Apply to the most painful wounds once daily at time of dressing change, as needed for pain -- leave on for 20 minutes and then wipe off before applying antibiotic cream.  ibuprofen (ADVIL;MOTRIN) 800 MG tablet, Take 800 mg by mouth every 8 hours as needed for Pain   melatonin 3 MG TABS tablet, Take 1 tablet by mouth nightly  busPIRone (BUSPAR) 15 MG tablet, Take 15 mg by mouth 3 times daily as needed Indications: Feeling Anxious  folic acid (FOLVITE) 1 MG tablet, Take 1 mg by mouth daily  gabapentin (NEURONTIN) 600 MG tablet, Take 600 mg by mouth 3 times daily. buPROPion (WELLBUTRIN SR) 100 MG extended release tablet, Take 100 mg by mouth 2 times daily  pantoprazole (PROTONIX) 40 MG tablet, Take 40 mg by mouth daily  OLANZapine (ZYPREXA) 10 MG tablet, Take 1 tablet by mouth nightly    Allergies:  Patient has no known allergies. Social History:   · TOBACCO:   reports that he has been smoking cigarettes. He has been smoking about 1.00 pack per day. He has never used smokeless tobacco.  · ETOH:   reports no history of alcohol use. · DRUGS : Methamphetamine, Cannabis, Opioids   · Patient currently lives with family, wife   ·   Family History:       Problem Relation Age of Onset    Cancer Mother     Heart Disease Father        Review of Systems   Constitutional: Positive for chills, diaphoresis, fatigue and fever. HENT: Positive for sore throat. Respiratory: Positive for shortness of breath. Cardiovascular: Positive for chest pain. Negative for palpitations and leg swelling. Gastrointestinal: Positive for abdominal pain, blood in stool and diarrhea.    Genitourinary: Positive for difficulty urinating, discharge, dysuria, frequency, testicular pain and urgency. Musculoskeletal: Positive for back pain, myalgias and neck pain. Skin: Positive for wound. Neurological: Positive for numbness. All other systems reviewed and are negative. ROS: A 10 point review of systems was conducted, significant findings as noted in HPI. Physical Exam  Vitals reviewed. Constitutional:       General: He is not in acute distress. Appearance: He is not toxic-appearing. HENT:      Head: Normocephalic. Eyes:      Extraocular Movements: Extraocular movements intact. Conjunctiva/sclera: Conjunctivae normal.      Pupils: Pupils are equal, round, and reactive to light. Neck:      Comments: Firm right submental swelling, firm areas of induration over right jugular vein with associated superficial erythema of skin  Cardiovascular:      Rate and Rhythm: Normal rate and regular rhythm. Pulses: Normal pulses. Heart sounds: Normal heart sounds. Comments: Pain with palpation over anterior chest wall  Pulmonary:      Effort: Pulmonary effort is normal.      Breath sounds: Normal breath sounds. Abdominal:      General: Abdomen is flat. Bowel sounds are normal.      Palpations: Abdomen is soft. Musculoskeletal:         General: Normal range of motion. Cervical back: Normal range of motion. Comments: Pain with palpation over entirety of spine   Skin:     General: Skin is warm and dry. Capillary Refill: Capillary refill takes less than 2 seconds. Findings: Lesion present. Comments: Significant scattered scabbing and discoloration   Neurological:      General: No focal deficit present. Mental Status: He is alert and oriented to person, place, and time. Mental status is at baseline. Psychiatric:         Mood and Affect: Mood normal.         Behavior: Behavior normal.         Thought Content:  Thought content normal.         Judgment: Judgment normal. Physical exam:       Vitals:    09/23/21 0652   BP: 113/69   Pulse: 57   Resp: 16   Temp: 97.8 °F (36.6 °C)   SpO2: 99%       DATA:    Labs:  CBC:   Recent Labs     09/22/21 2108 09/23/21  0302   WBC 10.9 8.9   HGB 12.5* 13.2*   HCT 37.5* 39.6*    353       BMP:   Recent Labs     09/22/21 2108 09/23/21  0302    136   K 4.7 3.7    100   CO2 26 24   BUN 19 17   CREATININE 1.1 1.0   GLUCOSE 92 89     LFT's:   Recent Labs     09/22/21 2108   AST 32   ALT 39   BILITOT <0.2   ALKPHOS 141*     Troponin: No results for input(s): TROPONINI in the last 72 hours. BNP:No results for input(s): BNP in the last 72 hours. ABGs: No results for input(s): PHART, MYV8HSW, PO2ART in the last 72 hours. INR: No results for input(s): INR in the last 72 hours. U/A:No results for input(s): NITRITE, COLORU, PHUR, LABCAST, WBCUA, RBCUA, MUCUS, TRICHOMONAS, YEAST, BACTERIA, CLARITYU, SPECGRAV, LEUKOCYTESUR, UROBILINOGEN, BILIRUBINUR, BLOODU, GLUCOSEU, AMORPHOUS in the last 72 hours. Invalid input(s): Diana Kelley    No orders to display           ASSESSMENT AND PLAN:    Mr. Terry Sarmiento is a 41year old male presenting for back pain, urinary incontinence with painful testicles, and neck mass most consistent with known lumbosacral discitis, right-epididymitis, and cellulitic infections. #Acute on chronic Back Pain   #Chronic Lumbosacral (L5/S1) MRSA Discitis   Patient with a history of hospitalizations in 2020 for similar back pain. 1x BC positive for MRSA. Patient given trial of antibiotics on multiple occasions but always prematurely terminated treatment. Has not had antibiotics since late 2020. Reports back pain worsening over last several months with urinary incontinence developing last 2 months. Also with radiation down his legs with associated numbness and tingling.  Sensation/strength intact on exam.   Thoracic MRI without any abnormalities and lumbar MRI showing edema and enhancement of L5-S1 area -ECHO in setting of chest pain and SOB       #Depression   Continue home regimen  -Bupropion, Olanzapine, Trazodone       #GERD  Continue protonix 40mg PO daily       #Hepatitis C infection, chronic  Update liver panel   - Will need F/u for txt      #Tobacco Use   Nicotine patches PRN     #DVT ppx   Enoxaparin 40mg SQ daily        Will discuss with attending physician Dr. Garzon Quest Status:Full code  DISPO: IP . Will likely need extended IV antibx abt 6-8 weeks, in a supervised setting.        Manan Escobar (Scribjulio)  9/23/2021,  9:55 AM       Mello Rubio MD

## 2021-09-23 NOTE — CONSULTS
NEUROSURGERY CONSULT NOTE    Dayne Medina  [id number]   1980 9/23/2021    Requesting physician: Rose Schofield MD    Reason for consultation:discitis    History of present illness: Patient is a 41 y.o. male w/ PMH of IVDU, (pt stated last time he used IV heroin was July 2021) polysubstance abuse with Hep C infection, Lumbosacral discitis, recurrent skin and soft tissue infections, depression, and GERD. He is presenting via transfer from [facility] for back pain and urinary incontinence. He said he has had ongoing back pain for months but it has gotten much worse over the last few weeks. He started having pain when urinating and some pain in testicle on R with some episodic incontinence. He denies any numbness or tingling or bowel incontinence. Pt c/o of pain in low back and radiating down the back pf both legs to knee and spasms which stop him in his tracks. Thoracic MRI shows no discitis. MRI of lumbar shows persistent edema and enhancement in L5-s1 endplates as well as minimal ant epidural enhancement . Myosistis seen earlier has resolved. He only completed 3 weeks of the 12 weeks of antibiotics that were prescribed in April. ROS:   GENERAL:  Denies fever or recent illness. Denies weight changes   EYES:  Denies vision change or diplopia  EARS:  Denies hearing loss  CARDIAC:  Denies chest pain  RESPIRATORY:  Denies shortness of breath  SKIN:  Denies rash or lesions   HEM:  Denies excessive bruising  PSYCH:  Denies anxiety or depression  NEURO:  Denies headache, numbness or tingling or lateralizing weakness , severe low back pain radiating down back of both legs to knees  :  States he has had some episodes of urinary incontinence with r testicular pain.   GI: Denies nausea, vomiting, diarrhea or constipation  MUSCULOSKELETAL:  No arthralgias    No Known Allergies    Past Medical History:   Diagnosis Date    Back pain     Cancer ([facility]     Pancreatic cancer    Depression 12/09/2019    GERD (gastroesophageal reflux disease)     Hepatitis C     [facility]     MRSA (methicillin resistant staph aureus) culture positive 09/25/2020    L5 disc asp    Polysubstance abuse ([facility]     Wears glasses         Past Surgical History:   Procedure Laterality Date    APPENDECTOMY      IR PERC ASPIRATION INTERVERT DISC  11/25/2020    IR PERC ASPIRATION INTERVERT [address] 11/25/2020 [facility] SPECIAL PROCEDURES    SHOULDER SURGERY Left 04/01/2014    UPPER GASTROINTESTINAL ENDOSCOPY  01/01/2014       Social History     Occupational History    Not on file   Tobacco Use    Smoking status: Current Every Day Smoker     Packs/day: 1.00     Types: Cigarettes    Smokeless tobacco: Never Used   Vaping Use    Vaping Use: Never used   Substance and Sexual Activity    Alcohol use: Never    Drug use: Yes     Frequency: 7.0 times per week     Types: Marijuana    Sexual activity: Yes     Partners: Female        Family History   Problem Relation Age of Onset    Cancer Mother     Heart Disease Father         No outpatient medications have been marked as taking for the 9/23/21 encounter [facility] Encounter).         Current Facility-Administered Medications   Medication Dose Route Frequency Provider Last Rate Last Admin    traZODone (DESYREL) tablet 100 mg  100 mg Oral Nightly Wes Marcus MD        pantoprazole (PROTONIX) tablet 40 mg  40 mg Oral Daily Wes Marcus MD        OLANZapine [facility]) tablet 10 mg  10 mg Oral Nightly Wes Marcus MD        melatonin tablet 3 mg  3 mg Oral Nightly Wes Marcus MD        ibuprofen (ADVIL;MOTRIN) tablet 600 mg  600 mg Oral Q6H PRN Wes Marcus MD        gabapentin (NEURONTIN) tablet 600 mg  600 mg Oral TID Wes Marcus MD        folic acid (FOLVITE) tablet 1 mg  1 mg Oral Daily Wes Marcus MD        busPIRone (BUSPAR) tablet 15 mg  15 mg Oral TID PRN Wes Marcus MD        buPROPion [facility]) extended release tablet nystagmus noted  V: Facial sensation intact bilaterally to touch  VII: Face symmetric  VIII: Hearing intact bilaterally to spoken voice  IX: Palate movement equal bilaterally  XI: Shoulder shrug equal bilaterally  XII: Tongue midline    Musculoskeletal:   Gait: Not tested   Assist devices: None   Tone: normal  Motor strength:    Right  Left    Right  Left    Deltoid  5 5  Hip Flex  5 5   Biceps  5 5  Knee Extensors  5 5   Triceps  5 5  Knee Flexors  5 5   Wrist Ext  5 5  Ankle Dorsiflex. 5 5   Wrist Flex  5 5  Ankle Plantarflex. 5 5   Handgrip  5 5  Ext Aashish Longus  5 5   Thumb Ext  5 5         Radiological Findings:  Lumbar MRI 9/22  FINDINGS:   BONES/ALIGNMENT: There are 5 lumbar vertebral bodies.  Lumbar spinal   alignment is maintained.  Vertebral body heights are maintained.  There is   mild edema at L5-S1 with minimal enhancement.  There is a 6 mm enhancing   lesion in the right iliac bone.       SPINAL CORD:  Conus terminates at L1.  No nerve root thickening in the cauda   equina.       SOFT TISSUES: Persistent epidural enhancement anterior to the thecal sac at   L5-S1, similar to prior examination.  These is seated myositis has improved   in the interval.       L1-L2: There is no significant disc protrusion, spinal canal stenosis or   neural foraminal narrowing.       L2-L3: There is no significant disc protrusion, spinal canal stenosis or   neural foraminal narrowing.       L3-L4: Disc protrusion and facet DJD with mild spinal canal stenosis.  No   significant neural foraminal stenosis.       L4-L5: Disc extrusion, facet DJD, and ligamentum flavum hypertrophy with mild   spinal canal narrowing and narrowing of the left lateral recess.  There is   mild right and moderate left neural foraminal stenosis.       L5-S1: Intervertebral disc height loss with protrusion.  No significant   spinal canal stenosis.  Moderate bilateral neural foraminal stenosis.         Impression   1.  Persistent edema and enhancement in the L5-S1 endplates as well as minimal   anterior epidural enhancement in the area of known discitis.  The myositis   seen previously has resolved in the interval.   2. Small enhancing lesion in the right iliac bone is indeterminate, possibly   an atypical hemangioma. 3. Degenerative changes in the lower lumbar spine with mild spinal canal   stenosis at L3-4 and L4-5.  Lateral recess and neural foraminal stenosis as   above.       Thoracic MRI 9/22  FINDINGS:   BONES/ALIGNMENT: There is normal alignment of the spine. The vertebral body   heights are maintained. The bone marrow signal appears unremarkable.       SPINAL CORD: There is mild prominence of the central canal without discrete   syrinx.  No abnormal enhancement in the thoracic cord.       SOFT TISSUES:  No abnormal enhancement of the thoracic spine. No paraspinal   mass identified.       DEGENERATIVE CHANGES: No significant spinal canal stenosis or neural   foraminal narrowing of the thoracic spine.           Impression   1. No acute abnormality.  No evidence of osteomyelitis. 2. No significant degenerative change.             Labs:  Recent Labs     09/23/21  0302   WBC 8.9   HGB 13.2*   HCT 39.6*          Recent Labs     09/23/21  0302      K 3.7      CO2 24   BUN 17   CREATININE 1.0   GLUCOSE 89   CALCIUM 9.1       No results for input(s): PROTIME, INR, APTT in the last 72 hours.     Patient Active Problem List    Diagnosis Date Noted    Discitis 09/22/2021    Habitual self-excoriation 01/10/2021    Multiple open wounds (arms, hands, left thigh, face, etc) 01/10/2021    MRSA colonization 01/10/2021    Mild malnutrition ([address] 11/25/2020    Discitis of lumbosacral region 11/25/2020    Epidural abscess 11/24/2020    Osteomyelitis of vertebra, lumbosacral region [facility]) 09/23/2020    Tobacco abuse 09/23/2020    Depression 12/09/2019    IVDU (intravenous drug user) 12/05/2019    Polysubstance abuse ([address]     Hepatitis C virus infection without hepatic coma 03/25/2016    Ferguson's esophagus with dysplasia 03/22/2016    Gastroesophageal reflux disease without esophagitis 03/22/2016    Psychoactive [facility] mood disorder [facility] 12/21/2011       Assessment:  Patient is a 41 y.o. male w/persistent discitis who is neurologically intact with low back pain and radicular     Plan:  1. No neurosurgical intervention indicated  2. Neurologic exams frequency:- Floor: Q4H  3. Muscle spasms: scheduled Robaxin and prn Valium  4. Pain control: Managed by medical team  5. PT/OT consulted, appreciate recs  6. Urology consulted for R orchitis and testicular pain  7. ID consulted  8. Thank you for consult. Will sign off. Please call with any questions or decline in neurological status    DISPO: Remain inpatient from neurosurgery standpoint. Patient was seen and examined with Dr. Heidi William who agrees with above assessment and plan. Electronically signed by:  JORDY Dietz CNP, APRN-CNP, 9/23/2021 6:54 AM  [phone]

## 2021-09-23 NOTE — PROGRESS NOTES
Pt arrived to unit from [place] ED. Pt alert and oriented x4, VSS with pt on room air. Skin has multiple scattered bruising and open areas d/t IV drug use. Pt rates pain 4/10 in lower back. Pt eager to have a diet order. Bed alarm on and call light within reach.  at bedside.

## 2021-09-23 NOTE — CARE COORDINATION
Case Management Daily Note                    Date: 9/23/2021     Patient Name: Chao Merchant    Date of Admission: 9/23/2021  1:35 AM  YOB: 1980    Length of Stay: 0            Patient Admission Status: Inpatient  Diagnosis:Discitis [M46.40]     ________________________________________________________________________________________  Discharge Plan: Other: [facility]   Will need IV ABX plan that [facility] can handle, likely something q24h if possible. Insurance: Payor: Alric Medal / Plan: Venus Balling / Product Type: *No Product type* /   Is pre-cert/notification needed: n/a     Tentative discharge date: 9/25    Current barriers: ABX plan,     Referrals completed: Not Applicable    Resources/ information provided: Not indicated at this time   ________________________________________________________________________________________  PT AM-PAC:   / 24 per last evaluation on: N/A     OT AM-PAC:   / 24 per last evaluation on: N/A     DME Needs for discharge: defer  ________________________________________________________________________________________  Notes/Plan of Care:   Patient admitted after transfer from [address] [place] [address]. In custody of [facility]. Plan [facility] discharge to [facility]. May need IV ABX. Will need plan conducive to [facility] staff. SW following at this time. Sharan Smith and/or his family were provided with choice of provider; he and/or his family are in agreement with the discharge plan at this time.     Care Transition Patient: LUIS Fernández  [facility]   Case Management Department  Ph: [phone]

## 2021-09-23 NOTE — ED NOTES
Release of info paperwork signed and fax to  at [phone]     [facility]  09/22/21 4434

## 2021-09-23 NOTE — CONSULTS
NEUROSURGERY CONSULT NOTE    Gutierrez Montanez  [id number]   1980 9/23/2021    Requesting physician: Tayler Soares MD    Reason for consultation:discitis    History of present illness: Patient is a 41 y.o. male w/ PMH of IVDU, (pt stated last time he used IV heroin was July 2021) polysubstance abuse with Hep C infection, Lumbosacral discitis, recurrent skin and soft tissue infections, depression, and GERD. He is presenting via transfer from [facility] for back pain and urinary incontinence. He said he has had ongoing back pain for months but it has gotten much worse over the last few weeks. He started having pain when urinating and some pain in testicle on R with some episodic incontinence. He denies any numbness or tingling or bowel incontinence. Pt c/o of pain in low back and radiating down the back pf both legs to knee and spasms which stop him in his tracks. Thoracic MRI shows no discitis. MRI of lumbar shows persistent edema and enhancement in L5-s1 endplates as well as minimal ant epidural enhancement . Myosistis seen earlier has resolved. He only completed 3 weeks of the 12 weeks of antibiotics that were prescribed in April. ROS:   GENERAL:  Denies fever or recent illness. Denies weight changes   EYES:  Denies vision change or diplopia  EARS:  Denies hearing loss  CARDIAC:  Denies chest pain  RESPIRATORY:  Denies shortness of breath  SKIN:  Denies rash or lesions   HEM:  Denies excessive bruising  PSYCH:  Denies anxiety or depression  NEURO:  Denies headache, numbness or tingling or lateralizing weakness , severe low back pain radiating down back of both legs to knees  :  States he has had some episodes of urinary incontinence with r testicular pain.   GI: Denies nausea, vomiting, diarrhea or constipation  MUSCULOSKELETAL:  No arthralgias    No Known Allergies    Past Medical History:   Diagnosis Date    Back pain     Cancer [address] [address]     Pancreatic cancer    Depression 12/09/2019    GERD (gastroesophageal reflux disease)     Hepatitis C     Int[facility] drug overdose [facility] [address]     MRSA (methicillin resistant staph aureus) culture positive 09/25/2020    L5 disc asp    Polysubstance abuse ([address]     Wears glasses         Past Surgical History:   Procedure Laterality Date    APPENDECTOMY      IR PERC ASPIRATION INTERVERT DISC  11/25/2020    IR PERC ASPIRATION INTERVERT [address] 11/25/2020 [facility] SPECIAL PROCEDURES    SHOULDER SURGERY Left 04/01/2014    UPPER GASTROINTESTINAL ENDOSCOPY  01/01/2014       Social History     Occupational History    Not on file   Tobacco Use    Smoking status: Current Every Day Smoker     Packs/day: 1.00     Types: Cigarettes    Smokeless tobacco: Never Used   Vaping Use    Vaping Use: Never used   Substance and Sexual Activity    Alcohol use: Never    Drug use: Yes     Frequency: 7.0 times per week     Types: Marijuana    Sexual activity: Yes     Partners: Female        Family History   Problem Relation Age of Onset    Cancer Mother     Heart Disease Father         Outpatient Medications Marked as Taking for the 9/23/21 encounter [facility] Encounter)   Medication Sig Dispense Refill    traZODone (DESYREL) 100 MG tablet Take 100 mg by mouth nightly      lidocaine-prilocaine (EMLA) 2.5-2.5 % cream Apply to the most painful wounds once daily at time of dressing change, as needed for pain -- leave on for 20 minutes and then wipe off before applying antibiotic cream. 60 g 1    ibuprofen (ADVIL;MOTRIN) 800 MG tablet Take 800 mg by mouth every 8 hours as needed for Pain       melatonin 3 MG TABS tablet Take 1 tablet by mouth nightly 30 tablet 3    busPIRone (BUSPAR) 15 MG tablet Take 15 mg by mouth 3 times daily as needed Indications: Feeling Anxious      folic acid (FOLVITE) 1 MG tablet Take 1 mg by mouth daily      gabapentin (NEURONTIN) 600 MG tablet Take 600 mg by mouth 3 times daily.        buPROPion (WELLBUTRIN SR) 100 MG extended release tablet Take 100 mg by mouth 2 times daily      pantoprazole (PROTONIX) 40 MG tablet Take 40 mg by mouth daily      OLANZapine (ZYPREXA) 10 MG tablet Take 1 tablet by mouth nightly 30 tablet 0        Current Facility-Administered Medications   Medication Dose Route Frequency Provider Last Rate Last Admin    traZODone (DESYREL) tablet 100 mg  100 mg Oral Nightly Yazmin Ibrahim MD        pantoprazole (PROTONIX) tablet 40 mg  40 mg Oral Daily Yazmin Ibrahim MD   40 mg at 09/23/21 1230    OLANZapine (ZYPREXA) tablet 10 mg  10 mg Oral Nightly Yazmin Ibrahim MD        melatonin tablet 3 mg  3 mg Oral Nightly Yazmin Ibrahim MD        ibuprofen (ADVIL;MOTRIN) tablet 600 mg  600 mg Oral Q6H PRN Yazmin Ibrahim MD        gabapentin (NEURONTIN) tablet 600 mg  600 mg Oral TID Yazmin Ibrahim MD   600 mg at 21/09/19 5622    folic acid (FOLVITE) tablet 1 mg  1 mg Oral Daily Yazmin Ibrahim MD   1 mg at 09/23/21 1230    busPIRone (BUSPAR) tablet 15 mg  15 mg Oral TID PRN Yazmin Ibrahim MD   15 mg at 09/23/21 1230    buPROPion [facility]) extended release tablet 100 mg  100 mg Oral BID Yazmin Ibrahim MD   100 mg at 09/23/21 1230    sodium chloride flush 0.9 % injection 10 mL  10 mL IntraVENous 2 times per day Yazmin Ibrahim MD   10 mL at 09/23/21 1013    sodium chloride flush 0.9 % injection 10 mL  10 mL IntraVENous PRN Yazmin Ibrahim MD        0.9 % sodium chloride infusion  25 mL IntraVENous PRN Yazmin Ibrahim MD        ondansetron [facility]) injection 4 mg  4 mg IntraVENous Q4H PRN Yazmin Ibrahim MD        polyethylene glycol [facility]) packet 17 g  17 g Oral Daily PRN Yazmin Ibrahim MD        0.9 % sodium chloride infusion   IntraVENous Continuous Yazmin Ibrahim MD   Stopped at 09/23/21 1532    enoxaparin (LOVENOX) injection 40 mg  40 mg SubCUTAneous Daily Yazmin Ibrahim MD   40 mg at 09/23/21 1005    methocarbamol (ROBAXIN) 1,000 mg in dextrose 5 % 100 mL IVPB  1,000 mg IntraVENous Wilhelminia Claude Marquis Lyon, APRN - CNP   Stopped at 09/23/21 1221    Followed by   Calin Aguilera ON 9/24/2021] methocarbamol (ROBAXIN) tablet 1,500 mg  1,500 mg Oral 3 times per day Johnny Ashley, APRN - CNP        acetaminophen (TYLENOL) tablet 1,000 mg  1,000 mg Oral 4 times per day Johnny Ashley, APRN - CNP   1,000 mg at 09/23/21 1801    diazePAM (VALIUM) tablet 5 mg  5 mg Oral Q6H PRN Johnny Ashley, APRN - CNP   5 mg at 09/23/21 1806    lidocaine 4 % external patch 2 patch  2 patch TransDERmal Daily Johnny [facility], APRN - CNP   2 patch at 09/23/21 1231    levoFLOXacin (LEVAQUIN) tablet 500 mg  500 mg Oral Daily Marques Tanner MD   500 mg at 09/23/21 1642    nicotine (NICODERM CQ) 14 MG/24HR 1 patch  1 patch TransDERmal Daily Marques Tanner MD   1 patch at 09/23/21 1801    perflutren lipid microspheres (DEFINITY) injection 1.65 mg  1.5 mL IntraVENous ONCE PRN Marques Tanner MD        oxyCODONE (ROXICODONE) immediate release tablet 5 mg  5 mg Oral Q4H PRN Marques Tanner MD   5 mg at 09/23/21 1642        Objective:  /81   Pulse 78   Temp 98.2 °F (36.8 °C) (Oral)   Resp 16   Ht 5' 9\" (1.753 m)   Wt 145 lb 1 oz (65.8 kg)   SpO2 100%   BMI 21.42 kg/m²     Physical Exam:   Patient seen and examined  General: Well developed. Alert and cooperative appears to be in pain with visible spasms down legs. HENT: atraumatic, Mass to right neck   Eyes: Optic discs: Not tested  Pulmonary: unlabored respiratory effort  Cardiovascular:  Warm well perfused.  No peripheral edema  Gastrointestinal: abdomen soft, NT, ND    Neurological:  Mental Status: Awake, alert, oriented x 4, speech clear and appropriate  Attention: Intact  Language:No aphasia or dysarthria noted  Sensation: Intact to all extremities to light touch  Coordination: Intact  DTRs:    Right  Left    Albarado's - -   biceps  2 2   brachioradialis  2 2    Patella  2  2   ankle clonus  - -   toes (babinski)  d d     Cranial Nerves:  II: Visual acuity not tested, denies new visual changes / diplopia  III, IV, VI: PERRL, 3 mm bilaterally, EOMI, no nystagmus noted  V: Facial sensation intact bilaterally to touch  VII: Face symmetric  VIII: Hearing intact bilaterally to spoken voice  IX: Palate movement equal bilaterally  XI: Shoulder shrug equal bilaterally  XII: Tongue midline    Musculoskeletal:   Gait: Not tested   Assist devices: None   Tone: normal  Motor strength:    Right  Left    Right  Left    Deltoid  5 5  Hip Flex  5 5   Biceps  5 5  Knee Extensors  5 5   Triceps  5 5  Knee Flexors  5 5   Wrist Ext  5 5  Ankle Dorsiflex. 5 5   Wrist Flex  5 5  Ankle Plantarflex. 5 5   Handgrip  5 5  Ext Aashish Longus  5 5   Thumb Ext  5 5         Radiological Findings:    I personally reviewed the patient's imaging which consists of an MRI brain dated 9/22/2021. This demonstrates evidence of mild edema at L5-S1 with minimal enhancement which appears slightly improved in comparison to previous imaging. Mild degenerative changes are also seen throughout   the lumbar spine. Labs:  Recent Labs     09/23/21  0302   WBC 8.9   HGB 13.2*   HCT 39.6*          Recent Labs     09/23/21  0302      K 3.7      CO2 24   BUN 17   CREATININE 1.0   GLUCOSE 89   CALCIUM 9.1       No results for input(s): PROTIME, INR, APTT in the last 72 hours.     Patient Active Problem List    Diagnosis Date Noted    Discitis 09/22/2021    Habitual self-excoriation 01/10/2021    Multiple open wounds (arms, hands, left thigh, face, etc) 01/10/2021    MRSA colonization 01/10/2021    Mild malnutrition ([address] 11/25/2020    Discitis of lumbosacral region 11/25/2020    Epidural abscess 11/24/2020    Osteomyelitis of vertebra, lumbosacral region ([address] 09/23/2020    Tobacco abuse 09/23/2020    Depression 12/09/2019    IVDU (intravenous drug user) 12/05/2019    Polysubstance abuse ([address]     Hepatitis C virus infection without hepatic coma 03/25/2016    Ferguson's esophagus with dysplasia 03/22/2016    Gastroesophageal reflux disease without esophagitis 03/22/2016    Psychoactive substance-induced organic mood disorder ([facility] [address] 12/21/2011       Assessment:  Patient is a 41 y.o. male w/persistent discitis who is neurologically intact with low back pain and radicular     Plan:  1. No neurosurgical intervention indicated  2. Neurologic exams frequency:- Floor: Q4H  3. Muscle spasms: scheduled Robaxin and prn Valium  4. Pain control: Managed by medical team  5. PT/OT consulted, appreciate recs  6. Urology consulted for R orchitis and testicular pain  7. ID consulted  8. Thank you for consult. Will sign off.   Please call with any questions or decline in neurological status    Yolanda Mendes MD, PhD  [address], [address], [address] [phone] (c), [phone] (o)

## 2021-09-23 NOTE — CONSULTS
Urology Attending Consult Note      Reason for Consultation: R epididymitis     History: 42 yo M admitted to [facility] with back pain, R scrotal pain and urinary incontinence after being transferred from [facility] ED. He reports that his scrotal pain started around April and since has been progressing. Admits to associated urgency, frequency and dysuria with occasional incontinence. MRI lumbar spine shows discitis. Scrotal US shows R epididymitis, no testicular torsion or masses noted. Not currently on any Abx. Family History, Social History, Review of Systems:  Reviewed and agreed to as per chart    Vitals:  /69   Pulse 57   Temp 97.8 °F (36.6 °C) (Oral)   Resp 16   Ht 5' 9\" (1.753 m)   Wt 145 lb 1 oz (65.8 kg)   SpO2 99%   BMI 21.42 kg/m²   Temp  Av.4 °F (36.9 °C)  Min: 97.8 °F (36.6 °C)  Max: 98.9 °F (37.2 °C)  No intake or output data in the 24 hours ending 2133      Physical:   Well developed, well nourished in no acute distress   Mood indicates no abnormalities. Pt doesnt appear depressed   Orientated to time and place   Neck is supple, trachea is midline   Respiratory effort is normal   Cardiovascular show no extremity swelling   Abdomen no masses or hernias are palpated, there is no tenderness. Liver and Spleen appear normal.   Skin show no abnormal lesions   Lymph nodes are not palpated in the inguinal, neck, or axillary area. Male :   Penis appears normal and circumcised   Urethral meatus is normal in size and location   Scrotum appears normal and both testicles appear normal in size and location. R epididymitis tender and enlarged.        Labs:  WBC:    Lab Results   Component Value Date    WBC 8.9 2021     Hemoglobin/Hematocrit:    Lab Results   Component Value Date    HGB 13.2 2021    HCT 39.6 2021     BMP:    Lab Results   Component Value Date     2021    K 3.7 2021     2021    CO2 24 09/23/2021    BUN 17 09/23/2021    LABALBU 4.4 09/22/2021    CREATININE 1.0 09/23/2021    CALCIUM 9.1 09/23/2021    GFRAA >60 09/23/2021    GFRAA >60 12/20/2011    LABGLOM >60 09/23/2021     PT/INR:    Lab Results   Component Value Date    PROTIME 11.3 11/25/2020    INR 0.97 11/25/2020     PTT:    Lab Results   Component Value Date    APTT 29.9 09/23/2020   [APTT    Urinalysis: No urine taken     Antibiotic Therapy: None    Imaging:   Impression   Probable mild epididymal orchitis on the right with no mass identified. Recommend urological follow-up.       Small hydrocele on the right.       Normal left testicle. Impression/Plan: 42 yo M with discitis and R epididymitis.  -Patient reporting continued LUTS and R scrotal pain today. -Exam confirms US finding of R epididymitis  -Bladder scan to ensure emptying  -UA/culture ordered  -Would recommend treating conservatively with ice, elevation and antibiotics at this time.  No surgical intervention warranted.  -Will follow    AMINA Lam

## 2021-09-23 NOTE — CONSULTS
Infectious Diseases Inpatient Consult Note    Reason for Consult:   Spine infection, epididymo-orchitis   Requesting Physician:   Dr Kimberly Wilson  Primary Care Physician:  JORDY Estevez - CNP  History Obtained From:   Pt, EPIC    Admit Date: 9/23/2021  Hospital Day: 1    CHIEF COMPLAINT:     Back pain, testicular pain    HISTORY OF PRESENT ILLNESS:      42 yo man with hx CAD, HCV, CANDE - iv heroine  Hx skin / soft tissue infection, cult + MRSA    Hx lumbosacral diskitis, vertebral osteomyelitis  Admit [facility] 9/23/20, MRI L5/S1 diskitis, left AMA  Admit [facility] 10/8/20, BC x 1 MRSA, disk biopsy cult neg, rx Linezolid, took this x 2 weeks, then rx dalbavancin  Admit [facility] 11/24/20, repeat MRI, biopsy (cult neg), dx with plan for 8 weeks iv vanco, left ECF after few days (less than 1 week)    Ongoing LBP, assoc with noc sweat, fatigue  9/2/21 ESR 12, CRP 21, BC neg  9/9/21 ID virtual visit     9/22 Seen at [facility] ED  For eval LBP, incontinence, testicular pain  Testicular pain for months   In ED, afeb, WBC 10.9. MRI 'persistent endema and enhancement in the L5-S1 endplates, 'minimal anterior epidural enhancement'  Ultrasound - mild R epididymitis / orchitis  Transferred to [facility]. Seen by Neurosurg, no intervention recommended   Seen by , no surgery recommended  Started on Levofloxacin.       Past Medical History:    Past Medical History:   Diagnosis Date    Back pain     Cancer ([address]     Pancreatic cancer    Depression 12/09/2019    GERD (gastroesophageal reflux disease)     Hepatitis C     Int[facility] ([facility] [address]     MRSA (methicillin resistant staph aureus) culture positive 09/25/2020    L5 disc asp    Polysubstance abuse ([address]     Wears glasses        Past Surgical History:    Past Surgical History:   Procedure Laterality Date    APPENDECTOMY      IR PERC ASPIRATION INTERVERT DISC  11/25/2020    IR PERC ASPIRATION INTERVERT [address] 11/25/2020 [facility] SPECIAL PROCEDURES    SHOULDER SURGERY Left 04/01/2014    UPPER GASTROINTESTINAL ENDOSCOPY  01/01/2014       Current Medications:     traZODone  100 mg Oral Nightly    pantoprazole  40 mg Oral Daily    OLANZapine  10 mg Oral Nightly    melatonin  3 mg Oral Nightly    gabapentin  600 mg Oral TID    folic acid  1 mg Oral Daily    buPROPion  100 mg Oral BID    sodium chloride flush  10 mL IntraVENous 2 times per day    enoxaparin  40 mg SubCUTAneous Daily    methocarbamol IVPB  1,000 mg IntraVENous Q8H    Followed by   Shirlene Jasper ON 9/24/2021] methocarbamol  1,500 mg Oral 3 times per day    acetaminophen  1,000 mg Oral 4 times per day    lidocaine  2 patch TransDERmal Daily    levoFLOXacin  500 mg Oral Daily    nicotine  1 patch TransDERmal Daily       Allergies:  Patient has no known allergies. Social History:    TOBACCO:    + cig use  ETOH:      DRUGS:   Polysubstance use   MARITAL STATUS:      OCCUPATION:   None       Family History:   No immunodeficiency    REVIEW OF SYSTEMS:    No fever / chills / sweats. No weight loss. No visual change, eye pain, eye discharge. No oral lesion, sore throat, dysphagia. Denies cough / sputum. Denies chest pain, palpitations. Denies n / v / abd pain. No diarrhea. Denies dysuria or change in urinary function. + scrotal pain  Denies joint swelling or pain. No myalgia, arthralgia.  + LBP  Denies skin changes, itching  Denies focal weakness, sensory change or other neurologic symptom    Denies new / worse depression, psychiatric symptoms    PHYSICAL EXAM:      Vitals:    /82   Pulse 61   Temp 97.7 °F (36.5 °C) (Oral)   Resp 16   Ht 5' 9\" (1.753 m)   Wt 145 lb 1 oz (65.8 kg)   SpO2 100%   BMI 21.42 kg/m²     GENERAL: No apparent distress.   RA  HEENT: Membranes moist, no oral lesion, PERRL  NECK:  Supple, no lymphadenopathy  LUNGS: Clear b/l, no rales, no dullness  CARDIAC: RRR, no murmur appreciated  ABD:  + BS, soft / NT  EXT:  No rash, no edema, no lesions  NEURO: No focal neurologic findings  PSYCH: Orientation, sensorium, mood normal  LINES:  Peripheral iv    DATA:    Lab Results   Component Value Date    WBC 8.9 2021    HGB 13.2 (L) 2021    HCT 39.6 (L) 2021    MCV 87.5 2021     2021     Lab Results   Component Value Date    CREATININE 1.0 2021    BUN 17 2021     2021    K 3.7 2021     2021    CO2 24 2021       Hepatic Function Panel:   Lab Results   Component Value Date    ALKPHOS 141 2021    ALT 39 2021    AST 32 2021    PROT 7.8 2021    PROT 8.8 2011    BILITOT <0.2 2021    BILIDIR <0.2 2020    IBILI see below 2020    LABALBU 4.4 2021 CRP 15.7, repeat 13.3; ESR 25   Procalcitonin 0.24   Tox - + amphetamine, cannabinoid    Micro:   BC, urine cult sent     Imagin/22 Thoracic MRI w/wo contrast  Impression   1. No acute abnormality.  No evidence of osteomyelitis. 2. No significant degenerative change.  Lumbar MRI w/wo contrast  Impression   1. Persistent edema and enhancement in the L5-S1 endplates as well as minimal   anterior epidural enhancement in the area of known discitis.  The myositis   seen previously has resolved in the interval.   2. Small enhancing lesion in the right iliac bone is indeterminate, possibly   an atypical hemangioma. 3. Degenerative changes in the lower lumbar spine with mild spinal canal   stenosis at L3-4 and L4-5.  Lateral recess and neural foraminal stenosis as   above.      Scrotal Ultrasound  Impression   Probable mild epididymal orchitis on the right with no mass identified. Recommend urological follow-up.       Small hydrocele on the right.       Normal left testicle       20 Lumbar MRI  Impression   Abnormal appearance of the L5 and S1 levels.  There is intervertebral disc   space narrowing with erosive endplate changes and small intervertebral fluid collection. Diffuse signal abnormality is noted throughout the L5 and S1   vertebral bodies indicating marrow edema. On the postcontrast images, there   is enhancement of the intervertebral space as well as enhancement of the   epidural margin posterior to the L5-S1 levels and patchy enhancement of the   L5 and S1 vertebral bodies. The findings are compatible with   discitis/osteomyelitis with a suspect small epidural abscess. IMPRESSION:      Patient Active Problem List   Diagnosis    Psychoactive substance-induced organic mood disorder ([address]    Ferguson's esophagus with dysplasia    Gastroesophageal reflux disease without esophagitis    Hepatitis C virus infection without hepatic coma    IVDU (intravenous drug user)    Polysubstance abuse ([address]    Depression    Osteomyelitis of vertebra, lumbosacral region ([address]    Tobacco abuse    Epidural abscess    Mild malnutrition ([address]    Discitis of lumbosacral region    Habitual self-excoriation    Multiple open wounds (arms, hands, left thigh, face, etc)    MRSA colonization    Discitis       Hx CAD, HCV, CANDE - iv heroine  Hx skin / soft tissue infection, cult + MRSA    Hx lumbosacral diskitis, vertebral osteomyelitis   - Dx 9/2020, BC + MRSA 10/8/20, L5/S1 biopsy no growth     - never had adequate treatment    - mild elevation ESR / CRP, low procalcitonin   - unclear if pt has active infection or has MRI finding of sequelae from previous infection    R epididymo-orchitis    RECOMMENDATIONS:    Cont levofloxacin for now  Will review MRI with radiologist    Will need to proceed with empiric course antibiotic therapy for osteo-diskitis  Will place PICC and order iv antibiotics (6-8 weeks with vancomycin + cefepime)    Medical Decision Making:   The following items were considered in medical decision making:  Discussion of patient care with other providers  Reviewed clinical lab tests  Reviewed radiology tests  Reviewed other diagnostic

## 2021-09-23 NOTE — ED NOTES
Patient going to bed [phone] at [facility]. Call report to [phone]. [employer] ETA [phone].      Fallon Marie  09/22/21 [id number]

## 2021-09-23 NOTE — PROGRESS NOTES
Patient A&Ox4, VSS. Patient reporting RUE and RLE numbness and tingling, back and scrotal pain. Pain managed per MAR. Patient ambulates in room and to the bathroom with standby assist, tolerates well. Patient tolerating diet and avoiding adequately. Patient instructed to call out for needs. Will continue to monitor.

## 2021-09-23 NOTE — ED NOTES
Naveen Madrigal Spoke with Dr. Jaden Miller at [facility] and the patient is accepted.      Fallon Marie  09/22/21 [id number]

## 2021-09-24 VITALS
SYSTOLIC BLOOD PRESSURE: 107 MMHG | BODY MASS INDEX: 21.49 KG/M2 | WEIGHT: 145.06 LBS | OXYGEN SATURATION: 98 % | HEART RATE: 87 BPM | DIASTOLIC BLOOD PRESSURE: 73 MMHG | HEIGHT: 69 IN | TEMPERATURE: 97.7 F | RESPIRATION RATE: 16 BRPM

## 2021-09-24 LAB
ANION GAP SERPL CALCULATED.3IONS-SCNC: 14 MMOL/L (ref 3–16)
BASOPHILS ABSOLUTE: 0.1 K/UL (ref 0–0.2)
BASOPHILS RELATIVE PERCENT: 0.6 %
BUN BLDV-MCNC: 16 MG/DL (ref 7–20)
CALCIUM SERPL-MCNC: 9.2 MG/DL (ref 8.3–10.6)
CHLORIDE BLD-SCNC: 104 MMOL/L (ref 99–110)
CO2: 19 MMOL/L (ref 21–32)
CREAT SERPL-MCNC: 1 MG/DL (ref 0.9–1.3)
EOSINOPHILS ABSOLUTE: 0.3 K/UL (ref 0–0.6)
EOSINOPHILS RELATIVE PERCENT: 2.9 %
GFR AFRICAN AMERICAN: >60
GFR NON-AFRICAN AMERICAN: >60
GLUCOSE BLD-MCNC: 87 MG/DL (ref 70–99)
HCT VFR BLD CALC: 40.7 % (ref 40.5–52.5)
HEMOGLOBIN: 13.4 G/DL (ref 13.5–17.5)
LV EF: 53 %
LVEF MODALITY: NORMAL
LYMPHOCYTES ABSOLUTE: 3.2 K/UL (ref 1–5.1)
LYMPHOCYTES RELATIVE PERCENT: 35.7 %
MCH RBC QN AUTO: 29.3 PG (ref 26–34)
MCHC RBC AUTO-ENTMCNC: 32.9 G/DL (ref 31–36)
MCV RBC AUTO: 89.1 FL (ref 80–100)
MONOCYTES ABSOLUTE: 0.7 K/UL (ref 0–1.3)
MONOCYTES RELATIVE PERCENT: 7.6 %
NEUTROPHILS ABSOLUTE: 4.8 K/UL (ref 1.7–7.7)
NEUTROPHILS RELATIVE PERCENT: 53.2 %
PDW BLD-RTO: 14.1 % (ref 12.4–15.4)
PLATELET # BLD: 348 K/UL (ref 135–450)
PMV BLD AUTO: 7.7 FL (ref 5–10.5)
POTASSIUM REFLEX MAGNESIUM: 4.8 MMOL/L (ref 3.5–5.1)
RBC # BLD: 4.57 M/UL (ref 4.2–5.9)
SODIUM BLD-SCNC: 137 MMOL/L (ref 136–145)
URINE CULTURE, ROUTINE: NORMAL
WBC # BLD: 9 K/UL (ref 4–11)

## 2021-09-24 PROCEDURE — 6360000002 HC RX W HCPCS: Performed by: INTERNAL MEDICINE

## 2021-09-24 PROCEDURE — 85025 COMPLETE CBC W/AUTO DIFF WBC: CPT

## 2021-09-24 PROCEDURE — 99233 SBSQ HOSP IP/OBS HIGH 50: CPT | Performed by: INTERNAL MEDICINE

## 2021-09-24 PROCEDURE — 80048 BASIC METABOLIC PNL TOTAL CA: CPT

## 2021-09-24 PROCEDURE — 6360000002 HC RX W HCPCS: Performed by: NURSE PRACTITIONER

## 2021-09-24 PROCEDURE — 6370000000 HC RX 637 (ALT 250 FOR IP): Performed by: PHYSICIAN ASSISTANT

## 2021-09-24 PROCEDURE — 93306 TTE W/DOPPLER COMPLETE: CPT

## 2021-09-24 PROCEDURE — 6370000000 HC RX 637 (ALT 250 FOR IP): Performed by: INTERNAL MEDICINE

## 2021-09-24 PROCEDURE — 6370000000 HC RX 637 (ALT 250 FOR IP): Performed by: NURSE PRACTITIONER

## 2021-09-24 PROCEDURE — 36415 COLL VENOUS BLD VENIPUNCTURE: CPT

## 2021-09-24 PROCEDURE — 2580000003 HC RX 258: Performed by: NURSE PRACTITIONER

## 2021-09-24 RX ORDER — SODIUM CHLORIDE 9 MG/ML
25 INJECTION, SOLUTION INTRAVENOUS PRN
Status: DISCONTINUED | OUTPATIENT
Start: 2021-09-24 | End: 2021-09-24 | Stop reason: HOSPADM

## 2021-09-24 RX ORDER — PHENAZOPYRIDINE HYDROCHLORIDE 200 MG/1
200 TABLET, FILM COATED ORAL
Status: DISCONTINUED | OUTPATIENT
Start: 2021-09-24 | End: 2021-09-24 | Stop reason: HOSPADM

## 2021-09-24 RX ORDER — SODIUM CHLORIDE 0.9 % (FLUSH) 0.9 %
5-40 SYRINGE (ML) INJECTION EVERY 12 HOURS SCHEDULED
Status: DISCONTINUED | OUTPATIENT
Start: 2021-09-24 | End: 2021-09-24 | Stop reason: HOSPADM

## 2021-09-24 RX ORDER — LIDOCAINE HYDROCHLORIDE 10 MG/ML
5 INJECTION, SOLUTION EPIDURAL; INFILTRATION; INTRACAUDAL; PERINEURAL ONCE
Status: DISCONTINUED | OUTPATIENT
Start: 2021-09-24 | End: 2021-09-24 | Stop reason: HOSPADM

## 2021-09-24 RX ORDER — SODIUM CHLORIDE 0.9 % (FLUSH) 0.9 %
5-40 SYRINGE (ML) INJECTION PRN
Status: DISCONTINUED | OUTPATIENT
Start: 2021-09-24 | End: 2021-09-24 | Stop reason: HOSPADM

## 2021-09-24 RX ADMIN — FOLIC ACID 1 MG: 1 TABLET ORAL at 08:35

## 2021-09-24 RX ADMIN — ENOXAPARIN SODIUM 40 MG: 40 INJECTION SUBCUTANEOUS at 08:33

## 2021-09-24 RX ADMIN — PHENAZOPYRIDINE HYDROCHLORIDE 200 MG: 200 TABLET ORAL at 11:39

## 2021-09-24 RX ADMIN — ACETAMINOPHEN 1000 MG: 500 TABLET, FILM COATED ORAL at 11:39

## 2021-09-24 RX ADMIN — BUPROPION HYDROCHLORIDE 100 MG: 100 TABLET, FILM COATED, EXTENDED RELEASE ORAL at 08:35

## 2021-09-24 RX ADMIN — GABAPENTIN 600 MG: 600 TABLET, FILM COATED ORAL at 08:35

## 2021-09-24 RX ADMIN — OXYCODONE 5 MG: 5 TABLET ORAL at 08:34

## 2021-09-24 RX ADMIN — LEVOFLOXACIN 500 MG: 500 TABLET, FILM COATED ORAL at 08:35

## 2021-09-24 RX ADMIN — METHOCARBAMOL 1000 MG: 100 INJECTION, SOLUTION INTRAMUSCULAR; INTRAVENOUS at 02:27

## 2021-09-24 RX ADMIN — PANTOPRAZOLE SODIUM 40 MG: 40 TABLET, DELAYED RELEASE ORAL at 08:34

## 2021-09-24 RX ADMIN — METHOCARBAMOL 1500 MG: 750 TABLET ORAL at 11:39

## 2021-09-24 ASSESSMENT — ENCOUNTER SYMPTOMS
CONSTIPATION: 0
SHORTNESS OF BREATH: 1
SORE THROAT: 0
VOMITING: 0
COUGH: 0
DIARRHEA: 1
ABDOMINAL PAIN: 0
BACK PAIN: 1
TROUBLE SWALLOWING: 0
NAUSEA: 0

## 2021-09-24 ASSESSMENT — PAIN DESCRIPTION - LOCATION: LOCATION: BACK

## 2021-09-24 ASSESSMENT — PAIN - FUNCTIONAL ASSESSMENT: PAIN_FUNCTIONAL_ASSESSMENT: PREVENTS OR INTERFERES SOME ACTIVE ACTIVITIES AND ADLS

## 2021-09-24 ASSESSMENT — PAIN DESCRIPTION - PROGRESSION: CLINICAL_PROGRESSION: NOT CHANGED

## 2021-09-24 ASSESSMENT — PAIN DESCRIPTION - PAIN TYPE: TYPE: ACUTE PAIN

## 2021-09-24 ASSESSMENT — PAIN DESCRIPTION - FREQUENCY: FREQUENCY: CONTINUOUS

## 2021-09-24 ASSESSMENT — PAIN DESCRIPTION - ORIENTATION: ORIENTATION: MID;POSTERIOR

## 2021-09-24 ASSESSMENT — PAIN SCALES - GENERAL
PAINLEVEL_OUTOF10: 7
PAINLEVEL_OUTOF10: 5
PAINLEVEL_OUTOF10: 0

## 2021-09-24 ASSESSMENT — PAIN DESCRIPTION - ONSET: ONSET: ON-GOING

## 2021-09-24 ASSESSMENT — PAIN DESCRIPTION - DESCRIPTORS: DESCRIPTORS: TIGHTNESS

## 2021-09-24 NOTE — DISCHARGE INSTR - COC
Continuity of Care Form    Patient Name: Jose Francisco Jacome   :  1980  MRN:  [id number]    Admit date:  2021  Discharge date:  ***    Code Status Order: Full Code   Advance Directives:      Admitting Physician:  Parker Tomlin MD  PCP: JORDY Don - CNP    Discharging Nurse: [facility] Unit/Room#: 9615/4007-80  Discharging Unit Phone Number: ***    Emergency Contact:   Extended Emergency Contact Information  Primary Emergency Contact: [facility] Phone: [phone]  Relation: Spouse   needed? No  Secondary Emergency Contact: [address] [place] [address] Phone: [phone]  Relation: Parent   needed? No    Past Surgical History:  Past Surgical History:   Procedure Laterality Date    APPENDECTOMY      IR PERC ASPIRATION INTERVERT DISC  2020    IR PERC ASPIRATION INTERVERT [address] 2020 [facility] SPECIAL PROCEDURES    SHOULDER SURGERY Left 2014    UPPER GASTROINTESTINAL ENDOSCOPY  2014       Immunization History: There is no immunization history for the selected administration types on file for this patient. 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 XXXA    MRSA colonization Z22.322    Discitis M46.40    Chronic bilateral low back pain without sciatica M54.5, G89.29    Substance use disorder F19.90 Impairments/Disabilities:918431899:::0}    Nutrition Therapy:  Current Nutrition Therapy:   [address] ILIA Diet List:690527051:::0}    Routes of Feeding: {[facility] Other Feedings:065096419:::0}  Liquids: {Slp liquid thickness:27715}  Daily Fluid Restriction: {[facility] DME Yes amt example:198496313:::0}  Last Modified Barium Swallow with Video (Video Swallowing Test): {Done Not Done BZD:::3}    Treatments at the Time of Hospital Discharge:   Respiratory Treatments: ***  Oxygen Therapy:  {Therapy; copd oxygen:44292:::0}  Ventilator:    {[facility] Vent List:998098109:::0}    Rehab Therapies: {THERAPEUTIC INTERVENTION:6353188919}  Weight Bearing Status/Restrictions: {[facility] Weight Bearin:::0}  Other Medical Equipment (for information only, NOT a DME order):  {EQUIPMENT:236985361}  Other Treatments: ***    Patient's personal belongings (please select all that are sent with patient):  {[facility] DME Belongings:028245900:::0}    RN SIGNATURE:  {Esignature:028878492:::0}    CASE MANAGEMENT/SOCIAL WORK SECTION    Inpatient Status Date: ***    Readmission Risk Assessment Score:  Readmission Risk              Risk of Unplanned Readmission:  16           Discharging to Facility/ Agency   Name:   Address:  Phone:  Fax:    Dialysis Facility (if applicable)   Name:  Address:  Dialysis Schedule:  Phone:  Fax:    / signature: {Esignature:917516745:::0}      PHYSICIAN SECTION    Prognosis: Fair    Condition at Discharge: Stable    Rehab Potential (if transferring to Rehab):  Fair    Recommended Labs or Other Treatments After Discharge:   INFUSION ORDERS:  Vancomycin 1.5 gm iv q 12 through  (8 weeks)  Ceftriaxone 2 gm iv daily through  (8 weeks)   - Diagnosis - lumbosacral osteo-diskitis   - First dose given in hospital  - PICC   - Disposition / date discharge  - Check CBC w diff, CMP, ESR, CRP, vancomycin trough every Mon or Tue - FAX result to [phone]  - Call with antibiotic / infusion issues, [phone]  - Call with any change in status, transfer in or out of a facility or to hospital - [phone]  - No f/u in outpatient ID office necessary    Physician Certification: I certify the above information and transfer of Phyllis Ellison  is necessary for the continuing treatment of the diagnosis listed and that he requires Damien Khanh for greater 30 days.      Update Admission H&P: No change in H&P    PHYSICIAN SIGNATURE:  Electronically signed by Meera Mittal MD on 9/24/21 at 12:03 PM EDT

## 2021-09-24 NOTE — PROGRESS NOTES
Patient a/o x4. Vitals stable. Pain treated with oral medication. No significant changes. Resting well overnight.

## 2021-09-24 NOTE — CARE COORDINATION
SW was informed that the [facility] has left, he is no longer in custody. He will now need community placement of some type vs. Return home.           LUIS Fairbanks, [facility]  Social Work/Case Management  [facility] [employer]   [phone]

## 2021-09-24 NOTE — PROGRESS NOTES
ID Follow-up NOTE    CC:   Spine infection, R epididymo-orchitis  Antibiotics: Levofloxacin    Admit Date: 2021  Hospital Day: 2    Subjective:     Patient c/o LBP, no change, assoc with LE numbness      Objective:     Patient Vitals for the past 8 hrs:   BP Temp Temp src Pulse Resp SpO2   21 0715 135/76 97.7 °F (36.5 °C) Oral 86 16 98 %   21 0223 124/77 96.5 °F (35.8 °C) Oral 65 16 96 %     I/O last 3 completed shifts: In: 720 [P.O.:720]  Out: 900 [Urine:900]  I/O this shift:  In: -   Out: 900 [Urine:900]    EXAM:  GENERAL: No apparent distress. RA  HEENT: Membranes moist, no oral lesion  NECK:  Supple, no lymphadenopathy  LUNGS: Clear b/l, no rales, no dullness  CARDIAC: RRR, no murmur appreciated  ABD:  + BS, soft / NT  EXT:  No rash, no edema, no lesions  NEURO: No focal neurologic findings  PSYCH: Orientation, sensorium, mood normal  LINES:  Peripheral iv       Data Review:  Lab Results   Component Value Date    WBC 9.0 2021    HGB 13.4 (L) 2021    HCT 40.7 2021    MCV 89.1 2021     2021     Lab Results   Component Value Date    CREATININE 1.0 2021    BUN 16 2021     2021    K 4.8 2021     2021    CO2 19 (L) 2021       Hepatic Function Panel:   Lab Results   Component Value Date    ALKPHOS 141 2021    ALT 39 2021    AST 32 2021    PROT 7.8 2021    PROT 8.8 2011    BILITOT <0.2 2021    BILIDIR <0.2 2020    IBILI see below 2020    LABALBU 4.4 2021 CRP 15.7, repeat 13.3; ESR 25   Procalcitonin 0.24   Tox - + amphetamine, cannabinoid     Micro:   BC, urine cult sent      Imagin/22 Thoracic MRI w/wo contrast  Impression   1. No acute abnormality.  No evidence of osteomyelitis. 2. No significant degenerative change.       Lumbar MRI w/wo contrast  Impression   1.  Persistent edema and enhancement in the L5-S1 endplates as well as minimal   anterior epidural enhancement in the area of known discitis.  The myositis   seen previously has resolved in the interval.   2. Small enhancing lesion in the right iliac bone is indeterminate, possibly   an atypical hemangioma. 3. Degenerative changes in the lower lumbar spine with mild spinal canal   stenosis at L3-4 and L4-5.  Lateral recess and neural foraminal stenosis as   above.      9/22 Scrotal Ultrasound  Impression   Probable mild epididymal orchitis on the right with no mass identified. Recommend urological follow-up.       Small hydrocele on the right.       Normal left testicle        11/20/20 Lumbar MRI  Impression   Abnormal appearance of the L5 and S1 levels. There is intervertebral disc   space narrowing with erosive endplate changes and small intervertebral fluid   collection. Diffuse signal abnormality is noted throughout the L5 and S1   vertebral bodies indicating marrow edema. On the postcontrast images, there   is enhancement of the intervertebral space as well as enhancement of the   epidural margin posterior to the L5-S1 levels and patchy enhancement of the   L5 and S1 vertebral bodies.  The findings are compatible with   discitis/osteomyelitis with a suspect small epidural abscess       Scheduled Meds:   phenazopyridine  200 mg Oral TID WC    lidocaine 1 % injection  5 mL IntraDERmal Once    sodium chloride flush  5-40 mL IntraVENous 2 times per day    traZODone  100 mg Oral Nightly    pantoprazole  40 mg Oral Daily    OLANZapine  10 mg Oral Nightly    melatonin  3 mg Oral Nightly    gabapentin  600 mg Oral TID    folic acid  1 mg Oral Daily    buPROPion  100 mg Oral BID    sodium chloride flush  10 mL IntraVENous 2 times per day    enoxaparin  40 mg SubCUTAneous Daily    methocarbamol  1,500 mg Oral 3 times per day    acetaminophen  1,000 mg Oral 4 times per day    lidocaine  2 patch TransDERmal Daily    levoFLOXacin  500 mg Oral Daily    nicotine  1 patch

## 2021-09-24 NOTE — PLAN OF CARE
Problem: Pain:  Goal: Pain level will decrease  Description: Pain level will decrease  Outcome: Ongoing  Pt's pain is being controlled with oral pain medication. Problem: Falls - Risk of:  Goal: Will remain free from falls  Description: Will remain free from falls  9/24/2021 1344 by Dinah Flores RN  Outcome: Ongoing  Fall precautions are in place. Pt is calling out appropriately. Call light and belongings are within reach.

## 2021-09-24 NOTE — PROGRESS NOTES
Urology Attending Progress Note      Subjective: Complains of back pain and dysuria    Vitals:  /76   Pulse 86   Temp 97.7 °F (36.5 °C) (Oral)   Resp 16   Ht 5' 9\" (1.753 m)   Wt 145 lb 1 oz (65.8 kg)   SpO2 98%   BMI 21.42 kg/m²   Temp  Av.7 °F (36.5 °C)  Min: 96.5 °F (35.8 °C)  Max: 98.2 °F (36.8 °C)    Intake/Output Summary (Last 24 hours) at 2021 0850  Last data filed at 2021 0719  Gross per 24 hour   Intake 720 ml   Output 1800 ml   Net -1080 ml       Exam: Still with R epididymal tenderness to palpation    Labs:  WBC:    Lab Results   Component Value Date    WBC 8.9 2021     Hemoglobin/Hematocrit:    Lab Results   Component Value Date    HGB 13.2 2021    HCT 39.6 2021     BMP:    Lab Results   Component Value Date     2021    K 3.7 2021     2021    CO2 24 2021    BUN 17 2021    LABALBU 4.4 2021    CREATININE 1.0 2021    CALCIUM 9.1 2021    GFRAA >60 2021    GFRAA >60 2011    LABGLOM >60 2021     PT/INR:    Lab Results   Component Value Date    PROTIME 11.3 2020    INR 0.97 2020     PTT:    Lab Results   Component Value Date    APTT 29.9 2020   [APTT  Urine Culture:  Negative    Imaging:   Impression   Probable mild epididymal orchitis on the right with no mass identified. Recommend urological follow-up.       Small hydrocele on the right.       Normal left testicle.      Impression/Plan: 40 yo M with discitis and R epididymitis.  -Complains of back pain and dysuria  -Urine culture negative  -Will order pyridium for dysuria  -Continue conservative management with ice, elevation and abx per ID recs  -Call with any questions       AMINA Viera

## 2021-09-24 NOTE — PROGRESS NOTES
Received order for PICC. Spoke with Gladys Paredes RN, pt is awaiting placement. Requests PICC team wait to place line until placement is established.

## 2021-09-24 NOTE — PROGRESS NOTES
NUTRITION NOTE   Admission Date: 9/23/2021     Type and Reason for Visit: Positive Nutrition Screen    NUTRITION RECOMMENDATIONS:   1. PO Diet: Continue current regular diet     NUTRITION ASSESSMENT:  False positive IP for wt loss. No significant wt changes noted per EMR review. Weight appears stable between 140-150lbs. Pt appetite also good, consuming % PO intake so far per meal documentation. Will monitor through admission. Patient admitted d/t back pain and urinary incontinence     PMH significant for:  IVDU, polysubstance abuse with Hep C infection, Lumbosacral discitis, recurrent skin and soft tissue infections, depression, and GERD    MALNUTRITION ASSESSMENT      Malnutrition Status: No malnutrition    NUTRITION DIAGNOSIS No nutrition diagnosis at this time. NUTRITION INTERVENTION  Food and/or Nutrient Delivery:     Nutrition Education/Counseling: The patient will still be monitored per nutrition standards of care. Consult dietitian if nutrition interventions essential to patient care is needed.      Erin Cain LD  [place]:  [phone]  Office:  [phone]

## 2021-09-24 NOTE — DISCHARGE SUMMARY
Hospital Discharge Summary    Patient's PCP: JORDY Cho CNP  Admit Date: 9/23/2021   Discharge Date: 9/24/2021    Admitting Physician: Dr. Lakia Villar MD  Discharge Physician: Dr. Shakira Bains MD   Consults: ID, orthopedic surgery and urology    HPI: Mr. Trice Dempsey presented on 9/23 after transfer from [facility] for complaints of back pain and urinary incontinence. He states that his back pain has been intermittently present since September of 2020. He initiated treatment for L5-S1 discitis on several occasions but always left AMA or terminated treatment early. Last antibiotic treatment late 2020 with no antibiotics since. States that back pain worsened ~4 months ago with onset of urinary symptoms including testicle pain, urinary incontinence, and dysuria beginning 1-2 months ago. Endorses fevers, night sweats, diarrhea, chest pain, and shortness of breath. Brief hospital course:  Given the concern of the patients presentation and the concern of the possible multi-factorial etiology contributing to patients symptomatology. Patient was admitted and evaluated and found to have:       Discharge Diagnoses:   #Acute on chronic Back Pain   #Chronic Lumbosacral (L5/S1) MRSA Discitis   Patient with a history of hospitalizations in 2020 for similar back pain. 1x BC positive for MRSA. Patient given trial of antibiotics on multiple occasions but always prematurely terminated treatment. Has not had antibiotics since late 2020. Reports back pain worsening over last several months with urinary incontinence developing last 2 months. Also with radiation down his legs with associated numbness and tingling.  Sensation/strength intact on exam.   -Thoracic MRI without any abnormalities and lumbar MRI showing edema and enhancement of L5-S1 area consistent with previously known discitis, prior myositis has resolved.   -neurosurgery consulted, no indication for acute intervention  -ID consulted: admission.  -consult to addiction services   -Patient no longer in custody, free to discharge to facility   -HIV testing, patient negative during 2020 hospitalizations   -ECHO in setting of IVU w/ chest pain and SOB   -- Pxt left AMA while down at echo suite        #Depression   Continue home regimen  -Bupropion, Olanzapine, Trazodone  - Pxt left AMA while down at echo suite         #GERD  Continue protonix 40mg PO daily   - Pxt left AMA while down at echo suite        #Hepatitis C infection, chronic  Update liver panel   - Will need F/u for txt  - Pxt left AMA while down at echo suite        #Tobacco Use   Nicotine patches   - Pxt left AMA while down at echo suite             Physical Exam: /73   Pulse 87   Temp 97.7 °F (36.5 °C) (Oral)   Resp 16   Ht 5' 9\" (1.753 m)   Wt 145 lb 1 oz (65.8 kg)   SpO2 98%   BMI 21.42 kg/m²     No results for input(s): POCGLU in the last 72 hours. - Pxt left AMA while down at echo suite    LABS:  Recent Labs     09/24/21  0733   WBC 9.0   HGB 13.4*         Recent Labs     09/24/21  0733      K 4.8      CO2 19*   BUN 16   CREATININE 1.0   GLUCOSE 87     No results for input(s): INR in the last 72 hours. Discharge Medications: - Pxt left AMA while down at echo suite         Activity:  Pxt left AMA while down at echo suite  Diet: - Pxt left AMA while down at echo suite  Wound Care: - Pxt left AMA while down at echo suite    Disposition: - Pxt left AMA while down at echo suite  Discharged Condition:- Pxt left AMA while down at echo suite  Follow Up: - Pxt left AMA while down at echo suite     Total time spent on discharge, finalizing medications, referrals and arranging outpatient follow up was more than 30 minutes    Thank you JORDY Lehman - CNP for the opportunity to be involved in this patients care. If you have any questions or concerns please feel free to contact me at [phone].

## 2021-09-24 NOTE — ED NOTES
Critical lab result received from [place]. Patient has positive blood culture for Staff [employer]. Dr. Raegan Olvera notified of results.       Jesus Manuel Tracy RN  09/24/21 0473

## 2021-09-24 NOTE — PROGRESS NOTES
Spoke with LPN at [facility]. If pt able to get midline vs PICC, [facility] senior living would be able to accept patient back but if he needs PICC, LPN is unable to legally care for PICC and Abx.

## 2021-09-24 NOTE — PROGRESS NOTES
Progress Note    Admit Date: 9/23/2021  Day: 3  Diet: ADULT DIET; Regular    CC: Back Pain, Urinary Incontinence     Interval history:     No acute events overnight    Patient is doing well without complaints    Patient is no longer in police custody, is free for release to facility     HPI:     Mr. Trice Dempsey presented on 9/23 after transfer from [facility] for complaints of back pain and urinary incontinence. He states that his back pain has been intermittently present since September of 2020. He initiated treatment for L5-S1 discitis on several occasions but always left AMA or terminated treatment early. Last antibiotic treatment late 2020 with no antibiotics since. States that back pain worsened ~4 months ago with onset of urinary symptoms including testicle pain, urinary incontinence, and dysuria beginning 1-2 months ago. Endorses fevers, night sweats, diarrhea, chest pain, and shortness of breath.        Medications:     Scheduled Meds:   phenazopyridine  200 mg Oral TID WC    lidocaine 1 % injection  5 mL IntraDERmal Once    sodium chloride flush  5-40 mL IntraVENous 2 times per day    traZODone  100 mg Oral Nightly    pantoprazole  40 mg Oral Daily    OLANZapine  10 mg Oral Nightly    melatonin  3 mg Oral Nightly    gabapentin  600 mg Oral TID    folic acid  1 mg Oral Daily    buPROPion  100 mg Oral BID    sodium chloride flush  10 mL IntraVENous 2 times per day    enoxaparin  40 mg SubCUTAneous Daily    methocarbamol  1,500 mg Oral 3 times per day    acetaminophen  1,000 mg Oral 4 times per day    lidocaine  2 patch TransDERmal Daily    levoFLOXacin  500 mg Oral Daily    nicotine  1 patch TransDERmal Daily     Continuous Infusions:   sodium chloride      sodium chloride      sodium chloride Stopped (09/23/21 1532)     PRN Meds:sodium chloride flush, sodium chloride, ibuprofen, busPIRone, sodium chloride flush, sodium chloride, ondansetron, polyethylene glycol, diazePAM, perflutren lipid microspheres, oxyCODONE    Objective:   Vitals:   T-max:  Patient Vitals for the past 8 hrs:   BP Temp Temp src Pulse Resp SpO2   09/24/21 0715 135/76 97.7 °F (36.5 °C) Oral 86 16 98 %   09/24/21 0223 124/77 96.5 °F (35.8 °C) Oral 65 16 96 %       Intake/Output Summary (Last 24 hours) at 9/24/2021 0959  Last data filed at 9/24/2021 0719  Gross per 24 hour   Intake 720 ml   Output 1800 ml   Net -1080 ml       Review of Systems   Constitutional: Negative for fever. HENT: Negative for sore throat and trouble swallowing. Eyes: Negative for visual disturbance. Respiratory: Positive for shortness of breath. Negative for cough. Cardiovascular: Positive for chest pain. Negative for palpitations. Gastrointestinal: Positive for diarrhea. Negative for abdominal pain, constipation, nausea and vomiting. Genitourinary: Positive for dysuria, frequency, testicular pain and urgency. Negative for flank pain. Musculoskeletal: Positive for back pain and neck pain. Neurological: Negative for headaches. Psychiatric/Behavioral: Negative for confusion. All other systems reviewed and are negative. Physical Exam  Vitals reviewed. Constitutional:       Appearance: Normal appearance. Eyes:      Extraocular Movements: Extraocular movements intact. Conjunctiva/sclera: Conjunctivae normal.      Pupils: Pupils are equal, round, and reactive to light. Neck:      Comments: Right submental swelling. Induration with mild erythema over right lateral neck   Cardiovascular:      Rate and Rhythm: Normal rate and regular rhythm. Pulses: Normal pulses. Heart sounds: Normal heart sounds. Pulmonary:      Effort: Pulmonary effort is normal.      Breath sounds: Normal breath sounds. Abdominal:      General: Abdomen is flat. Bowel sounds are normal.      Palpations: Abdomen is soft.    Genitourinary:     Comments: Right testicle pain to palpation  Musculoskeletal:         General: Normal range of motion. Cervical back: Normal range of motion. Skin:     General: Skin is warm and dry. Capillary Refill: Capillary refill takes less than 2 seconds. Comments: Multiple areas of scabbing and bruising   Neurological:      General: No focal deficit present. Mental Status: He is alert and oriented to person, place, and time. Mental status is at baseline. Psychiatric:         Mood and Affect: Mood normal.         Behavior: Behavior normal.         Thought Content: Thought content normal.         Judgment: Judgment normal.         LABS:    CBC:   Recent Labs     09/22/21 2108 09/23/21 0302 09/24/21  0733   WBC 10.9 8.9 9.0   HGB 12.5* 13.2* 13.4*   HCT 37.5* 39.6* 40.7    353 348   MCV 88.7 87.5 89.1     Renal:    Recent Labs     09/22/21 2108 09/23/21 0302 09/24/21  0733    136 137   K 4.7 3.7 4.8    100 104   CO2 26 24 19*   BUN 19 17 16   CREATININE 1.1 1.0 1.0   GLUCOSE 92 89 87   CALCIUM 9.3 9.1 9.2   ANIONGAP 8 12 14     Hepatic:   Recent Labs     09/22/21 2108   AST 32   ALT 39   BILITOT <0.2   PROT 7.8   LABALBU 4.4   ALKPHOS 141*     Troponin: No results for input(s): TROPONINI in the last 72 hours. BNP: No results for input(s): BNP in the last 72 hours. Lipids: No results for input(s): CHOL, HDL in the last 72 hours. Invalid input(s): LDLCALCU, TRIGLYCERIDE  ABGs:  No results for input(s): PHART, NKS6EDW, PO2ART, TCR9VOF, BEART, THGBART, P4ENRHJU, JHM6QPF in the last 72 hours. INR: No results for input(s): INR in the last 72 hours. Lactate: No results for input(s): LACTATE in the last 72 hours.   Cultures:  -----------------------------------------------------------------  RAD:   No orders to display       Assessment/Plan:   Mr. Kenyetta Hidalgo is a 41year old male presenting for back pain, urinary incontinence with painful testicles, and neck mass most consistent with known lumbosacral discitis, right-epididymitis, and cellulitic infections.      #Acute on chronic Back Pain   #Chronic Lumbosacral (L5/S1) MRSA Discitis   Patient with a history of hospitalizations in 2020 for similar back pain. 1x BC positive for MRSA. Patient given trial of antibiotics on multiple occasions but always prematurely terminated treatment. Has not had antibiotics since late 2020. Reports back pain worsening over last several months with urinary incontinence developing last 2 months. Also with radiation down his legs with associated numbness and tingling. Sensation/strength intact on exam.   -Thoracic MRI without any abnormalities and lumbar MRI showing edema and enhancement of L5-S1 area consistent with previously known discitis, prior myositis has resolved. -neurosurgery consulted, no indication for acute intervention  -ID consulted: Patient will need PICC and 6-8 weeks of vancomycin + ceftriaxone, will begin today 9/24  -Per ID: Check CBC w diff, CMP, ESR, CRP, vancomycin trough every Mon or Tue - FAX result to [phone]  -Patient will not need outpatient f/u with ID   -Blood Cx: growing coagulase negative staph, likely contaminate   -ECHO ordered   -ESR 25, CRP 15.7, Procal 0.24, Lactate 1.3   -multimodal pain management: tylenol, robaxin, gabapentin, lidocaine patches, PRN Oxy 5mg q4         #Testicle Pain   #Urinary Incontinence   #Right Epididymorchitis  Patient complaining of testicle pain over the last several months. Associated urinary incontinence, dysuria, uregency. Reports foul-smelling and discolored ejaculate.  Scrotal ultrasounds performed showing suggestion of right sided epididymitis.   -Urology consulted, agree with right epididymitis, appreciate recs  -UA wnl  -Urine Cx: NGTD, will follow   -bladder scan ordered   -On levo 500mg daily for 10 day course, day 2 today   -G/C testing pending         #R lateral neck mass  #Neck Cellulitis   # Ext jugular vein Thrombophlebitis   Patient endorsing right sided tender neck mass over last couple of weeks with jawline mass developing over last couple of days. Reports he does not inject drugs into this area of the neck. Patient reports frequent fevers and night sweats over last several months. CT soft tissue neck w contrast demonstrating subcutaneous soft tissue swelling overlying SCM with 70% narrowing of the EJV consistent with thrombophlebitis. Patient without trouble swallowing or airway compromise. -Vancomycin and Ceftriaxone as above        #PWUID (Person who use IV drugs), Polysubstance abuse  Patient endorses recent drug history including heroin, cannabis. Patient states that he would be open to speaking with addiction services during admission.  -consult to addiction services   -Patient no longer in custody, free to discharge to facility   -HIV testing, patient negative during 2020 hospitalizations   -ECHO in setting of IVU w/ chest pain and SOB         #Depression   Continue home regimen  -Bupropion, Olanzapine, Trazodone         #GERD  Continue protonix 40mg PO daily         #Hepatitis C infection, chronic  Update liver panel   - Will need F/u for txt       #Tobacco Use   Nicotine patches PRN        #DVT ppx   Enoxaparin 40mg SQ daily           Code Status:Full code  DISPO: IP . Will need extended IV antibx abt 6-8 weeks, in a supervised setting. Discussed with   Case mgt: likely to be a difficult placement in view of his IVDU, and hx of leaving facilities AMA. For DC with placement.      Earnstine Sea (Scribe)  9/23/2021,  9:55 AM   09/24/21  9:59 AM     Corey Marie MD.

## 2021-09-24 NOTE — PROGRESS NOTES
This RN received a call from ECHO stating that Pt wanting to leave AMA and was very eager to leave. This RN printed out AMA paperwork and took peripheral IV out. AMA paper signed.

## 2021-09-24 NOTE — PROGRESS NOTES
Kinsey Ball came out to [facility] with handcuffs in hand.  stated that \"Pt is in your care now\" referring to Hospital care.  stated that the assisted is no longer over his care due to money problems. Kinsey Ball left after he removed restraints off of Pt. This RN went to assess Pt and he was demanding a shower as well as a phone. This RN put on Pt bed alarm and instructed Pt that She would be back to assist him to get cleaned up in a little bit. This RN then went to tell Charge Nurse and Manager about the current events. Fall precautions in place.

## 2021-09-26 LAB
BLOOD CULTURE, ROUTINE: ABNORMAL
BLOOD CULTURE, ROUTINE: ABNORMAL
ORGANISM: ABNORMAL
ORGANISM: ABNORMAL

## 2021-09-27 LAB — CULTURE, BLOOD 2: NORMAL

## 2021-09-27 NOTE — RESULT ENCOUNTER NOTE
Culture reviewed, it appears that the patient left AGAINST MEDICAL ADVICE from [facility]  They were aware of this culture prior to leaving [place]. Please inform patient of the result and if any worsening symptoms, then instruct him to return to the emergency department. Otherwise, this patient will need to follow-up with primary doctor and infectious disease as an outpatient.

## 2021-10-28 NOTE — PROGRESS NOTES
Message sent to MD Fatima is requesting something to help him sleep tonight.  Thank you    Waiting on response detailed exam mouth

## 2021-11-27 ENCOUNTER — HOSPITAL ENCOUNTER (OUTPATIENT)
Age: 41
Setting detail: OBSERVATION
Discharge: HOME | End: 2021-12-01
Attending: EMERGENCY MEDICINE | Admitting: INTERNAL MEDICINE
Payer: COMMERCIAL

## 2021-11-27 ENCOUNTER — APPOINTMENT (OUTPATIENT)
Dept: ULTRASOUND IMAGING | Age: 41
End: 2021-11-27
Payer: COMMERCIAL

## 2021-11-27 ENCOUNTER — APPOINTMENT (OUTPATIENT)
Dept: GENERAL RADIOLOGY | Age: 41
End: 2021-11-27
Payer: COMMERCIAL

## 2021-11-27 DIAGNOSIS — R07.9 CHEST PAIN, UNSPECIFIED TYPE: ICD-10-CM

## 2021-11-27 DIAGNOSIS — M46.46 DISCITIS OF LUMBAR REGION: ICD-10-CM

## 2021-11-27 DIAGNOSIS — J18.9 PNEUMONIA DUE TO INFECTIOUS ORGANISM, UNSPECIFIED LATERALITY, UNSPECIFIED PART OF LUNG: ICD-10-CM

## 2021-11-27 DIAGNOSIS — F19.90 IVDU (INTRAVENOUS DRUG USER): Primary | ICD-10-CM

## 2021-11-27 LAB
A/G RATIO: 1 (ref 1.1–2.2)
ALBUMIN SERPL-MCNC: 3.8 G/DL (ref 3.4–5)
ALP BLD-CCNC: 130 U/L (ref 40–129)
ALT SERPL-CCNC: 59 U/L (ref 10–40)
AMPHETAMINE SCREEN, URINE: POSITIVE
ANION GAP SERPL CALCULATED.3IONS-SCNC: 11 MMOL/L (ref 3–16)
APTT: 34.4 SEC (ref 26.2–38.6)
AST SERPL-CCNC: 41 U/L (ref 15–37)
BARBITURATE SCREEN URINE: ABNORMAL
BASOPHILS ABSOLUTE: 0.1 K/UL (ref 0–0.2)
BASOPHILS RELATIVE PERCENT: 1 %
BENZODIAZEPINE SCREEN, URINE: ABNORMAL
BILIRUB SERPL-MCNC: 0.3 MG/DL (ref 0–1)
BILIRUBIN URINE: NEGATIVE
BLOOD, URINE: NEGATIVE
BUN BLDV-MCNC: 21 MG/DL (ref 7–20)
CALCIUM SERPL-MCNC: 8.9 MG/DL (ref 8.3–10.6)
CANNABINOID SCREEN URINE: POSITIVE
CHLORIDE BLD-SCNC: 99 MMOL/L (ref 99–110)
CLARITY: CLEAR
CO2: 26 MMOL/L (ref 21–32)
COCAINE METABOLITE SCREEN URINE: ABNORMAL
COLOR: YELLOW
CREAT SERPL-MCNC: 1 MG/DL (ref 0.9–1.3)
EOSINOPHILS ABSOLUTE: 0.3 K/UL (ref 0–0.6)
EOSINOPHILS RELATIVE PERCENT: 3.1 %
ETHANOL: NORMAL MG/DL (ref 0–0.08)
GFR AFRICAN AMERICAN: >60
GFR NON-AFRICAN AMERICAN: >60
GLUCOSE BLD-MCNC: 116 MG/DL (ref 70–99)
GLUCOSE URINE: NEGATIVE MG/DL
HCT VFR BLD CALC: 37.5 % (ref 40.5–52.5)
HEMOGLOBIN: 12.6 G/DL (ref 13.5–17.5)
INR BLD: 1 (ref 0.88–1.12)
KETONES, URINE: NEGATIVE MG/DL
LACTIC ACID, SEPSIS: 1.2 MMOL/L (ref 0.4–1.9)
LEUKOCYTE ESTERASE, URINE: NEGATIVE
LYMPHOCYTES ABSOLUTE: 2.8 K/UL (ref 1–5.1)
LYMPHOCYTES RELATIVE PERCENT: 25.1 %
Lab: ABNORMAL
MCH RBC QN AUTO: 28.3 PG (ref 26–34)
MCHC RBC AUTO-ENTMCNC: 33.6 G/DL (ref 31–36)
MCV RBC AUTO: 84.3 FL (ref 80–100)
METHADONE SCREEN, URINE: ABNORMAL
MICROSCOPIC EXAMINATION: NORMAL
MONOCYTES ABSOLUTE: 1.1 K/UL (ref 0–1.3)
MONOCYTES RELATIVE PERCENT: 10.1 %
NEUTROPHILS ABSOLUTE: 6.7 K/UL (ref 1.7–7.7)
NEUTROPHILS RELATIVE PERCENT: 60.7 %
NITRITE, URINE: NEGATIVE
OPIATE SCREEN URINE: ABNORMAL
OXYCODONE URINE: ABNORMAL
PDW BLD-RTO: 13.6 % (ref 12.4–15.4)
PH UA: 6
PH UA: 6 (ref 5–8)
PHENCYCLIDINE SCREEN URINE: ABNORMAL
PLATELET # BLD: 388 K/UL (ref 135–450)
PMV BLD AUTO: 7.1 FL (ref 5–10.5)
POTASSIUM REFLEX MAGNESIUM: 3.9 MMOL/L (ref 3.5–5.1)
PROPOXYPHENE SCREEN: ABNORMAL
PROTEIN UA: NEGATIVE MG/DL
PROTHROMBIN TIME: 11.3 SEC (ref 9.9–12.7)
RBC # BLD: 4.45 M/UL (ref 4.2–5.9)
SEDIMENTATION RATE, ERYTHROCYTE: 24 MM/HR (ref 0–15)
SODIUM BLD-SCNC: 136 MMOL/L (ref 136–145)
SPECIFIC GRAVITY UA: 1.02 (ref 1–1.03)
TOTAL PROTEIN: 7.7 G/DL (ref 6.4–8.2)
TROPONIN: <0.01 NG/ML
URINE REFLEX TO CULTURE: NORMAL
URINE TYPE: NORMAL
UROBILINOGEN, URINE: 0.2 E.U./DL
WBC # BLD: 11.1 K/UL (ref 4–11)

## 2021-11-27 PROCEDURE — 2580000003 HC RX 258: Performed by: EMERGENCY MEDICINE

## 2021-11-27 PROCEDURE — 85652 RBC SED RATE AUTOMATED: CPT

## 2021-11-27 PROCEDURE — 96367 TX/PROPH/DG ADDL SEQ IV INF: CPT

## 2021-11-27 PROCEDURE — 96375 TX/PRO/DX INJ NEW DRUG ADDON: CPT

## 2021-11-27 PROCEDURE — 84145 PROCALCITONIN (PCT): CPT

## 2021-11-27 PROCEDURE — 85610 PROTHROMBIN TIME: CPT

## 2021-11-27 PROCEDURE — 80307 DRUG TEST PRSMV CHEM ANLYZR: CPT

## 2021-11-27 PROCEDURE — 81003 URINALYSIS AUTO W/O SCOPE: CPT

## 2021-11-27 PROCEDURE — 85025 COMPLETE CBC W/AUTO DIFF WBC: CPT

## 2021-11-27 PROCEDURE — 76870 US EXAM SCROTUM: CPT

## 2021-11-27 PROCEDURE — 80053 COMPREHEN METABOLIC PANEL: CPT

## 2021-11-27 PROCEDURE — 96365 THER/PROPH/DIAG IV INF INIT: CPT

## 2021-11-27 PROCEDURE — 96368 THER/DIAG CONCURRENT INF: CPT

## 2021-11-27 PROCEDURE — 82077 ASSAY SPEC XCP UR&BREATH IA: CPT

## 2021-11-27 PROCEDURE — 99285 EMERGENCY DEPT VISIT HI MDM: CPT

## 2021-11-27 PROCEDURE — 36415 COLL VENOUS BLD VENIPUNCTURE: CPT

## 2021-11-27 PROCEDURE — 84484 ASSAY OF TROPONIN QUANT: CPT

## 2021-11-27 PROCEDURE — 83605 ASSAY OF LACTIC ACID: CPT

## 2021-11-27 PROCEDURE — 93005 ELECTROCARDIOGRAM TRACING: CPT | Performed by: EMERGENCY MEDICINE

## 2021-11-27 PROCEDURE — 86140 C-REACTIVE PROTEIN: CPT

## 2021-11-27 PROCEDURE — 6360000002 HC RX W HCPCS: Performed by: EMERGENCY MEDICINE

## 2021-11-27 PROCEDURE — 93975 VASCULAR STUDY: CPT

## 2021-11-27 PROCEDURE — 87040 BLOOD CULTURE FOR BACTERIA: CPT

## 2021-11-27 PROCEDURE — 71045 X-RAY EXAM CHEST 1 VIEW: CPT

## 2021-11-27 PROCEDURE — 85730 THROMBOPLASTIN TIME PARTIAL: CPT

## 2021-11-27 RX ORDER — 0.9 % SODIUM CHLORIDE 0.9 %
1000 INTRAVENOUS SOLUTION INTRAVENOUS ONCE
Status: COMPLETED | OUTPATIENT
Start: 2021-11-27 | End: 2021-11-27

## 2021-11-27 RX ADMIN — SODIUM CHLORIDE 1000 ML: 9 INJECTION, SOLUTION INTRAVENOUS at 22:10

## 2021-11-27 RX ADMIN — VANCOMYCIN HYDROCHLORIDE 1000 MG: 1 INJECTION, POWDER, LYOPHILIZED, FOR SOLUTION INTRAVENOUS at 23:41

## 2021-11-27 RX ADMIN — CEFTRIAXONE SODIUM 1000 MG: 1 INJECTION, POWDER, FOR SOLUTION INTRAMUSCULAR; INTRAVENOUS at 22:50

## 2021-11-27 ASSESSMENT — PAIN DESCRIPTION - ORIENTATION: ORIENTATION: RIGHT;LEFT

## 2021-11-27 ASSESSMENT — PAIN DESCRIPTION - LOCATION: LOCATION: CHEST

## 2021-11-27 ASSESSMENT — PAIN SCALES - GENERAL: PAINLEVEL_OUTOF10: 7

## 2021-11-27 ASSESSMENT — PAIN DESCRIPTION - PAIN TYPE: TYPE: ACUTE PAIN

## 2021-11-28 ENCOUNTER — APPOINTMENT (OUTPATIENT)
Dept: GENERAL RADIOLOGY | Age: 41
End: 2021-11-28
Payer: COMMERCIAL

## 2021-11-28 LAB
C-REACTIVE PROTEIN: 5.9 MG/L (ref 0–5.1)
EKG ATRIAL RATE: 112 BPM
EKG DIAGNOSIS: NORMAL
EKG P AXIS: 70 DEGREES
EKG P-R INTERVAL: 146 MS
EKG Q-T INTERVAL: 316 MS
EKG QRS DURATION: 82 MS
EKG QTC CALCULATION (BAZETT): 431 MS
EKG R AXIS: 65 DEGREES
EKG T AXIS: 64 DEGREES
EKG VENTRICULAR RATE: 112 BPM
PROCALCITONIN: 0.15 NG/ML (ref 0–0.15)
SPECIMEN STATUS: NORMAL

## 2021-11-28 PROCEDURE — 6360000002 HC RX W HCPCS: Performed by: INTERNAL MEDICINE

## 2021-11-28 PROCEDURE — G0378 HOSPITAL OBSERVATION PER HR: HCPCS

## 2021-11-28 PROCEDURE — 6360000002 HC RX W HCPCS: Performed by: EMERGENCY MEDICINE

## 2021-11-28 PROCEDURE — 6370000000 HC RX 637 (ALT 250 FOR IP): Performed by: INTERNAL MEDICINE

## 2021-11-28 PROCEDURE — 72100 X-RAY EXAM L-S SPINE 2/3 VWS: CPT

## 2021-11-28 PROCEDURE — 96376 TX/PRO/DX INJ SAME DRUG ADON: CPT

## 2021-11-28 PROCEDURE — 93010 ELECTROCARDIOGRAM REPORT: CPT | Performed by: INTERNAL MEDICINE

## 2021-11-28 PROCEDURE — 2580000003 HC RX 258: Performed by: INTERNAL MEDICINE

## 2021-11-28 PROCEDURE — 96366 THER/PROPH/DIAG IV INF ADDON: CPT

## 2021-11-28 PROCEDURE — 96375 TX/PRO/DX INJ NEW DRUG ADDON: CPT

## 2021-11-28 PROCEDURE — 99254 IP/OBS CNSLTJ NEW/EST MOD 60: CPT | Performed by: INTERNAL MEDICINE

## 2021-11-28 PROCEDURE — 96372 THER/PROPH/DIAG INJ SC/IM: CPT

## 2021-11-28 PROCEDURE — 6370000000 HC RX 637 (ALT 250 FOR IP): Performed by: EMERGENCY MEDICINE

## 2021-11-28 PROCEDURE — 1200000000 HC SEMI PRIVATE

## 2021-11-28 RX ORDER — MIRTAZAPINE 15 MG/1
15 TABLET, FILM COATED ORAL NIGHTLY
Status: DISCONTINUED | OUTPATIENT
Start: 2021-11-28 | End: 2021-12-01 | Stop reason: HOSPADM

## 2021-11-28 RX ORDER — ACETAMINOPHEN 650 MG/1
650 SUPPOSITORY RECTAL EVERY 6 HOURS PRN
Status: DISCONTINUED | OUTPATIENT
Start: 2021-11-28 | End: 2021-12-01 | Stop reason: HOSPADM

## 2021-11-28 RX ORDER — DIAZEPAM 5 MG/1
5 TABLET ORAL ONCE
Status: COMPLETED | OUTPATIENT
Start: 2021-11-28 | End: 2021-11-28

## 2021-11-28 RX ORDER — ONDANSETRON 4 MG/1
4 TABLET, ORALLY DISINTEGRATING ORAL EVERY 8 HOURS PRN
Status: DISCONTINUED | OUTPATIENT
Start: 2021-11-28 | End: 2021-12-01 | Stop reason: HOSPADM

## 2021-11-28 RX ORDER — KETOROLAC TROMETHAMINE 30 MG/ML
30 INJECTION, SOLUTION INTRAMUSCULAR; INTRAVENOUS EVERY 6 HOURS PRN
Status: DISCONTINUED | OUTPATIENT
Start: 2021-11-28 | End: 2021-12-01 | Stop reason: HOSPADM

## 2021-11-28 RX ORDER — ACETAMINOPHEN 325 MG/1
650 TABLET ORAL EVERY 6 HOURS PRN
Status: DISCONTINUED | OUTPATIENT
Start: 2021-11-28 | End: 2021-12-01 | Stop reason: HOSPADM

## 2021-11-28 RX ORDER — HYDROCODONE BITARTRATE AND ACETAMINOPHEN 5; 325 MG/1; MG/1
2 TABLET ORAL EVERY 4 HOURS PRN
Status: DISCONTINUED | OUTPATIENT
Start: 2021-11-28 | End: 2021-11-28

## 2021-11-28 RX ORDER — BUPROPION HYDROCHLORIDE 100 MG/1
100 TABLET, EXTENDED RELEASE ORAL NIGHTLY
Status: DISCONTINUED | OUTPATIENT
Start: 2021-11-29 | End: 2021-12-01 | Stop reason: HOSPADM

## 2021-11-28 RX ORDER — SODIUM CHLORIDE 0.9 % (FLUSH) 0.9 %
5-40 SYRINGE (ML) INJECTION EVERY 12 HOURS SCHEDULED
Status: DISCONTINUED | OUTPATIENT
Start: 2021-11-28 | End: 2021-12-01 | Stop reason: HOSPADM

## 2021-11-28 RX ORDER — POLYETHYLENE GLYCOL 3350 17 G/17G
17 POWDER, FOR SOLUTION ORAL DAILY PRN
Status: DISCONTINUED | OUTPATIENT
Start: 2021-11-28 | End: 2021-12-01 | Stop reason: HOSPADM

## 2021-11-28 RX ORDER — NICOTINE 21 MG/24HR
1 PATCH, TRANSDERMAL 24 HOURS TRANSDERMAL ONCE
Status: COMPLETED | OUTPATIENT
Start: 2021-11-28 | End: 2021-11-29

## 2021-11-28 RX ORDER — SODIUM CHLORIDE 0.9 % (FLUSH) 0.9 %
5-40 SYRINGE (ML) INJECTION PRN
Status: DISCONTINUED | OUTPATIENT
Start: 2021-11-28 | End: 2021-12-01 | Stop reason: HOSPADM

## 2021-11-28 RX ORDER — MIRTAZAPINE 15 MG/1
TABLET, FILM COATED ORAL
COMMUNITY
Start: 2021-06-24

## 2021-11-28 RX ORDER — OXYCODONE AND ACETAMINOPHEN 10; 325 MG/1; MG/1
1 TABLET ORAL EVERY 4 HOURS PRN
Status: DISCONTINUED | OUTPATIENT
Start: 2021-11-28 | End: 2021-12-01 | Stop reason: HOSPADM

## 2021-11-28 RX ORDER — SODIUM CHLORIDE 9 MG/ML
25 INJECTION, SOLUTION INTRAVENOUS PRN
Status: DISCONTINUED | OUTPATIENT
Start: 2021-11-28 | End: 2021-12-01 | Stop reason: HOSPADM

## 2021-11-28 RX ORDER — BUPROPION HYDROCHLORIDE 100 MG/1
100 TABLET, EXTENDED RELEASE ORAL 2 TIMES DAILY
Status: DISCONTINUED | OUTPATIENT
Start: 2021-11-28 | End: 2021-11-28

## 2021-11-28 RX ORDER — ONDANSETRON 2 MG/ML
4 INJECTION INTRAMUSCULAR; INTRAVENOUS EVERY 6 HOURS PRN
Status: DISCONTINUED | OUTPATIENT
Start: 2021-11-28 | End: 2021-12-01 | Stop reason: HOSPADM

## 2021-11-28 RX ORDER — KETOROLAC TROMETHAMINE 30 MG/ML
15 INJECTION, SOLUTION INTRAMUSCULAR; INTRAVENOUS ONCE
Status: COMPLETED | OUTPATIENT
Start: 2021-11-28 | End: 2021-11-28

## 2021-11-28 RX ORDER — HYDROCODONE BITARTRATE AND ACETAMINOPHEN 5; 325 MG/1; MG/1
1 TABLET ORAL EVERY 4 HOURS PRN
Status: DISCONTINUED | OUTPATIENT
Start: 2021-11-28 | End: 2021-11-28

## 2021-11-28 RX ORDER — GABAPENTIN 300 MG/1
600 CAPSULE ORAL 3 TIMES DAILY
Status: DISCONTINUED | OUTPATIENT
Start: 2021-11-28 | End: 2021-12-01 | Stop reason: HOSPADM

## 2021-11-28 RX ADMIN — Medication 10 ML: at 13:00

## 2021-11-28 RX ADMIN — HYDROCODONE BITARTRATE AND ACETAMINOPHEN 2 TABLET: 5; 325 TABLET ORAL at 18:03

## 2021-11-28 RX ADMIN — VANCOMYCIN HYDROCHLORIDE 750 MG: 750 INJECTION, POWDER, LYOPHILIZED, FOR SOLUTION INTRAVENOUS at 13:03

## 2021-11-28 RX ADMIN — SODIUM CHLORIDE, PRESERVATIVE FREE 10 ML: 5 INJECTION INTRAVENOUS at 20:54

## 2021-11-28 RX ADMIN — HYDROCODONE BITARTRATE AND ACETAMINOPHEN 1 TABLET: 5; 325 TABLET ORAL at 09:21

## 2021-11-28 RX ADMIN — GABAPENTIN 600 MG: 300 CAPSULE ORAL at 16:21

## 2021-11-28 RX ADMIN — SODIUM CHLORIDE 25 ML: 9 INJECTION, SOLUTION INTRAVENOUS at 13:02

## 2021-11-28 RX ADMIN — KETOROLAC TROMETHAMINE 15 MG: 30 INJECTION, SOLUTION INTRAMUSCULAR; INTRAVENOUS at 00:23

## 2021-11-28 RX ADMIN — KETOROLAC TROMETHAMINE 30 MG: 30 INJECTION, SOLUTION INTRAMUSCULAR; INTRAVENOUS at 16:21

## 2021-11-28 RX ADMIN — Medication 10 ML: at 16:22

## 2021-11-28 RX ADMIN — ENOXAPARIN SODIUM 40 MG: 100 INJECTION SUBCUTANEOUS at 11:36

## 2021-11-28 RX ADMIN — GABAPENTIN 600 MG: 300 CAPSULE ORAL at 11:36

## 2021-11-28 RX ADMIN — MIRTAZAPINE 15 MG: 15 TABLET, FILM COATED ORAL at 20:53

## 2021-11-28 RX ADMIN — KETOROLAC TROMETHAMINE 30 MG: 30 INJECTION, SOLUTION INTRAMUSCULAR; INTRAVENOUS at 09:21

## 2021-11-28 RX ADMIN — HYDROCODONE BITARTRATE AND ACETAMINOPHEN 2 TABLET: 5; 325 TABLET ORAL at 13:06

## 2021-11-28 RX ADMIN — DIAZEPAM 5 MG: 5 TABLET ORAL at 00:23

## 2021-11-28 RX ADMIN — GABAPENTIN 600 MG: 300 CAPSULE ORAL at 20:53

## 2021-11-28 RX ADMIN — HYDROMORPHONE HYDROCHLORIDE 0.5 MG: 1 INJECTION, SOLUTION INTRAMUSCULAR; INTRAVENOUS; SUBCUTANEOUS at 20:53

## 2021-11-28 ASSESSMENT — PAIN SCALES - GENERAL
PAINLEVEL_OUTOF10: 8
PAINLEVEL_OUTOF10: 7
PAINLEVEL_OUTOF10: 6
PAINLEVEL_OUTOF10: 10
PAINLEVEL_OUTOF10: 7
PAINLEVEL_OUTOF10: 6
PAINLEVEL_OUTOF10: 7
PAINLEVEL_OUTOF10: 8

## 2021-11-28 ASSESSMENT — PAIN DESCRIPTION - ORIENTATION: ORIENTATION: RIGHT;LEFT

## 2021-11-28 ASSESSMENT — PAIN DESCRIPTION - PAIN TYPE
TYPE: ACUTE PAIN
TYPE: ACUTE PAIN;CHRONIC PAIN
TYPE: CHRONIC PAIN

## 2021-11-28 ASSESSMENT — PAIN DESCRIPTION - LOCATION
LOCATION: BACK

## 2021-11-28 ASSESSMENT — PAIN DESCRIPTION - ONSET: ONSET: ON-GOING

## 2021-11-28 ASSESSMENT — PAIN DESCRIPTION - FREQUENCY: FREQUENCY: CONTINUOUS

## 2021-11-28 ASSESSMENT — PAIN DESCRIPTION - DESCRIPTORS: DESCRIPTORS: CONSTANT;JABBING

## 2021-11-28 NOTE — ED NOTES
Pt crying and shaking during triage. Unable to help pt calm down with emotional support. Best EKG obtainable by ED Tech.      Mihaela Cash RN  11/27/21 9733 The patient is a 85y Female complaining of breast lump.

## 2021-11-28 NOTE — H&P
Hospital Medicine History & Physical      PCP: Valentino Sings, APRN - CNP    Date of Admission: 11/27/2021    Date of Service: Pt seen/examined on 11/28/21 and Admitted to Inpatient with expected LOS greater than two midnights due to medical therapy. Chief Complaint:  Back pain      History Of Present Illness:    41 y.o. male who presented to [facility] with back pain. Patient has a long history of IVDU. He was diagnosed with lumbar discitis two months ago from MRSA related to IVDU. He was discharged to a SNF for planned IV abx but left AMA a few days into treatment. He has persistent back pain since then. He has been in contact with Infectious disease but has not been restarted on abx since he left AMA. He has been using meth recently. He states he has only been smoking it recently. He denies fevers, chills, SOB, chest pain, nausea or diarrhea. Past Medical History:          Diagnosis Date    Back pain     Cancer ([facility]     Pancreatic cancer    Depression 12/09/2019    GERD (gastroesophageal reflux disease)     Hepatitis C     Intentional drug overdose ([facility]     MRSA (methicillin resistant staph aureus) culture positive 09/25/2020    L5 disc asp    Polysubstance abuse ([facility]     Wears glasses        Past Surgical History:          Procedure Laterality Date    APPENDECTOMY      IR PERC ASPIRATION INTERVERT DISC  11/25/2020    IR PERC ASPIRATION INTERVERT [address] 11/25/2020 [facility] SPECIAL PROCEDURES    SHOULDER SURGERY Left 04/01/2014    UPPER GASTROINTESTINAL ENDOSCOPY  01/01/2014       Medications Prior to Admission:      Prior to Admission medications    Medication Sig Start Date End Date Taking?  Authorizing Provider   mirtazapine (REMERON) 15 MG tablet Take by mouth 6/24/21  Yes Historical Provider, MD   mupirocin (BACTROBAN) 2 % ointment Apply topically 3 times daily 6/24/21  Yes Historical Provider, MD   traZODone (DESYREL) 100 MG tablet Take 100 mg by mouth nightly   Yes Historical Provider, MD   lidocaine-prilocaine (EMLA) 2.5-2.5 % cream Apply to the most painful wounds once daily at time of dressing change, as needed for pain -- leave on for 20 minutes and then wipe off before applying antibiotic cream. 1/6/21  Yes Roland Leung MD   ibuprofen (ADVIL;MOTRIN) 800 MG tablet Take 800 mg by mouth every 8 hours as needed for Pain    Yes Historical Provider, MD   melatonin 3 MG TABS tablet Take 1 tablet by mouth nightly 11/28/20  Yes Vipul Akers MD   busPIRone (BUSPAR) 15 MG tablet Take 15 mg by mouth 3 times daily as needed Indications: Feeling Anxious   Yes Historical Provider, MD   gabapentin (NEURONTIN) 600 MG tablet Take 600 mg by mouth 3 times daily. Yes Historical Provider, MD   buPROPion [facility]) 100 MG extended release tablet Take 100 mg by mouth 2 times daily 8/21/20  Yes Historical Provider, MD   OLANZapine (ZYPREXA) 10 MG tablet Take 1 tablet by mouth nightly 12/9/19  Yes Shila Stanford MD       Allergies:  Patient has no known allergies. Social History:      The patient currently lives at home    TOBACCO:   reports that he has been smoking cigarettes. He has been smoking about 1.00 pack per day. He has never used smokeless tobacco.  ETOH:   reports no history of alcohol use. E-Cigarettes/Vaping Use     Questions Responses    E-Cigarette/Vaping Use Never User    Start Date     Passive Exposure     Quit Date     Counseling Given     Comments             Family History:      Reviewed in detail and negative for DM, CVA. Positive as follows:        Problem Relation Age of Onset    Cancer Mother     Heart Disease Father        REVIEW OF SYSTEMS COMPLETED:   Pertinent positives as noted in the HPI. All other systems reviewed and negative.     PHYSICAL EXAM PERFORMED:    /60   Pulse 60   Temp 97.6 °F (36.4 °C) (Oral)   Resp 16   Ht 5' 9\" (1.753 m)   Wt 150 lb (68 kg)   SpO2 97%   BMI 22.15 kg/m²     General appearance:  No apparent distress, identified. US SCROTUM AND TESTICLES   Final Result   Unremarkable testicular ultrasound with normal Doppler flow. XR CHEST PORTABLE   Final Result   Suspect right basilar pneumonia. US DUP ABD PEL RETRO SCROT COMPLETE    (Results Pending)       ASSESSMENT:    Active Hospital Problems    Diagnosis Date Noted    Discitis [M46.40] 09/22/2021         PLAN:  Sepsis 2/2 lumbar discitis  - presented with tachycardia, leukocytosis. Lactate WNL  - IVF bolus ordered  - infection due to IVDU  - cultures in past positive for MRSA  - ID consulted  - started on vanc  - blood cultures ordered  - pain control ordered    Amphetamine abuse, IVDU  - tox screen positive for amphetamines  - counseling given    Anxiety/depression  - mood stable  - continue home wellbutrin, remeron    DVT Prophylaxis: lovenox  Diet: ADULT DIET; Regular  Code Status: Full Code    PT/OT Eval Status: not ordered     Dispo - at least two days       Zoe Fink MD    Thank you JORDY Natarajan CNP for the opportunity to be involved in this patient's care. If you have any questions or concerns please feel free to contact me at [phone].

## 2021-11-28 NOTE — ED NOTES
Bed: 19  Expected date:   Expected time:   Means of arrival:   Comments:   [facility] [address] [place] [address], [facility]  11/27/21 2113

## 2021-11-28 NOTE — ED NOTES
Bedside report given to [facility], [facility]. from C-5. [facility] confirmed tele box 23.       Kya Weaver RN  11/28/21 9638

## 2021-11-28 NOTE — ED PROVIDER NOTES
[address] ENCOUNTER      Pt Name: Salena Perez  MRN: [id number]  [place] 1980  Date of evaluation: 11/27/2021  Provider: Raúl Knox MD    CHIEF COMPLAINT       Chief Complaint   Patient presents with    Chest Pain     Pt pulled over by police for warrants. Pt now c/o CP. Pt states hx MRSA in spine and heart. States CP x 2 days. HISTORY OF PRESENT ILLNESS   (Location/Symptom, Timing/Onset, Context/Setting, Quality, Duration, Modifying Factors, Severity)  Note limiting factors. Salena Perez is a 41 y.o. male with past medical history of polysubstance abuse with IV drug abuse and resultant lumbar spinal discitis noncompliant with outpatient treatments here today complaining of chest pain, shortness of breath low back pain    Patient states that he was on his way to the hospital today when he was pulled over by the police for warrants. He was reportedly complaining of chest and low back pain and brought to the hospital.  He was released by the police but states that for weeks he has been having an aching chest pain associated with a mildly productive cough. States he has chronic low back pain that is nonradiating he denies numbness, tingling or weakness in his lower legs. No incontinence. No abdominal pain. States he has low-grade fevers regularly. Last used IV drugs 2 weeks ago. Patient has known lumbar discitis and was supposed to be on 6 to 8 weeks of IV vancomycin and Rocephin but left AMA prior to completion of his work-up in the hospital just under 2 months ago. Patient states he gets very anxious and worried when he is admitted to the hospital and his anxiety gets the better of him and he leaves even though he knows he should not. HPI    Nursing Notes were reviewed.     REVIEW OF SYSTEMS    (2-9 systems for level 4, 10 or more for level 5)     Review of Systems    Please see HPI for pertinent positive and negative review of system findings. A full 10 system ROS was performed and otherwise negative. PAST MEDICAL HISTORY     Past Medical History:   Diagnosis Date    Back pain     Cancer ([facility]     Pancreatic cancer    Depression 12/09/2019    GERD (gastroesophageal reflux disease)     Hepatitis C     [facility] ([facility]     MRSA (methicillin resistant staph aureus) culture positive 09/25/2020    L5 disc asp    Polysubstance abuse ([facility]     Wears glasses          SURGICAL HISTORY       Past Surgical History:   Procedure Laterality Date    APPENDECTOMY      IR PERC ASPIRATION INTERVERT [facility]  11/25/2020    IR PERC ASPIRATION INTERVERT [facility] [address] 11/25/2020 [facility] SPECIAL PROCEDURES    SHOULDER SURGERY Left 04/01/2014    UPPER GASTROINTESTINAL ENDOSCOPY  01/01/2014         CURRENT MEDICATIONS       Current Discharge Medication List      CONTINUE these medications which have NOT CHANGED    Details   mirtazapine (REMERON) 15 MG tablet Take by mouth      mupirocin (BACTROBAN) 2 % ointment Apply topically 3 times daily      traZODone (DESYREL) 100 MG tablet Take 100 mg by mouth nightly      lidocaine-prilocaine (EMLA) 2.5-2.5 % cream Apply to the most painful wounds once daily at time of dressing change, as needed for pain -- leave on for 20 minutes and then wipe off before applying antibiotic cream.  Qty: 60 g, Refills: 1      ibuprofen (ADVIL;MOTRIN) 800 MG tablet Take 800 mg by mouth every 8 hours as needed for Pain       melatonin 3 MG TABS tablet Take 1 tablet by mouth nightly  Qty: 30 tablet, Refills: 3      busPIRone (BUSPAR) 15 MG tablet Take 15 mg by mouth 3 times daily as needed Indications: Feeling Anxious      gabapentin (NEURONTIN) 600 MG tablet Take 600 mg by mouth 3 times daily.        buPROPion (WELLBUTRIN SR) 100 MG extended release tablet Take 100 mg by mouth 2 times daily      OLANZapine (ZYPREXA) 10 MG tablet Take 1 tablet by mouth nightly  Qty: 30 tablet, Refills: 0             ALLERGIES     Patient has Not on file   Housing Stability:     Unable to Pay for Housing in the Last Year: Not on file    Number of Places Lived in the Last Year: Not on file    Unstable Housing in the Last Year: Not on file       SCREENINGS    Kimmy Coma Scale  Eye Opening: Spontaneous  Best Verbal Response: Oriented  Best Motor Response: Obeys commands  Kimmy Coma Scale Score: 15          PHYSICAL EXAM    (up to 7 for level 4, 8 or more for level 5)     ED Triage Vitals   BP Temp Temp Source Pulse Resp SpO2 Height Weight   11/27/21 2112 11/27/21 2112 11/27/21 2105 11/27/21 2112 11/27/21 2112 11/27/21 2112 11/27/21 2105 11/27/21 2105   (!) 144/100 99 °F (37.2 °C) Oral 114 30 99 % 5' 9\" (1.753 m) 150 lb (68 kg)       Physical Exam    General appearance:  Cooperative. No acute distress. Skin:  Warm. Dry. Eye:  Extraocular movements intact. Ears, nose, mouth and throat:  Oral mucosa moist,  Neck:  Trachea midline. Heart: Tachycardic but regular  Perfusion:  intact  Respiratory:  Lungs clear to auscultation bilaterally. Respirations nonlabored. Abdominal:   Non distended. Nontender  Neurological:  Alert and oriented x 3. Moves all extremities spontaneously. Intact sensation in the bilateral lower extremities. No saddle anesthesia. Strength normal with flexion extension of the bilateral hips, knees and ankles. Can walk without difficulty. No saddle anesthesia  Musculoskeletal:   Normal ROM, no deformities.   Negative l straight leg test and lower legs          Psychiatric:  Normal mood      DIAGNOSTIC RESULTS       Labs Reviewed   CBC WITH AUTO DIFFERENTIAL - Abnormal; Notable for the following components:       Result Value    WBC 11.1 (*)     Hemoglobin 12.6 (*)     Hematocrit 37.5 (*)     All other components within normal limits    Narrative:     Performed at:  [facility]  [address] [place] [address],  [place], [address] [place] [address]   Phone [phone]   COMPREHENSIVE METABOLIC PANEL W/ REFLEX TO MG FOR LOW K - Abnormal; Notable for the following components:    Glucose 116 (*)     BUN 21 (*)     Albumin/Globulin Ratio 1.0 (*)     Alkaline Phosphatase 130 (*)     ALT 59 (*)     AST 41 (*)     All other components within normal limits    Narrative:     Performed at:  [address], [address]   Phone [phone]   [address] - Abnormal; Notable for the following components:    Amphetamine Screen, Urine POSITIVE (*)     Cannabinoid Scrn, Ur POSITIVE (*)     All other components within normal limits    Narrative:     Performed at:  [address], [address]   Phone [phone]   SEDIMENTATION RATE - Abnormal; Notable for the following components:    Sed Rate 24 (*)     All other components within normal limits    Narrative:     Performed at:  [address], [address] [employer]   Phone [phone]   C-REACTIVE PROTEIN - Abnormal; Notable for the following components:    CRP 5.9 (*)     All other components within normal limits    Narrative:     Performed at:  [facility]  [address] [facility] [address]   Phone [phone]   CULTURE, BLOOD 1    Narrative:     ORDER#: [id number]                          ORDERED BY: Yanick Pereira  SOURCE: Blood Antecubital-Lef              COLLECTED:  11/27/21 21:55  ANTIBIOTICS AT [facility].:                      RECEIVED :  11/28/21 07:17  If child <=2 yrs old please draw pediatric bottle. ~Blood Culture 1  Performed at:  [facility]  [address], [address] [address]   Phone [phone]   CULTURE, BLOOD 2    Narrative:     ORDER#: [id number]                          ORDERED BY: Yanick Pereira  SOURCE: Blood No site given                COLLECTED:  11/27/21 21:59  ANTIBIOTICS AT [facility].:                      RECEIVED : Result   Unremarkable testicular ultrasound with normal Doppler flow. XR CHEST PORTABLE   Final Result   Suspect right basilar pneumonia. All other labs/imaging were within normal range or not returned as of this dictation. EMERGENCY DEPARTMENT COURSE and DIFFERENTIAL DIAGNOSIS/MDM:   Vitals:    Vitals:    11/28/21 1215 11/28/21 1619 11/28/21 2015 11/29/21 0000   BP: 131/81 121/73 134/86 118/85   Pulse: 79 63 67 57   Resp: 16 18 16 14   Temp: 97.9 °F (36.6 °C) 97.8 °F (36.6 °C) 98.2 °F (36.8 °C) 98.2 °F (36.8 °C)   TempSrc: Oral Oral Oral Oral   SpO2: 98% 98% 97% 97%   Weight:       Height:           Sinus tachycardia rate of 112 bpm.  No ST elevation. Patient presents emergency department today complaining of chest pain low back pain. Known history of discitis and bacteremia secondary to IV drug use. Noncompliant with outpatient long-term antibiotics. Here today found to have low-grade temperature elevation with tachycardia. Laboratory studies note mild leukocytosis with mildly elevated ESR and CRP. Chest x-ray consistent with pneumonia. Given his known underlying discitis started on vancomycin and Rocephin. Plain films of the lumbar spine show degenerative changes with no acute abnormality. He is having no new numbness, tingling, weakness incontinence and while he does have an underlying infection my overall suspicion for new epidural abscess is low. Did not feel emergent MRI necessary. Will be admitted for continued IV antibiotics    MDM    CONSULTS     IP CONSULT TO HOSPITALIST  PHARMACY TO DOSE VANCOMYCIN  IP CONSULT TO INFECTIOUS DISEASES  IP CONSULT TO PHARMACY  IP CONSULT TO SOCIAL WORK    Critical Care:   None    REASSESSMENT          PROCEDURE     Unless otherwise noted below, none     Procedures      FINAL IMPRESSION      1. IVDU (intravenous drug user)    2. Discitis of lumbar region    3.  Pneumonia due to infectious organism, unspecified laterality, unspecified part of lung    4. Chest pain, unspecified type            DISPOSITION/PLAN   DISPOSITION Admitted 11/28/2021 01:28:33 AM        PATIENT REFERRED TO:  No follow-up provider specified. DISCHARGE MEDICATIONS:  Current Discharge Medication List        Controlled Substances Monitoring:     No flowsheet data found.     (Please note that portions of this note were completed with a voice recognition program.  Efforts were made to edit the dictations but occasionally words are mis-transcribed.)    Royce Dance, MD (electronically signed)  Attending Emergency Physician            Terry Mendoza MD  11/29/21 8425

## 2021-11-28 NOTE — PROGRESS NOTES
11/28/21 1535 -Infectious disease consult perfect served and RNs number provided for callback.    -Sharif Matos, PCA

## 2021-11-28 NOTE — CONSULTS
Pharmacy Note  Vancomycin Consult    Jim Parisi is a 41 y.o. male started on Vancomycin for sepsis + discitis; consult received from Dr. Tomás Dowling to manage therapy. Allergies:  Patient has no known allergies. Tmax: 99 F    Recent Labs     11/27/21 2159   CREATININE 1.0       Recent Labs     11/27/21 2159   WBC 11.1*       Estimated Creatinine Clearance: 93 mL/min (based on SCr of 1 mg/dL). Intake/Output Summary (Last 24 hours) at 11/28/2021 1139  Last data filed at 11/28/2021 0041  Gross per 24 hour   Intake 1300 ml   Output    Net 1300 ml       Wt Readings from Last 1 Encounters:   11/27/21 150 lb (68 kg)         Body mass index is 22.15 kg/m². Culture Date      Source                       Results  11/27                  blood                         pending    Loading dose (critically ill or in ICU, require dialysis or renal replacement therapy): Vancomycin 25 mg/kg IVPB x 1 (maximum 3000 mg). Maintenance dose: 15 mg/kg (maximum: 2000 mg/dose and 4500 mg/day) starting at the next dosing interval determined by renal function  Pulse dose: fluctuating renal function, VAZQUEZ, ESRD   Goal Vancomycin trough: 10-15 mcg/mL or 15-20 mcg/mL   Goal Vancomycin AUC: 400-600     Assessment/Plan:  Patient received Vancomycin 1000 mg x1 as a one time loading dose in ED at 2341 on 11/27/21. Will initiate a maintenance dose of Vancomycin 750 mg IV every 12 hours. Calculated . Vancomycin trough ordered for 11/29 at 1100. Timing of trough level will be determined based on culture results, renal function, and clinical response. Thank you for the consult.     Katie Hardy PharmD 11/28/2021  11:47 AM     Vancomycin Day 2  Current Dosing: Vancomycin 750 mg q12h  Vancomycin trough = < 4.0 mcg/mL at 1122  Calc AUC < 400  Increase to Vancomycin 1250 mg q12h; Calc   Vancomycin level ordered for tomorrow w/ AM lupe Hardy PharmD 11/29/2021  1:28 PM      Vancomycin Day 3  Current Dosing: Vancomycin 1250 mg q12h  Vanc level = 10.4 mcg/mL at 1404  Calc   Continue current dose & monitor  Desiree Riley, PharmD 11/30/2021  2:54 PM

## 2021-11-28 NOTE — ED NOTES
Pt is now an ED HOLD.  VS changed to Q4 hours per policy. All orders released. No further medical needs at this time. Will continue to monitor.      Zenaida Fabian RN  11/28/21 8471

## 2021-11-28 NOTE — ED NOTES
Pt arrives via [facility] c/o CP x 2 days. Full dose 324mg Asa and one dose of SL Nitro given in route.      Omar Gandara RN  11/27/21 2124

## 2021-11-28 NOTE — CONSULTS
Infectious Diseases Inpatient Consult Note      Reason for Consult:  Concern for Lumbar diskitis     Requesting Physician:  Jimenez Raymundo MD     Primary Care Physician:  JORDY Bucio - CNP    History Obtained From:  [facility] AND Patient      CHIEF COMPLAINT:     Chief Complaint   Patient presents with    Chest Pain     Pt pulled over by police for warrants. Pt now c/o CP. Pt states hx MRSA in spine and heart. States CP x 2 days. HISTORY OF PRESENT ILLNESS:  41 y.o. man with h/o Lumbar diskitis and osteomyelitis dating back to MRI Lumbar spine Nov 2020, Jan 2021, Sept 2021 was seen by ID service in the past and MRSA on the abscess cx in the past and has h/o IVDA Amphetamine abuse now admitted with some worsening back pain and scrotal pain, swelling. He has multiple skin scabs with scars. Hep C+Ve not on therapy yet. Procal 0.15, Lft elevation noted, WBC 11.1, ESR 24. bLOOD cx in process and we are consulted for recommendations.       Past Medical History:    Past Medical History:   Diagnosis Date    Back pain     Cancer ([address]     Pancreatic cancer    Depression 12/09/2019    GERD (gastroesophageal reflux disease)     Hepatitis C     [facility]     MRSA (methicillin resistant staph aureus) culture positive 09/25/2020    L5 disc asp    Polysubstance abuse ([facility]     Wears glasses        Past Surgical History:    Past Surgical History:   Procedure Laterality Date    APPENDECTOMY      IR PERC ASPIRATION INTERVERT DISC  11/25/2020    IR PERC ASPIRATION INTERVERT [address] 11/25/2020 [facility] SPECIAL PROCEDURES    SHOULDER SURGERY Left 04/01/2014    UPPER GASTROINTESTINAL ENDOSCOPY  01/01/2014       Current Medications:    Outpatient Medications Marked as Taking for the 11/27/21 encounter [facility] Encounter)   Medication Sig Dispense Refill    mirtazapine (REMERON) 15 MG tablet Take by mouth      mupirocin (BACTROBAN) 2 % ointment Apply topically 3 times daily      traZODone (DESYREL) 100 MG tablet Take 100 mg by mouth nightly      lidocaine-prilocaine (EMLA) 2.5-2.5 % cream Apply to the most painful wounds once daily at time of dressing change, as needed for pain -- leave on for 20 minutes and then wipe off before applying antibiotic cream. 60 g 1    ibuprofen (ADVIL;MOTRIN) 800 MG tablet Take 800 mg by mouth every 8 hours as needed for Pain       melatonin 3 MG TABS tablet Take 1 tablet by mouth nightly 30 tablet 3    busPIRone (BUSPAR) 15 MG tablet Take 15 mg by mouth 3 times daily as needed Indications: Feeling Anxious      gabapentin (NEURONTIN) 600 MG tablet Take 600 mg by mouth 3 times daily.  buPROPion (WELLBUTRIN SR) 100 MG extended release tablet Take 100 mg by mouth 2 times daily      OLANZapine (ZYPREXA) 10 MG tablet Take 1 tablet by mouth nightly 30 tablet 0       Allergies:  Patient has no known allergies. Immunizations : There is no immunization history for the selected administration types on file for this patient.       Social History:   Social History     Tobacco Use    Smoking status: Current Every Day Smoker     Packs/day: 1.00     Types: Cigarettes    Smokeless tobacco: Never Used   Vaping Use    Vaping Use: Never used   Substance Use Topics    Alcohol use: Never    Drug use: Yes     Frequency: 7.0 times per week     Types: Marijuana (Merrilyn ), Methamphetamines (Crystal Meth), IV, Opiates      Social History     Tobacco Use   Smoking Status Current Every Day Smoker    Packs/day: 1.00    Types: Cigarettes   Smokeless Tobacco Never Used      Family History   Problem Relation Age of Onset    Cancer Mother     Heart Disease Father        REVIEW OF SYSTEMS:    Constitutional:  negative for fevers, chills, night sweats  Eyes:  negative for blurred vision, eye discharge, visual disturbance   HEENT:  negative for hearing loss, ear drainage,nasal congestion  Respiratory:  negative for cough, shortness of breath or hemoptysis   Cardiovascular:  negative for chest pain, palpitations, syncope  Gastrointestinal:  negative for nausea, vomiting, diarrhea, constipation, abdominal pain  Genitourinary:  negative for frequency, dysuria, urinary incontinence, hematuria  Hematologic/Lymphatic:  negative for easy bruising, bleeding and lymphadenopathy  Allergic/Immunologic:  negative for recurrent infections, angioedema, anaphylaxis   Endocrine:  negative for weight changes, polyuria, polydipsia and polyphagia  Musculoskeletal:  Back  Pain++, swelling, decreased range of motion  Integumentary: No rashes, skin lesions  Neurological:  negative for headaches, slurred speech, unilateral weakness  Psychiatric: negative for hallucinations,confusion,agitation.      PHYSICAL EXAM:      Vitals:    /60   Pulse 60   Temp 97.6 °F (36.4 °C) (Oral)   Resp 16   Ht 5' 9\" (1.753 m)   Wt 150 lb (68 kg)   SpO2 97%   BMI 22.15 kg/m²     General Appearance: alert,in no acute distress, + pallor, no icterus  Poor dentition+ skin changes with scabs and scarring +   Skin: warm and dry, no rash or erythema  Head: normocephalic and atraumatic  Eyes: pupils equal, round, and reactive to light, conjunctivae normal  ENT: tympanic membrane, external ear and ear canal normal bilaterally, nose without deformity, nasal mucosa and turbinates normal without polyps  Neck: supple and non-tender without mass, no thyromegaly  no cervical lymphadenopathy  Pulmonary/Chest: clear to auscultation bilaterally- no wheezes, rales or rhonchi, normal air movement, no respiratory distress  Cardiovascular: normal rate, regular rhythm, normal S1 and S2, no murmurs, rubs, clicks, or gallops, no carotid bruits  Abdomen: soft, non-tender, non-distended, normal bowel sounds, no masses or organomegaly  Extremities: no cyanosis, clubbing or edema  Musculoskeletal: normal range of motion, no joint swelling, deformity or tenderness  Integumentary: No rashes, no abnormal skin lesions, no petechiae  Neurologic: reflexes normal and symmetric, no cranial nerve deficit  Psych:  Orientation, sensorium, mood normal   Lines: IV    DATA:    CBC:   Lab Results   Component Value Date    WBC 11.1 (H) 11/27/2021    HGB 12.6 (L) 11/27/2021    HCT 37.5 (L) 11/27/2021    MCV 84.3 11/27/2021     11/27/2021     RENAL:   Lab Results   Component Value Date    CREATININE 1.0 11/27/2021    BUN 21 (H) 11/27/2021     11/27/2021    K 3.9 11/27/2021    CL 99 11/27/2021    CO2 26 11/27/2021     SED RATE:   Lab Results   Component Value Date    SEDRATE 24 11/27/2021     CK:   Lab Results   Component Value Date    YVOFVIL 430 12/05/2020     CRP:   Lab Results   Component Value Date    CRP 13.3 09/23/2021     Hepatic Function Panel:   Lab Results   Component Value Date    ALKPHOS 130 11/27/2021    ALT 59 11/27/2021    AST 41 11/27/2021    PROT 7.7 11/27/2021    PROT 8.8 12/20/2011    BILITOT 0.3 11/27/2021    BILIDIR <0.2 12/06/2020    IBILI see below 12/06/2020    LABALBU 3.8 11/27/2021     UA:  Lab Results   Component Value Date    COLORU Yellow 11/27/2021    CLARITYU Clear 11/27/2021    GLUCOSEU Negative 11/27/2021    BILIRUBINUR Negative 11/27/2021    KETUA Negative 11/27/2021    SPECGRAV 1.025 11/27/2021    BLOODU Negative 11/27/2021    PHUR 6.0 11/27/2021    PHUR 6.0 11/27/2021    PROTEINU Negative 11/27/2021    UROBILINOGEN 0.2 11/27/2021    NITRU Negative 11/27/2021    LEUKOCYTESUR Negative 11/27/2021    LABMICR Not Indicated 11/27/2021    URINETYPE NotGiven 11/27/2021      Urine Microscopic:   Lab Results   Component Value Date    WBCUA 0-2 03/21/2018    RBCUA 0-2 03/21/2018     Urine Reflex to Culture:   Lab Results   Component Value Date    URRFLXCULT Not Indicated 11/27/2021       [address]      Status: Final result    Component Ref Range & Units 11/27/21 2251   Amphetamine Screen, Urine Negative <1000ng/mL POSITIVE Abnormal     Comment: High concentrations of ephedrine/pseudoephedrine or   phenylpropanolamine may cause false positive results   for amphetamine. Therefore, confirmatory testing for   amphetamine should be considered if clinically indicated. Barbiturate Screen, Ur Negative <200 ng/mL Neg    Benzodiazepine Screen, Urine Negative <200 ng/mL Neg    Cannabinoid Scrn, Ur Negative <50 ng/mL POSITIVE Abnormal     Cocaine Metabolite Screen, Urine Negative <300 ng/mL Neg    Opiate Scrn, Ur Negative <300 ng/mL Neg    Comment: \"Therapeutic levels of pain medication, especially oxycontin and synthetic           MICRO: cultures reviewed and updated by me      Status: Final result     Visible to patient: No (not released)     Next appt: None     Dx: Hepatitis C virus infection without h. ..     3 Result Notes     Ref Range & Units 3/22/16 0846   Hep A IgM Non-reactive Non-reactive    Hep B Core Ab, IgM Non-reactive Non-reactive    Hep B S Ag Interp Non-reactive Non-reactive    Hep C Ab Interp Non-reactive REACTIVE Abnormal            Blood Culture:   Lab Results   Component Value Date    BC See additional report for complete BCID panel. 09/22/2021    [facility]  09/22/2021     POSITIVE for  This organism was isolated in one set. Susceptibility testing is not routinely done as this  organism frequently represents skin contamination. Additional testing can be ordered by calling the  Microbiology Department. BLOODCULT2 No Growth after 4 days of incubation. 09/23/2021       Viral Culture:    Lab Results   Component Value Date    COVID19 Not Detected 12/05/2020     Urine Culture: No results for input(s): Mo Brice in the last 72 hours.     Scheduled Meds:   nicotine  1 patch TransDERmal Once    gabapentin  600 mg Oral TID    mirtazapine  15 mg Oral Nightly    buPROPion  100 mg Oral BID    sodium chloride flush  5-40 mL IntraVENous 2 times per day    enoxaparin  40 mg SubCUTAneous Daily       Continuous Infusions:   sodium chloride         PRN Meds:  HYDROcodone 5 mg - acetaminophen, ketorolac, sodium chloride flush, sodium chloride, ondansetron **OR** ondansetron, polyethylene glycol, acetaminophen **OR** acetaminophen    Imaging:   XR LUMBAR SPINE (2-3 VIEWS)   Final Result   Grossly stable degenerative disc disease at L5-S1 with no acute abnormality   identified. US SCROTUM AND TESTICLES   Final Result   Unremarkable testicular ultrasound with normal Doppler flow. XR CHEST PORTABLE   Final Result   Suspect right basilar pneumonia. US DUP ABD PEL RETRO SCROT COMPLETE    (Results Pending)       MRI from 9/22/21    Impression   1. Persistent edema and enhancement in the L5-S1 endplates as well as minimal   anterior epidural enhancement in the area of known discitis.  The myositis   seen previously has resolved in the interval.   2. Small enhancing lesion in the right iliac bone is indeterminate, possibly   an atypical hemangioma. 3. Degenerative changes in the lower lumbar spine with mild spinal canal   stenosis at L3-4 and L4-5.  Lateral recess and neural foraminal stenosis as   above.           All pertinent images and reports for the current Hospitalization were reviewed by me.     IMPRESSION:    Patient Active Problem List   Diagnosis    Psychoactive substance-induced organic mood disorder ([address]    Ferguson's esophagus with dysplasia    Gastroesophageal reflux disease without esophagitis    Hepatitis C virus infection without hepatic coma    IVDU (intravenous drug user)    Polysubstance abuse ([address]    Depression    Osteomyelitis of vertebra, lumbosacral region ([address]    Tobacco abuse    Epidural abscess    Mild malnutrition ([address]    Discitis of lumbosacral region    Habitual self-excoriation    Multiple open wounds (arms, hands, left thigh, face, etc)    MRSA colonization    Discitis    Chronic bilateral low back pain without sciatica    Substance use disorder       H/o Lumbar diskitis from Nov 2020  Last MRI from Sept 2021 no fluid collection residual expected changes noted   ESR, CRP not much elevation IVDA  Amphetamine abuse  MRSA infection and abscess in the past   Hep C+Ve  Lft elevation from above    Clinical exam negative  FOR focal changes and bLOOd cx in process may need MRI of L spine depending on his symptoms            Labs, Microbiology, Radiology and pertinent results from current hospitalization and care every where were reviewed by me as a part of the consultation. PLAN :  1. Cont IV Vancomycin x 750 mg Q 12 hrs  2. MRSA probe  3. Clinical exam negative   4. Blood cx in process  5. Watch creat          Discussed with patient/Family and Nursing   Risk of Complications/Morbidity: High      · Illness(es)/ Infection present that pose threat to bodily function. · There is potential for severe exacerbation of infection/side effects of treatment. · Therapy requires intensive monitoring for antimicrobial agent toxicity. Thanks for allowing me to participate in your patient's care please call me with any questions or concerns.     Dr. Broderick Lott MD  [facility] Physician  Phone: [phone]   Fax : [phone]

## 2021-11-28 NOTE — ED NOTES
Pts pocket knife placed in labeled security bag and given to Luis A Scott from [employer].      Lowell Davila RN  11/28/21 8438

## 2021-11-28 NOTE — ED NOTES
[place]  OF [facility] left at this time. Pt is not a police hold.      Lowell Davila RN  11/27/21 5026

## 2021-11-29 ENCOUNTER — APPOINTMENT (OUTPATIENT)
Dept: MRI IMAGING | Age: 41
End: 2021-11-29
Payer: COMMERCIAL

## 2021-11-29 LAB
ANION GAP SERPL CALCULATED.3IONS-SCNC: 9 MMOL/L (ref 3–16)
BUN BLDV-MCNC: 18 MG/DL (ref 7–20)
CALCIUM SERPL-MCNC: 8.6 MG/DL (ref 8.3–10.6)
CHLORIDE BLD-SCNC: 106 MMOL/L (ref 99–110)
CO2: 20 MMOL/L (ref 21–32)
CREAT SERPL-MCNC: 0.9 MG/DL (ref 0.9–1.3)
GFR AFRICAN AMERICAN: >60
GFR NON-AFRICAN AMERICAN: >60
GLUCOSE BLD-MCNC: 93 MG/DL (ref 70–99)
HCT VFR BLD CALC: 44.6 % (ref 40.5–52.5)
HEMOGLOBIN: 14.7 G/DL (ref 13.5–17.5)
MCH RBC QN AUTO: 28.2 PG (ref 26–34)
MCHC RBC AUTO-ENTMCNC: 33 G/DL (ref 31–36)
MCV RBC AUTO: 85.5 FL (ref 80–100)
PDW BLD-RTO: 14.1 % (ref 12.4–15.4)
PLATELET # BLD: 293 K/UL (ref 135–450)
PMV BLD AUTO: 7.8 FL (ref 5–10.5)
POTASSIUM REFLEX MAGNESIUM: 4.2 MMOL/L (ref 3.5–5.1)
RBC # BLD: 5.21 M/UL (ref 4.2–5.9)
REASON FOR REJECTION: NORMAL
REJECTED TEST: NORMAL
SODIUM BLD-SCNC: 135 MMOL/L (ref 136–145)
VANCOMYCIN TROUGH: <4 UG/ML (ref 10–20)
WBC # BLD: 9.4 K/UL (ref 4–11)

## 2021-11-29 PROCEDURE — 87491 CHLMYD TRACH DNA AMP PROBE: CPT

## 2021-11-29 PROCEDURE — 6370000000 HC RX 637 (ALT 250 FOR IP): Performed by: INTERNAL MEDICINE

## 2021-11-29 PROCEDURE — 6360000002 HC RX W HCPCS: Performed by: INTERNAL MEDICINE

## 2021-11-29 PROCEDURE — 96372 THER/PROPH/DIAG INJ SC/IM: CPT

## 2021-11-29 PROCEDURE — A9579 GAD-BASE MR CONTRAST NOS,1ML: HCPCS | Performed by: INTERNAL MEDICINE

## 2021-11-29 PROCEDURE — G0378 HOSPITAL OBSERVATION PER HR: HCPCS

## 2021-11-29 PROCEDURE — 36415 COLL VENOUS BLD VENIPUNCTURE: CPT

## 2021-11-29 PROCEDURE — 99232 SBSQ HOSP IP/OBS MODERATE 35: CPT | Performed by: INTERNAL MEDICINE

## 2021-11-29 PROCEDURE — 96365 THER/PROPH/DIAG IV INF INIT: CPT

## 2021-11-29 PROCEDURE — 2580000003 HC RX 258: Performed by: INTERNAL MEDICINE

## 2021-11-29 PROCEDURE — 85027 COMPLETE CBC AUTOMATED: CPT

## 2021-11-29 PROCEDURE — 6360000004 HC RX CONTRAST MEDICATION: Performed by: INTERNAL MEDICINE

## 2021-11-29 PROCEDURE — 72158 MRI LUMBAR SPINE W/O & W/DYE: CPT

## 2021-11-29 PROCEDURE — 96366 THER/PROPH/DIAG IV INF ADDON: CPT

## 2021-11-29 PROCEDURE — 80202 ASSAY OF VANCOMYCIN: CPT

## 2021-11-29 PROCEDURE — 80048 BASIC METABOLIC PNL TOTAL CA: CPT

## 2021-11-29 PROCEDURE — 96376 TX/PRO/DX INJ SAME DRUG ADON: CPT

## 2021-11-29 PROCEDURE — 87591 N.GONORRHOEAE DNA AMP PROB: CPT

## 2021-11-29 RX ORDER — NICOTINE 21 MG/24HR
1 PATCH, TRANSDERMAL 24 HOURS TRANSDERMAL DAILY
Status: DISCONTINUED | OUTPATIENT
Start: 2021-11-29 | End: 2021-12-01 | Stop reason: HOSPADM

## 2021-11-29 RX ADMIN — ENOXAPARIN SODIUM 40 MG: 100 INJECTION SUBCUTANEOUS at 08:38

## 2021-11-29 RX ADMIN — OXYCODONE AND ACETAMINOPHEN 1 TABLET: 10; 325 TABLET ORAL at 08:37

## 2021-11-29 RX ADMIN — OXYCODONE AND ACETAMINOPHEN 1 TABLET: 10; 325 TABLET ORAL at 13:25

## 2021-11-29 RX ADMIN — VANCOMYCIN HYDROCHLORIDE 750 MG: 750 INJECTION, POWDER, LYOPHILIZED, FOR SOLUTION INTRAVENOUS at 01:04

## 2021-11-29 RX ADMIN — KETOROLAC TROMETHAMINE 30 MG: 30 INJECTION, SOLUTION INTRAMUSCULAR; INTRAVENOUS at 18:31

## 2021-11-29 RX ADMIN — GABAPENTIN 600 MG: 300 CAPSULE ORAL at 08:37

## 2021-11-29 RX ADMIN — VANCOMYCIN HYDROCHLORIDE 750 MG: 750 INJECTION, POWDER, LYOPHILIZED, FOR SOLUTION INTRAVENOUS at 13:31

## 2021-11-29 RX ADMIN — SODIUM CHLORIDE, PRESERVATIVE FREE 10 ML: 5 INJECTION INTRAVENOUS at 20:11

## 2021-11-29 RX ADMIN — SODIUM CHLORIDE 25 ML: 9 INJECTION, SOLUTION INTRAVENOUS at 13:29

## 2021-11-29 RX ADMIN — VANCOMYCIN HYDROCHLORIDE 500 MG: 500 INJECTION, POWDER, LYOPHILIZED, FOR SOLUTION INTRAVENOUS at 15:53

## 2021-11-29 RX ADMIN — OXYCODONE AND ACETAMINOPHEN 1 TABLET: 10; 325 TABLET ORAL at 18:31

## 2021-11-29 RX ADMIN — HYDROMORPHONE HYDROCHLORIDE 0.5 MG: 1 INJECTION, SOLUTION INTRAMUSCULAR; INTRAVENOUS; SUBCUTANEOUS at 09:45

## 2021-11-29 RX ADMIN — GADOTERIDOL 15 ML: 279.3 INJECTION, SOLUTION INTRAVENOUS at 18:01

## 2021-11-29 RX ADMIN — OXYCODONE AND ACETAMINOPHEN 1 TABLET: 10; 325 TABLET ORAL at 22:31

## 2021-11-29 RX ADMIN — HYDROMORPHONE HYDROCHLORIDE 0.5 MG: 1 INJECTION, SOLUTION INTRAMUSCULAR; INTRAVENOUS; SUBCUTANEOUS at 20:11

## 2021-11-29 RX ADMIN — MIRTAZAPINE 15 MG: 15 TABLET, FILM COATED ORAL at 20:11

## 2021-11-29 RX ADMIN — GABAPENTIN 600 MG: 300 CAPSULE ORAL at 20:11

## 2021-11-29 RX ADMIN — SODIUM CHLORIDE, PRESERVATIVE FREE 5 ML: 5 INJECTION INTRAVENOUS at 08:39

## 2021-11-29 RX ADMIN — SODIUM CHLORIDE 25 ML: 9 INJECTION, SOLUTION INTRAVENOUS at 01:03

## 2021-11-29 ASSESSMENT — PAIN SCALES - GENERAL
PAINLEVEL_OUTOF10: 10
PAINLEVEL_OUTOF10: 8
PAINLEVEL_OUTOF10: 0
PAINLEVEL_OUTOF10: 8
PAINLEVEL_OUTOF10: 7

## 2021-11-29 ASSESSMENT — PAIN DESCRIPTION - PAIN TYPE: TYPE: ACUTE PAIN

## 2021-11-29 ASSESSMENT — PAIN DESCRIPTION - LOCATION: LOCATION: BACK

## 2021-11-29 NOTE — PROGRESS NOTES
Physician Progress Note      Steve Childs  CSN #:                  [id number]  :                       1980  ADMIT DATE:       2021 9:13 PM  [address] [place] DATE:  RESPONDING  PROVIDER #:        Dmitriy Anderson MD          QUERY TEXT:    Patient presented to ER w/ c/o chest pain. ER provider note dx- pneumonia. If   possible, please document in the progress notes and discharge summary if   pneumonia was: The medical record reflects the following:  Risk Factors: IVDU with recent hospitalizations and IV abx  Clinical Indicators: CXR : \"Suspect right basilar pneumonia. \"  ER provider note:  \" Chest x-ray consistent with pneumonia. \"  Treatment: IV antibiotics, imaging, labs, supportive care and monitoring    Thank you,  Tim Murillo RN CDS  [phone]  Options provided:  -- Pneumonia confirmed after study  -- Pneumonia ruled out after study  -- Other - I will add my own diagnosis  -- Disagree - Not applicable / Not valid  -- Disagree - Clinically unable to determine / Unknown  -- Refer to Clinical Documentation Reviewer    PROVIDER RESPONSE TEXT:    Pneumonia ruled out after study. Query created by:  El Nevarez on 2021 3:25 PM      Electronically signed by:  Dmitriy Anderson MD 2021 4:43 PM

## 2021-11-29 NOTE — PROGRESS NOTES
Infectious Disease Follow up Notes    CC :  IVDU, possible lumbar discitis      Antibiotics:   vanc 750 q12    Admit Date:   11/27/2021  Hospital Day: 3    Subjective:   He remains AF on RA   Continued pain low back radiating cranially. Reports painful urination, difficulty initiating stream.  No hematuria or dysuria. No incontinence. No LE weakness. Objective:     Patient Vitals for the past 8 hrs:   BP Temp Temp src Pulse Resp SpO2   11/29/21 0800 133/87 98.1 °F (36.7 °C) Oral 73 14 99 %       EXAM:  General:  Alert, oriented, cooperative, NAD    HEENT:   NCAT, PERRL, sclera anicteric  NECK:   Supple, symmetrical   LUNGS:   CTA sehlia without W/R/R  CV:   RRR without murmur   ABD:    Soft, flat, NT   EXT:  No focal rash    Tenderness low spine      LINE:  IV site ok       Scheduled Meds:   gabapentin  600 mg Oral TID    mirtazapine  15 mg Oral Nightly    sodium chloride flush  5-40 mL IntraVENous 2 times per day    enoxaparin  40 mg SubCUTAneous Daily    vancomycin  750 mg IntraVENous Q12H    buPROPion  100 mg Oral Nightly       Continuous Infusions:   sodium chloride 25 mL (11/29/21 0103)          Data Review:    Lab Results   Component Value Date    WBC 9.4 11/29/2021    HGB 14.7 11/29/2021    HCT 44.6 11/29/2021    MCV 85.5 11/29/2021     11/29/2021     Lab Results   Component Value Date    CREATININE 0.9 11/29/2021    BUN 18 11/29/2021     (L) 11/29/2021    K 4.2 11/29/2021     11/29/2021    CO2 20 (L) 11/29/2021       Hepatic Function Panel:   Lab Results   Component Value Date    ALKPHOS 130 11/27/2021    ALT 59 11/27/2021    AST 41 11/27/2021    PROT 7.7 11/27/2021    PROT 8.8 12/20/2011    BILITOT 0.3 11/27/2021    BILIDIR <0.2 12/06/2020    IBILI see below 12/06/2020    LABALBU 3.8 11/27/2021         MICRO:  11/27 BC x2 NGTD   UA neg       IMAGING:  MRI L spine 9/22/21  Impression   1. Persistent edema and enhancement in the L5-S1 endplates as well as minimal   anterior epidural enhancement in the area of known discitis.  The myositis   seen previously has resolved in the interval.   2. Small enhancing lesion in the right iliac bone is indeterminate, possibly   an atypical hemangioma. 3. Degenerative changes in the lower lumbar spine with mild spinal canal   stenosis at L3-4 and L4-5.  Lateral recess and neural foraminal stenosis as   above. MRI T spine 9/22/21 - negative       Assessment:     Patient Active Problem List    Diagnosis Date Noted    Chronic bilateral low back pain without sciatica 09/23/2021    Substance use disorder 09/23/2021    Discitis 09/22/2021    Habitual self-excoriation 01/10/2021    Multiple open wounds (arms, hands, left thigh, face, etc) 01/10/2021    MRSA colonization 01/10/2021    Mild malnutrition ([address] 11/25/2020    Discitis of lumbosacral region 11/25/2020    Epidural abscess 11/24/2020    Osteomyelitis of vertebra, lumbosacral region ([address] 09/23/2020    Tobacco abuse 09/23/2020    Depression 12/09/2019    IVDU (intravenous drug user) 12/05/2019    Polysubstance abuse ([address]     Hepatitis C virus infection without hepatic coma 03/25/2016    Ferguson's esophagus with dysplasia 03/22/2016    Gastroesophageal reflux disease without esophagitis 03/22/2016    Psychoactive substance-induced organic mood disorder ([address] 12/21/2011       History of IVDU    History of lumbosacral discitis with SEA initially diagnosed 9/2020. Presumed etiologic agent MRSA as evidenced in positive BC with MRSA 10/2020. Treatment was fragmented and incomplete. He was lost to ID follow-up     Admitted [facility] 9/2021 with clinical and MRI evidence of persistent spinal infection. Plan was 8 weeks vanc/ceftriaxone (ending 11/17/21) but he left AMA     Readmitted 11/27/21 with continued low back pain.   Concern for persistent spinal infection  ESR, CRP are not elevated, though neither was elevated at time of diagnosis 9/2020  -repeat MRI L spine   -continue empiric IV vanc for now     ? Epididymo-orchitis   US and UA were negative  -Check urine GC/CT for completeness   -update HIV screen, last was 14 mos ago      NKDA         Discussed with patient/family, all questions answered        Denise Zapien MD  Phone: [phone]   Fax : [phone]

## 2021-11-29 NOTE — CARE COORDINATION
CASE MANAGEMENT INITIAL ASSESSMENT      Reviewed chart and completed assessment with patient at bedside    Explained Case Management role/services   Primary contact information:as below    Health Care Decision Maker :   Primary Decision Maker: Aislinn Adams - Spouse - [phone]          Can this person be reached and be able to respond quickly, such as within a few minutes or hours? Yes       Admit date/status:11/27/21  IPA  Diagnosis: discitis    Is this a Readmission?:  No      Insurance:[facility]    Precert required for SNF: Yes       3 night stay required: No    Living arrangements, Adls, care needs, prior to admission: States IPTA:Admitted IVDU - last used \"2 weeks ago\". Initially diagnosed with discitis 9/20/21 Positive BC MRSA - did not follow up with ID. Admitted to [facility] 9/2/21 persistent spinal infection. Plan was 8 wks IVABx. Dc'd to [facility] - signed out [place]. Does not want to return there. Said he'd like to consider [facility] of [address] [place] [address] or [address] Transportation:self    Durable Medical Equipment at home:  Walker__Cane__RTS__ BSC__Shower Chair__  02__ HHN__ CPAP__  BiPap__  Hospital Bed__ W/C___ Other__none________    Services in the home and/or outpatient, prior to [address] (if applicable)   · Name:  · Address:  · Dialysis Schedule:  · Phone:  · Fax:    PT/OT recs:not ordered  Hospital Exemption Notification (HEN):needed if SNF    Barriers to discharge:none    Plan/comments: Will likely need SNF for IVABx . Prefers [facility] or [facility] of [address]. Patient accepted resource substance abuse packet.     [facility] on chart for MD dipesh Turner RN

## 2021-11-29 NOTE — PROGRESS NOTES
Message sent to [facility] LEO GONZALEZ \"Pt here for discitis. HX IVDA, only positive for amphetamines on admit. VSS. Pt pain unbearable, has had Toradol and Norco. Is there anything else we can try? Pt also requests something for sleep and is incredibly anxious. \" Waiting for response.

## 2021-11-29 NOTE — PROGRESS NOTES
Perfect serve sent to Dima Nice MD:    11/29/21 11:39 AM   [phone] Hospital or Facility: [facility] From: Jasbir Wick RE: Mariela Alvarez 1980 RM: 5    Pt requesting nicotine patch, it was ordered last night but order was only placed for one time dose. Thanks!      Need Callback: NO CALLBACK REQ C5 MED SURG / Danny Capps

## 2021-11-29 NOTE — PLAN OF CARE
Pt A&Ox4 lying in bed. Pt orientated to room and call light. Bed is in lowest position with wheels locked. 2/4 siderails raised. Non-slip socks are on. Room is well lit. Pt educated bed alarm and Stay with me. Pt refuses both. Call light within reach, will continue to monitor.

## 2021-11-29 NOTE — PROGRESS NOTES
Hospitalist Progress Note      PCP: JORDY Troncoso - CNP    Date of Admission: 11/27/2021    Chief Complaint: Back Pain    Subjective: No new c/o. Medications:  Reviewed    Infusion Medications    sodium chloride 25 mL (11/29/21 0103)     Scheduled Medications    gabapentin  600 mg Oral TID    mirtazapine  15 mg Oral Nightly    sodium chloride flush  5-40 mL IntraVENous 2 times per day    enoxaparin  40 mg SubCUTAneous Daily    vancomycin  750 mg IntraVENous Q12H    buPROPion  100 mg Oral Nightly     PRN Meds: ketorolac, sodium chloride flush, sodium chloride, ondansetron **OR** ondansetron, polyethylene glycol, acetaminophen **OR** acetaminophen, oxyCODONE-acetaminophen, HYDROmorphone      Intake/Output Summary (Last 24 hours) at 11/29/2021 0850  Last data filed at 11/29/2021 0527  Gross per 24 hour   Intake 1090 ml   Output 1050 ml   Net 40 ml       Physical Exam Performed:    /87   Pulse 73   Temp 98.1 °F (36.7 °C) (Oral)   Resp 14   Ht 5' 9\" (1.753 m)   Wt 150 lb (68 kg)   SpO2 99%   BMI 22.15 kg/m²     General appearance: No apparent distress, appears stated age and cooperative. HEENT: Pupils equal, round, and reactive to light. Conjunctivae/corneas clear. Neck: Supple, with full range of motion. No jugular venous distention. Trachea midline. Respiratory:  Normal respiratory effort. Clear to auscultation, bilaterally without Rales/Wheezes/Rhonchi. Cardiovascular: Regular rate and rhythm with normal S1/S2 without murmurs, rubs or gallops. Abdomen: Soft, non-tender, non-distended with normal bowel sounds. Musculoskeletal: No clubbing, cyanosis or edema bilaterally. Full range of motion without deformity. Skin: Skin color, texture, turgor normal.  No rashes or lesions. Neurologic:  Neurovascularly intact without any focal sensory/motor deficits.  Cranial nerves: II-XII intact, grossly non-focal.  Psychiatric: Alert and oriented, thought content appropriate, normal insight  Capillary Refill: Brisk,< 3 seconds   Peripheral Pulses: +2 palpable, equal bilaterally       Labs:   Recent Labs     11/27/21 2159   WBC 11.1*   HGB 12.6*   HCT 37.5*        Recent Labs     11/27/21 2159 11/29/21  0642    135*   K 3.9 4.2   CL 99 106   CO2 26 20*   BUN 21* 18   CREATININE 1.0 0.9   CALCIUM 8.9 8.6     Recent Labs     11/27/21 2159   AST 41*   ALT 59*   BILITOT 0.3   ALKPHOS 130*     Recent Labs     11/27/21 2159   INR 1.00     Recent Labs     11/27/21 2159   TROPONINI <0.01       Urinalysis:      Lab Results   Component Value Date    NITRU Negative 11/27/2021    WBCUA 0-2 03/21/2018    RBCUA 0-2 03/21/2018    BLOODU Negative 11/27/2021    SPECGRAV 1.025 11/27/2021    GLUCOSEU Negative 11/27/2021       Consults:    IP CONSULT TO HOSPITALIST  PHARMACY TO DOSE VANCOMYCIN  IP CONSULT TO INFECTIOUS DISEASES  IP CONSULT TO PHARMACY  IP CONSULT TO SOCIAL WORK      Assessment/Plan:    Active Hospital Problems    Diagnosis     Discitis [M46.40]     IVDU (intravenous drug user) [F19.90]        Sepsis 2/2 lumbar discitis  - presented with tachycardia, leukocytosis. Lactate WNL  - infection likely due to IVDU  - cultures in past positive for MRSA  - Infectious Disease consulted and appreciated. - started on IV Vancomycin  - blood cultures ordered  - pain control ordered     Amphetamine abuse, IVDU  - tox screen positive for amphetamines  - counseling given     Anxiety/depression  - mood stable  - continue home wellbutrin, remeron       DVT Prophylaxis: LMWH     Recent Labs     11/27/21 2159        Diet: ADULT DIET; Regular  Code Status: Full Code      PT/OT Eval Status: not yet ordered. Dispo - Possibly Tues/Wed 27 Nov/1 Dec pending clinical course and subspecialty recs.       Samuel Lamas MD [phone]

## 2021-11-30 LAB
ALBUMIN SERPL-MCNC: 3.3 G/DL (ref 3.4–5)
ANION GAP SERPL CALCULATED.3IONS-SCNC: 11 MMOL/L (ref 3–16)
BUN BLDV-MCNC: 20 MG/DL (ref 7–20)
C. TRACHOMATIS DNA ,URINE: NEGATIVE
CALCIUM SERPL-MCNC: 9.1 MG/DL (ref 8.3–10.6)
CHLORIDE BLD-SCNC: 106 MMOL/L (ref 99–110)
CO2: 20 MMOL/L (ref 21–32)
CREAT SERPL-MCNC: 1.1 MG/DL (ref 0.9–1.3)
GFR AFRICAN AMERICAN: >60
GFR NON-AFRICAN AMERICAN: >60
GLUCOSE BLD-MCNC: 91 MG/DL (ref 70–99)
HIV AG/AB: NORMAL
HIV ANTIGEN: NORMAL
HIV-1 ANTIBODY: NORMAL
HIV-2 AB: NORMAL
N. GONORRHOEAE DNA, URINE: NEGATIVE
PHOSPHORUS: 3.8 MG/DL (ref 2.5–4.9)
POTASSIUM SERPL-SCNC: 4.7 MMOL/L (ref 3.5–5.1)
REASON FOR REJECTION: NORMAL
REJECTED TEST: NORMAL
SODIUM BLD-SCNC: 137 MMOL/L (ref 136–145)
VANCOMYCIN RANDOM: 29.6 UG/ML
VANCOMYCIN TROUGH: 10.4 UG/ML (ref 10–20)

## 2021-11-30 PROCEDURE — 6360000002 HC RX W HCPCS: Performed by: INTERNAL MEDICINE

## 2021-11-30 PROCEDURE — 6370000000 HC RX 637 (ALT 250 FOR IP): Performed by: INTERNAL MEDICINE

## 2021-11-30 PROCEDURE — 87390 HIV-1 AG IA: CPT

## 2021-11-30 PROCEDURE — 99232 SBSQ HOSP IP/OBS MODERATE 35: CPT | Performed by: INTERNAL MEDICINE

## 2021-11-30 PROCEDURE — 86701 HIV-1ANTIBODY: CPT

## 2021-11-30 PROCEDURE — 96372 THER/PROPH/DIAG INJ SC/IM: CPT

## 2021-11-30 PROCEDURE — 80069 RENAL FUNCTION PANEL: CPT

## 2021-11-30 PROCEDURE — 96376 TX/PRO/DX INJ SAME DRUG ADON: CPT

## 2021-11-30 PROCEDURE — 96375 TX/PRO/DX INJ NEW DRUG ADDON: CPT

## 2021-11-30 PROCEDURE — 36415 COLL VENOUS BLD VENIPUNCTURE: CPT

## 2021-11-30 PROCEDURE — 86702 HIV-2 ANTIBODY: CPT

## 2021-11-30 PROCEDURE — 2580000003 HC RX 258: Performed by: INTERNAL MEDICINE

## 2021-11-30 PROCEDURE — G0378 HOSPITAL OBSERVATION PER HR: HCPCS

## 2021-11-30 PROCEDURE — 96366 THER/PROPH/DIAG IV INF ADDON: CPT

## 2021-11-30 PROCEDURE — 80202 ASSAY OF VANCOMYCIN: CPT

## 2021-11-30 RX ORDER — MORPHINE SULFATE 2 MG/ML
2 INJECTION, SOLUTION INTRAMUSCULAR; INTRAVENOUS EVERY 4 HOURS PRN
Status: DISCONTINUED | OUTPATIENT
Start: 2021-11-30 | End: 2021-12-01 | Stop reason: HOSPADM

## 2021-11-30 RX ADMIN — GABAPENTIN 600 MG: 300 CAPSULE ORAL at 20:18

## 2021-11-30 RX ADMIN — HYDROMORPHONE HYDROCHLORIDE 0.5 MG: 1 INJECTION, SOLUTION INTRAMUSCULAR; INTRAVENOUS; SUBCUTANEOUS at 05:30

## 2021-11-30 RX ADMIN — OXYCODONE AND ACETAMINOPHEN 1 TABLET: 10; 325 TABLET ORAL at 03:27

## 2021-11-30 RX ADMIN — GABAPENTIN 600 MG: 300 CAPSULE ORAL at 15:18

## 2021-11-30 RX ADMIN — SODIUM CHLORIDE 25 ML: 9 INJECTION, SOLUTION INTRAVENOUS at 03:25

## 2021-11-30 RX ADMIN — HYDROMORPHONE HYDROCHLORIDE 0.5 MG: 1 INJECTION, SOLUTION INTRAMUSCULAR; INTRAVENOUS; SUBCUTANEOUS at 09:50

## 2021-11-30 RX ADMIN — GABAPENTIN 600 MG: 300 CAPSULE ORAL at 08:33

## 2021-11-30 RX ADMIN — MORPHINE SULFATE 2 MG: 2 INJECTION, SOLUTION INTRAMUSCULAR; INTRAVENOUS at 22:30

## 2021-11-30 RX ADMIN — VANCOMYCIN HYDROCHLORIDE 1250 MG: 10 INJECTION, POWDER, LYOPHILIZED, FOR SOLUTION INTRAVENOUS at 15:25

## 2021-11-30 RX ADMIN — KETOROLAC TROMETHAMINE 30 MG: 30 INJECTION, SOLUTION INTRAMUSCULAR; INTRAVENOUS at 10:53

## 2021-11-30 RX ADMIN — Medication 10 ML: at 22:31

## 2021-11-30 RX ADMIN — VANCOMYCIN HYDROCHLORIDE 1250 MG: 10 INJECTION, POWDER, LYOPHILIZED, FOR SOLUTION INTRAVENOUS at 03:27

## 2021-11-30 RX ADMIN — OXYCODONE AND ACETAMINOPHEN 1 TABLET: 10; 325 TABLET ORAL at 08:33

## 2021-11-30 RX ADMIN — MORPHINE SULFATE 2 MG: 2 INJECTION, SOLUTION INTRAMUSCULAR; INTRAVENOUS at 12:20

## 2021-11-30 RX ADMIN — Medication 10 ML: at 05:30

## 2021-11-30 RX ADMIN — SODIUM CHLORIDE, PRESERVATIVE FREE 10 ML: 5 INJECTION INTRAVENOUS at 08:34

## 2021-11-30 RX ADMIN — SODIUM CHLORIDE, PRESERVATIVE FREE 10 ML: 5 INJECTION INTRAVENOUS at 20:19

## 2021-11-30 RX ADMIN — HYDROMORPHONE HYDROCHLORIDE 0.5 MG: 1 INJECTION, SOLUTION INTRAMUSCULAR; INTRAVENOUS; SUBCUTANEOUS at 00:05

## 2021-11-30 RX ADMIN — MIRTAZAPINE 15 MG: 15 TABLET, FILM COATED ORAL at 20:18

## 2021-11-30 RX ADMIN — ENOXAPARIN SODIUM 40 MG: 100 INJECTION SUBCUTANEOUS at 08:33

## 2021-11-30 RX ADMIN — MORPHINE SULFATE 2 MG: 2 INJECTION, SOLUTION INTRAMUSCULAR; INTRAVENOUS at 18:37

## 2021-11-30 RX ADMIN — OXYCODONE AND ACETAMINOPHEN 1 TABLET: 10; 325 TABLET ORAL at 20:18

## 2021-11-30 RX ADMIN — KETOROLAC TROMETHAMINE 30 MG: 30 INJECTION, SOLUTION INTRAMUSCULAR; INTRAVENOUS at 22:30

## 2021-11-30 RX ADMIN — Medication 10 ML: at 00:05

## 2021-11-30 RX ADMIN — OXYCODONE AND ACETAMINOPHEN 1 TABLET: 10; 325 TABLET ORAL at 15:18

## 2021-11-30 ASSESSMENT — PAIN SCALES - GENERAL
PAINLEVEL_OUTOF10: 8
PAINLEVEL_OUTOF10: 6
PAINLEVEL_OUTOF10: 8
PAINLEVEL_OUTOF10: 7
PAINLEVEL_OUTOF10: 7
PAINLEVEL_OUTOF10: 8
PAINLEVEL_OUTOF10: 7
PAINLEVEL_OUTOF10: 8
PAINLEVEL_OUTOF10: 8
PAINLEVEL_OUTOF10: 7
PAINLEVEL_OUTOF10: 7

## 2021-11-30 ASSESSMENT — PAIN DESCRIPTION - ORIENTATION: ORIENTATION: LOWER

## 2021-11-30 ASSESSMENT — PAIN DESCRIPTION - LOCATION
LOCATION: BACK
LOCATION: BACK

## 2021-11-30 ASSESSMENT — PAIN DESCRIPTION - PAIN TYPE: TYPE: ACUTE PAIN

## 2021-11-30 NOTE — PROGRESS NOTES
Infectious Disease Follow up Notes    CC :  IVDU, possible lumbar discitis      Antibiotics:   vanc 1250 q12    Admit Date:   11/27/2021  Hospital Day: 4    Subjective:   He remains AF on RA   No significant interval changes     Objective:     Patient Vitals for the past 8 hrs:   BP Temp Temp src Pulse Resp SpO2   11/30/21 1437 (!) 145/93 98.3 °F (36.8 °C) Oral 94 16 100 %   11/30/21 1134 (!) 150/51 98.7 °F (37.1 °C) Oral 89 16 99 %       EXAM:  General:  Alert, oriented, cooperative, NAD    HEENT:   NCAT, PERRL, sclera anicteric  NECK:   Supple, symmetrical   ABD:    Soft, flat, NT   EXT:  No focal rash      LINE:  IV site ok       Scheduled Meds:   nicotine  1 patch TransDERmal Daily    vancomycin  1,250 mg IntraVENous Q12H    gabapentin  600 mg Oral TID    mirtazapine  15 mg Oral Nightly    sodium chloride flush  5-40 mL IntraVENous 2 times per day    enoxaparin  40 mg SubCUTAneous Daily    buPROPion  100 mg Oral Nightly       Continuous Infusions:   sodium chloride 25 mL (11/30/21 0325)          Data Review:    Lab Results   Component Value Date    WBC 9.4 11/29/2021    HGB 14.7 11/29/2021    HCT 44.6 11/29/2021    MCV 85.5 11/29/2021     11/29/2021     Lab Results   Component Value Date    CREATININE 1.1 11/30/2021    BUN 20 11/30/2021     11/30/2021    K 4.7 11/30/2021     11/30/2021    CO2 20 (L) 11/30/2021       Hepatic Function Panel:   Lab Results   Component Value Date    ALKPHOS 130 11/27/2021    ALT 59 11/27/2021    AST 41 11/27/2021    PROT 7.7 11/27/2021    PROT 8.8 12/20/2011    BILITOT 0.3 11/27/2021    BILIDIR <0.2 12/06/2020    IBILI see below 12/06/2020    LABALBU 3.3 11/30/2021         MICRO:  11/27 BC x2 NGTD   UA neg       IMAGING:  MRI L spine 9/22/21  Impression   1.  Persistent edema and enhancement in the L5-S1 endplates as well as minimal   anterior epidural enhancement in the area of known discitis.  The myositis   seen previously has resolved in the interval.   2. Small enhancing lesion in the right iliac bone is indeterminate, possibly   an atypical hemangioma. 3. Degenerative changes in the lower lumbar spine with mild spinal canal   stenosis at L3-4 and L4-5.  Lateral recess and neural foraminal stenosis as   above. MRI T spine 9/22/21 - negative     MRI L spine 11/29/21  Impression   There is mild enhancement of the L5-S1 disc, and mild marrow edema in the L5   inferior endplate, without edema in the S1 superior endplate.  These findings   are favored to represent degenerative disc disease, however mild/early   discitis is not excluded.  No evidence of epidural abscess.       Degenerative changes result in minimal spinal stenosis at L3-4 and mild left   neural foraminal stenosis at L5-S1.  No high-grade spinal or neural foraminal   stenosis is evident. Assessment:     Patient Active Problem List    Diagnosis Date Noted    Chronic bilateral low back pain without sciatica 09/23/2021    Substance use disorder 09/23/2021    Discitis 09/22/2021    Habitual self-excoriation 01/10/2021    Multiple open wounds (arms, hands, left thigh, face, etc) 01/10/2021    MRSA colonization 01/10/2021    Mild malnutrition ([address] 11/25/2020    Discitis of lumbosacral region 11/25/2020    Epidural abscess 11/24/2020    Osteomyelitis of vertebra, lumbosacral region ([address] 09/23/2020    Tobacco abuse 09/23/2020    Depression 12/09/2019    IVDU (intravenous drug user) 12/05/2019    Polysubstance abuse ([address]     Hepatitis C virus infection without hepatic coma 03/25/2016    Ferguson's esophagus with dysplasia 03/22/2016    Gastroesophageal reflux disease without esophagitis 03/22/2016    Psychoactive substance-induced organic mood disorder ([address] 12/21/2011       History of IVDU    History of lumbosacral discitis with SEA initially diagnosed 9/2020.   Presumed etiologic agent MRSA as evidenced in positive BC with MRSA 10/2020. Treatment was fragmented and incomplete. He was lost to ID follow-up     Admitted [facility] 9/2021 with clinical and MRI evidence of persistent spinal infection. Plan was 8 weeks vanc/ceftriaxone (ending 11/17/21) but he left AMA     Readmitted 11/27/21 with continued low back pain and concern for persistent spinal infection  ESR, CRP are not elevated (though neither was elevated at time of diagnosis 9/2020)  Repeat MRI L spine is reassuring. In the absence of ongoing abx treatment, would expect progression of disease on MRI if he had active infection.  -at this point, lacking convincing evidence of active infection, will DC abx   -he is encouraged to follow-up or call if the situation changes    ? Epididymo-orchitis   US and UA were negative  Urrine GC/CT is negative     Updated HIV screen pending     NKDA         Discussed with patient/family, all questions answered  Ok for DC    D/w AMAIRANI Astorga MD  Phone: [phone]   Fax : [phone]

## 2021-11-30 NOTE — CARE COORDINATION
CM met with pt at bedside to discuss DCP and if he would be in agreement with SNF for IV ABX with HX of IDU. Pt left SNF only a few days ago AMA for same dx. Pt very pleasant and states that if he can get somewhere close to home he would go to SNF and finish course of IV ABX. Pt wanting referral to [facility] [address], referral faxed on this day. CM following-Rosalind Talley RN     ADDENDUM 1428: LVM with Suri in admissions with [facility] [address].  Nicole Ashford RN

## 2021-11-30 NOTE — PROGRESS NOTES
Hospitalist Progress Note      PCP: Jeff Love, APRN - CNP    Date of Admission: 11/27/2021    Chief Complaint: Back Pain    Subjective: No new c/o. Medications:  Reviewed    Infusion Medications    sodium chloride 25 mL (11/30/21 0325)     Scheduled Medications    nicotine  1 patch TransDERmal Daily    vancomycin  1,250 mg IntraVENous Q12H    gabapentin  600 mg Oral TID    mirtazapine  15 mg Oral Nightly    sodium chloride flush  5-40 mL IntraVENous 2 times per day    enoxaparin  40 mg SubCUTAneous Daily    buPROPion  100 mg Oral Nightly     PRN Meds: ketorolac, sodium chloride flush, sodium chloride, ondansetron **OR** ondansetron, polyethylene glycol, acetaminophen **OR** acetaminophen, oxyCODONE-acetaminophen, HYDROmorphone      Intake/Output Summary (Last 24 hours) at 11/30/2021 0852  Last data filed at 11/29/2021 0957  Gross per 24 hour   Intake 240 ml   Output    Net 240 ml       Physical Exam Performed:    /83   Pulse 82   Temp 98.2 °F (36.8 °C) (Oral)   Resp 16   Ht 5' 9\" (1.753 m)   Wt 150 lb (68 kg)   SpO2 98%   BMI 22.15 kg/m²     General appearance: No apparent distress, appears stated age and cooperative. HEENT: Pupils equal, round, and reactive to light. Conjunctivae/corneas clear. Neck: Supple, with full range of motion. No jugular venous distention. Trachea midline. Respiratory:  Normal respiratory effort. Clear to auscultation, bilaterally without Rales/Wheezes/Rhonchi. Cardiovascular: Regular rate and rhythm with normal S1/S2 without murmurs, rubs or gallops. Abdomen: Soft, non-tender, non-distended with normal bowel sounds. Musculoskeletal: No clubbing, cyanosis or edema bilaterally. Full range of motion without deformity. Skin: Skin color, texture, turgor normal.  No rashes or lesions. Neurologic:  Neurovascularly intact without any focal sensory/motor deficits.  Cranial nerves: II-XII intact, grossly non-focal.  Psychiatric: Alert and oriented, thought content appropriate, normal insight  Capillary Refill: Brisk,< 3 seconds   Peripheral Pulses: +2 palpable, equal bilaterally       Labs:   Recent Labs     11/27/21 2159 11/29/21  0928   WBC 11.1* 9.4   HGB 12.6* 14.7   HCT 37.5* 44.6    293     Recent Labs     11/27/21 2159 11/29/21  0642 11/30/21  0523    135* 137   K 3.9 4.2 4.7   CL 99 106 106   CO2 26 20* 20*   BUN 21* 18 20   CREATININE 1.0 0.9 1.1   CALCIUM 8.9 8.6 9.1   PHOS  --   --  3.8     Recent Labs     11/27/21 2159   AST 41*   ALT 59*   BILITOT 0.3   ALKPHOS 130*     Recent Labs     11/27/21 2159   INR 1.00     Recent Labs     11/27/21 2159   TROPONINI <0.01       Urinalysis:      Lab Results   Component Value Date    NITRU Negative 11/27/2021    WBCUA 0-2 03/21/2018    RBCUA 0-2 03/21/2018    BLOODU Negative 11/27/2021    SPECGRAV 1.025 11/27/2021    GLUCOSEU Negative 11/27/2021       Consults:    IP CONSULT TO HOSPITALIST  PHARMACY TO DOSE VANCOMYCIN  IP CONSULT TO INFECTIOUS DISEASES  IP CONSULT TO PHARMACY  IP CONSULT TO SOCIAL WORK      Assessment/Plan:    Active Hospital Problems    Diagnosis     Discitis [M46.40]     IVDU (intravenous drug user) [F19.90]        Discitis - Previously diagnosed and did not complete treatment. Started on IV Vancomycin w/ past Cx demonstrating MRSA - likely 2nd to IVDU. Follow serial labs w/out evidence of ABX induced nephrotoxicity. Infectious Disease consulted and appreciated. MRI 29 Nov w/out abscess but w/ changes certainly c/w known hx of discitis. Continue PO/IV narcotic analgesia as ordered. Sepsis - w/ Leukocytosis/Tachycardia POArrival 2nd to above infection.   Continue IVF as appropriate and monitor clinical response w/ ABX as written.      Amphetamine abuse, IVDU  - tox screen positive for amphetamines  - counseling given     Anxiety/depression - controlled on home Wellbutrin/Remeron - continued.        DVT Prophylaxis: LMWH     Recent Labs     11/27/21 2159 11/29/21  0928    293     Diet: ADULT DIET; Regular  Code Status: Full Code      PT/OT Eval Status: not yet ordered. Dispo - Possibly Tues/Wed 27 Nov/1 Dec pending clinical course and subspecialty recs.       Jessica Duarte MD

## 2021-12-01 VITALS
HEIGHT: 69 IN | RESPIRATION RATE: 16 BRPM | OXYGEN SATURATION: 98 % | BODY MASS INDEX: 22.22 KG/M2 | HEART RATE: 89 BPM | WEIGHT: 150 LBS | SYSTOLIC BLOOD PRESSURE: 131 MMHG | TEMPERATURE: 98.1 F | DIASTOLIC BLOOD PRESSURE: 88 MMHG

## 2021-12-01 LAB
ALBUMIN SERPL-MCNC: 3.6 G/DL (ref 3.4–5)
ANION GAP SERPL CALCULATED.3IONS-SCNC: 11 MMOL/L (ref 3–16)
BLOOD CULTURE, ROUTINE: NORMAL
BUN BLDV-MCNC: 18 MG/DL (ref 7–20)
CALCIUM SERPL-MCNC: 9.2 MG/DL (ref 8.3–10.6)
CHLORIDE BLD-SCNC: 104 MMOL/L (ref 99–110)
CO2: 25 MMOL/L (ref 21–32)
CREAT SERPL-MCNC: 1 MG/DL (ref 0.9–1.3)
CULTURE, BLOOD 2: NORMAL
GFR AFRICAN AMERICAN: >60
GFR NON-AFRICAN AMERICAN: >60
GLUCOSE BLD-MCNC: 93 MG/DL (ref 70–99)
HCT VFR BLD CALC: 38.5 % (ref 40.5–52.5)
HEMOGLOBIN: 12.5 G/DL (ref 13.5–17.5)
MCH RBC QN AUTO: 27.9 PG (ref 26–34)
MCHC RBC AUTO-ENTMCNC: 32.5 G/DL (ref 31–36)
MCV RBC AUTO: 85.7 FL (ref 80–100)
PDW BLD-RTO: 13.7 % (ref 12.4–15.4)
PHOSPHORUS: 4.3 MG/DL (ref 2.5–4.9)
PLATELET # BLD: 339 K/UL (ref 135–450)
PMV BLD AUTO: 7.1 FL (ref 5–10.5)
POTASSIUM SERPL-SCNC: 3.9 MMOL/L (ref 3.5–5.1)
RBC # BLD: 4.49 M/UL (ref 4.2–5.9)
SODIUM BLD-SCNC: 140 MMOL/L (ref 136–145)
WBC # BLD: 7.7 K/UL (ref 4–11)

## 2021-12-01 PROCEDURE — 80069 RENAL FUNCTION PANEL: CPT

## 2021-12-01 PROCEDURE — 6370000000 HC RX 637 (ALT 250 FOR IP): Performed by: NURSE PRACTITIONER

## 2021-12-01 PROCEDURE — 2580000003 HC RX 258: Performed by: INTERNAL MEDICINE

## 2021-12-01 PROCEDURE — 96372 THER/PROPH/DIAG INJ SC/IM: CPT

## 2021-12-01 PROCEDURE — G0378 HOSPITAL OBSERVATION PER HR: HCPCS

## 2021-12-01 PROCEDURE — 36415 COLL VENOUS BLD VENIPUNCTURE: CPT

## 2021-12-01 PROCEDURE — 6370000000 HC RX 637 (ALT 250 FOR IP): Performed by: INTERNAL MEDICINE

## 2021-12-01 PROCEDURE — 85027 COMPLETE CBC AUTOMATED: CPT

## 2021-12-01 PROCEDURE — 6360000002 HC RX W HCPCS: Performed by: INTERNAL MEDICINE

## 2021-12-01 PROCEDURE — 96376 TX/PRO/DX INJ SAME DRUG ADON: CPT

## 2021-12-01 RX ORDER — OXYCODONE HYDROCHLORIDE 5 MG/1
5 TABLET ORAL EVERY 6 HOURS PRN
Qty: 28 TABLET | Refills: 0 | Status: SHIPPED | OUTPATIENT
Start: 2021-12-01 | End: 2021-12-08

## 2021-12-01 RX ORDER — FLUTICASONE PROPIONATE 50 MCG
1 SPRAY, SUSPENSION (ML) NASAL DAILY
Status: DISCONTINUED | OUTPATIENT
Start: 2021-12-01 | End: 2021-12-01 | Stop reason: HOSPADM

## 2021-12-01 RX ADMIN — SODIUM CHLORIDE, PRESERVATIVE FREE 10 ML: 5 INJECTION INTRAVENOUS at 08:20

## 2021-12-01 RX ADMIN — SALINE NASAL SPRAY 1 SPRAY: 1.5 SOLUTION NASAL at 03:42

## 2021-12-01 RX ADMIN — MORPHINE SULFATE 2 MG: 2 INJECTION, SOLUTION INTRAMUSCULAR; INTRAVENOUS at 08:20

## 2021-12-01 RX ADMIN — Medication 10 ML: at 02:42

## 2021-12-01 RX ADMIN — MORPHINE SULFATE 2 MG: 2 INJECTION, SOLUTION INTRAMUSCULAR; INTRAVENOUS at 02:42

## 2021-12-01 RX ADMIN — OXYCODONE AND ACETAMINOPHEN 1 TABLET: 10; 325 TABLET ORAL at 10:58

## 2021-12-01 RX ADMIN — KETOROLAC TROMETHAMINE 30 MG: 30 INJECTION, SOLUTION INTRAMUSCULAR; INTRAVENOUS at 11:59

## 2021-12-01 RX ADMIN — FLUTICASONE PROPIONATE 1 SPRAY: 50 SPRAY, METERED NASAL at 11:59

## 2021-12-01 RX ADMIN — OXYCODONE AND ACETAMINOPHEN 1 TABLET: 10; 325 TABLET ORAL at 00:20

## 2021-12-01 RX ADMIN — ACETAMINOPHEN 650 MG: 325 TABLET ORAL at 00:20

## 2021-12-01 RX ADMIN — ENOXAPARIN SODIUM 40 MG: 100 INJECTION SUBCUTANEOUS at 08:20

## 2021-12-01 RX ADMIN — OXYCODONE AND ACETAMINOPHEN 1 TABLET: 10; 325 TABLET ORAL at 04:36

## 2021-12-01 RX ADMIN — GABAPENTIN 600 MG: 300 CAPSULE ORAL at 08:20

## 2021-12-01 ASSESSMENT — PAIN SCALES - GENERAL
PAINLEVEL_OUTOF10: 7
PAINLEVEL_OUTOF10: 8
PAINLEVEL_OUTOF10: 8
PAINLEVEL_OUTOF10: 7
PAINLEVEL_OUTOF10: 7

## 2021-12-01 NOTE — DISCHARGE INSTR - COC
Isolation/Infection:   Isolation            No Isolation          Patient Infection Status       Infection Onset Added Last Indicated Last Indicated By Review Planned Expiration Resolved Resolved By    None active    Resolved    COVID-19 (Rule Out) 20 COVID-19 (Ordered)   20 Rule-Out Test Resulted    COVID-19 (Rule Out) 20 COVID-19 (Ordered)   20 Rule-Out Test Resulted    MRSA 20 Culture, Anaerobic and Aerobic   20 Santiago Harry, AMAIRANI            Nurse Assessment:  Last Vital Signs: /76   Pulse 91   Temp 98.2 °F (36.8 °C) (Oral)   Resp 16   Ht 5' 9\" (1.753 m)   Wt 150 lb (68 kg)   SpO2 98%   BMI 22.15 kg/m²     Last documented pain score (0-10 scale): Pain Level: 8  Last Weight:   Wt Readings from Last 1 Encounters:   21 150 lb (68 kg)     Mental Status:  {IP PT MENTAL STATUS:}    IV Access:  { ILIA IV ACCESS:154710389}    Nursing Mobility/ADLs:  Walking   {[facility] DME KTHL:424098659}  Transfer  {[facility] DME ZQJJ:328086639}  Bathing  {[facility] DME FQIK:986096900}  Dressing  {[facility] DME ARBT:840721535}  Toileting  {[facility] DME ZVAC:880055578}  Feeding  {[facility] DME YMGE:584819347}  Med Admin  {[facility] DME FDUX:290290219}  Med Delivery   { ILIA MED Delivery:597974315}    Wound Care Documentation and Therapy:        Elimination:  Continence: Bowel: {YES / A}  Bladder: {YES / PQ:87822}  Urinary Catheter: {Urinary Catheter:181348361}   Colostomy/Ileostomy/Ileal Conduit: {YES / FO:42698}       Date of Last BM: ***  No intake or output data in the 24 hours ending 21 0838  No intake/output data recorded.     Safety Concerns:     [address] [employer] Safety Concerns:861885160}    Impairments/Disabilities:      [address]ILL[address] Impairments/Disabilities:912538513}    Nutrition Therapy:  Current Nutrition Therapy:   [address] Diet List:274478832}    Routes of Feeding: {CHP DME Other Feedings:702445818}  Liquids: {Slp liquid thickness:67673}  Daily Fluid Restriction: {CHP DME Yes amt example:512230765}  Last Modified Barium Swallow with Video (Video Swallowing Test): {Done Not Done IONV:075539648}    Treatments at the Time of Hospital Discharge:   Respiratory Treatments: ***  Oxygen Therapy:  {Therapy; copd oxygen:84082}  Ventilator:    {MH CC Vent MFOH:890520306}    Rehab Therapies: {THERAPEUTIC INTERVENTION:8583160530}  Weight Bearing Status/Restrictions: {MH CC Weight Bearin}  Other Medical Equipment (for information only, NOT a DME order):  {EQUIPMENT:415844286}  Other Treatments: ***    Patient's personal belongings (please select all that are sent with patient):  {CHP DME Belongings:938849712}    RN SIGNATURE:  {Esignature:572511518}    CASE MANAGEMENT/SOCIAL WORK SECTION    Inpatient Status Date: ***    Readmission Risk Assessment Score:  Readmission Risk              Risk of Unplanned Readmission:  0           Discharging to Facility/ Agency   Name:   Address:  Phone:  Fax:    Dialysis Facility (if applicable)   Name:  Address:  Dialysis Schedule:  Phone:  Fax:    / signature: {Esignature:085067007}    PHYSICIAN SECTION    Prognosis: {Prognosis:7840861449}    Condition at Discharge: [address] [place] [address] Patient Condition:241358370}    Rehab Potential (if transferring to Rehab): {Prognosis:5886258000}    Recommended Labs or Other Treatments After Discharge: ***    Physician Certification: I certify the above information and transfer of Aaliyah Perez  is necessary for the continuing treatment of the diagnosis listed and that he requires {Admit to Appropriate Level of Care:25105} for {GREATER/LESS:001282381} 30 days.      Update Admission H&P: {CHP DME Changes in ZBRVK:527177793}    PHYSICIAN SIGNATURE:  {Esignature:403965496}

## 2021-12-01 NOTE — CARE COORDINATION
MIREILLE notes pt will NOT need IV ABX at DC per Dr. Meliza Navarro, [facility] to [place]. Had long discussion with pt prior day about community resources and IP/OP drug rehab. Pt declined any help, writer encouraged pt to use community resource binder if he changes in mind. No other DCP needs identified at this time.  Ly Ingram RN

## 2021-12-01 NOTE — PROGRESS NOTES
Pt's IV line removed without complications. Discussed d/c instructions with patient, given opportunity to ask questions, and provided new medication education with side effects. Follow up appointment information included in d/c instructions. Pt verbalized understanding of d/c instructions. Patient was discharged to home with all belongings and taken outside via wheelchair.     Misty Valerio RN

## 2021-12-01 NOTE — PROGRESS NOTES
Hospitalist Progress Note      PCP: JORDY Martinez - CNP    Date of Admission: 11/27/2021    Chief Complaint: Back Pain    Subjective: No new c/o. Medications:  Reviewed    Infusion Medications    sodium chloride 25 mL (11/30/21 0325)     Scheduled Medications    fluticasone  1 spray Each Nostril Daily    nicotine  1 patch TransDERmal Daily    gabapentin  600 mg Oral TID    mirtazapine  15 mg Oral Nightly    sodium chloride flush  5-40 mL IntraVENous 2 times per day    enoxaparin  40 mg SubCUTAneous Daily    buPROPion  100 mg Oral Nightly     PRN Meds: sodium chloride, morphine, ketorolac, sodium chloride flush, sodium chloride, ondansetron **OR** ondansetron, polyethylene glycol, acetaminophen **OR** acetaminophen, oxyCODONE-acetaminophen    No intake or output data in the 24 hours ending 12/01/21 0846    Physical Exam Performed:    /76   Pulse 91   Temp 98.2 °F (36.8 °C) (Oral)   Resp 16   Ht 5' 9\" (1.753 m)   Wt 150 lb (68 kg)   SpO2 98%   BMI 22.15 kg/m²     General appearance: No apparent distress, appears stated age and cooperative. HEENT: Pupils equal, round, and reactive to light. Conjunctivae/corneas clear. Neck: Supple, with full range of motion. No jugular venous distention. Trachea midline. Respiratory:  Normal respiratory effort. Clear to auscultation, bilaterally without Rales/Wheezes/Rhonchi. Cardiovascular: Regular rate and rhythm with normal S1/S2 without murmurs, rubs or gallops. Abdomen: Soft, non-tender, non-distended with normal bowel sounds. Musculoskeletal: No clubbing, cyanosis or edema bilaterally. Full range of motion without deformity. Skin: Skin color, texture, turgor normal.  No rashes or lesions. Neurologic:  Neurovascularly intact without any focal sensory/motor deficits.  Cranial nerves: II-XII intact, grossly non-focal.  Psychiatric: Alert and oriented, thought content appropriate, normal insight  Capillary Refill: Brisk,< 3 seconds Peripheral Pulses: +2 palpable, equal bilaterally       Labs:   Recent Labs     11/29/21  0928 12/01/21  0634   WBC 9.4 7.7   HGB 14.7 12.5*   HCT 44.6 38.5*    339     Recent Labs     11/29/21  0642 11/30/21  0523 12/01/21  0634   * 137 140   K 4.2 4.7 3.9    106 104   CO2 20* 20* 25   BUN 18 20 18   CREATININE 0.9 1.1 1.0   CALCIUM 8.6 9.1 9.2   PHOS  --  3.8 4.3     No results for input(s): AST, ALT, BILIDIR, BILITOT, ALKPHOS in the last 72 hours. No results for input(s): INR in the last 72 hours. No results for input(s): [facility] in the last 72 hours. Urinalysis:      Lab Results   Component Value Date    NITRU Negative 11/27/2021    WBCUA 0-2 03/21/2018    RBCUA 0-2 03/21/2018    BLOODU Negative 11/27/2021    SPECGRAV 1.025 11/27/2021    GLUCOSEU Negative 11/27/2021       Consults:    IP CONSULT TO HOSPITALIST  PHARMACY TO DOSE VANCOMYCIN  IP CONSULT TO INFECTIOUS DISEASES  IP CONSULT TO PHARMACY  IP CONSULT TO SOCIAL WORK      Assessment/Plan:    Active Hospital Problems    Diagnosis     Discitis [M46.40]     IVDU (intravenous drug user) [F19.90]        Discitis - Previously diagnosed and did not complete treatment. Started on IV Vancomycin w/ past Cx demonstrating MRSA - likely 2nd to IVDU. Follow serial labs w/out evidence of ABX induced nephrotoxicity. Infectious Disease consulted and appreciated. MRI 29 Nov w/out abscess but w/ changes certainly c/w known hx of discitis - though felt to have no active infection and ABX d/c'd. Continue PO/IV narcotic analgesia as ordered. Sepsis - w/ Leukocytosis/Tachycardia POArrival 2nd to above infection.   Continue IVF as appropriate and monitor clinical response w/ ABX as written.      Amphetamine abuse, IVDU  - tox screen positive for amphetamines  - counseling given     Anxiety/depression - controlled on home Wellbutrin/Remeron - continued.        DVT Prophylaxis: LMWH     Recent Labs     11/29/21 0928 12/01/21  0634   PLT 293 339     Diet: ADULT DIET; Regular  Code Status: Full Code      PT/OT Eval Status: not yet ordered. Dispo - Likely to home Wed 1 Dec pending clinical course and subspecialty recs.       Malcom Curry MD

## 2021-12-02 NOTE — DISCHARGE SUMMARY
Hospital Medicine Discharge Summary    Patient ID: Tasha Montero      Patient's PCP: JORDY Quijano - CNP    Admit Date: 11/27/2021     Discharge Date: 12/1/2021      Admitting Physician: Ramona Hyman DO     Discharge Physician: Thea Wilcox MD     Discharge Diagnoses: Active Hospital Problems    Diagnosis     Discitis [M46.40]     IVDU (intravenous drug user) [F19.90]        The patient was seen and examined on day of discharge and this discharge summary is in conjunction with any daily progress note from day of discharge. Hospital Course:         Discitis - Previously diagnosed and did not complete treatment. Started on IV Vancomycin w/ past Cx demonstrating MRSA - likely 2nd to IVDU. Follow serial labs w/out evidence of ABX induced nephrotoxicity. Infectious Disease consulted and appreciated. MRI 29 Nov w/out abscess but w/ changes certainly c/w known hx of discitis - though felt to have no active infection and ABX d/c'd. Continue PO/IV narcotic analgesia as ordered.      Sepsis - w/ Leukocytosis/Tachycardia POArrival 2nd to above infection. Continue IVF as appropriate and monitor clinical response w/ ABX as written.      Amphetamine abuse, IVDU  - tox screen positive for amphetamines  - counseling given     Anxiety/depression - controlled on home Wellbutrin/Remeron - continued.        Labs:  For convenience and continuity at follow-up the following most recent labs are provided:      CBC:    Lab Results   Component Value Date    WBC 7.7 12/01/2021    HGB 12.5 12/01/2021    HCT 38.5 12/01/2021     12/01/2021       Renal:    Lab Results   Component Value Date     12/01/2021    K 3.9 12/01/2021    K 4.2 11/29/2021     12/01/2021    CO2 25 12/01/2021    BUN 18 12/01/2021    CREATININE 1.0 12/01/2021    CALCIUM 9.2 12/01/2021    PHOS 4.3 12/01/2021         Significant Diagnostic Studies    Radiology:   MRI LUMBAR SPINE W WO CONTRAST   Final Result   There is mild enhancement of the L5-S1 disc, and mild marrow edema in the L5   inferior endplate, without edema in the S1 superior endplate. These findings   are favored to represent degenerative disc disease, however mild/early   discitis is not excluded. No evidence of epidural abscess. Degenerative changes result in minimal spinal stenosis at L3-4 and mild left   neural foraminal stenosis at L5-S1. No high-grade spinal or neural foraminal   stenosis is evident. XR LUMBAR SPINE (2-3 VIEWS)   Final Result   Grossly stable degenerative disc disease at L5-S1 with no acute abnormality   identified. US SCROTUM AND TESTICLES   Final Result   Unremarkable testicular ultrasound with normal Doppler flow. US DUP ABD PEL RETRO SCROT COMPLETE   Final Result   Unremarkable testicular ultrasound with normal Doppler flow. XR CHEST PORTABLE   Final Result   Suspect right basilar pneumonia. Consults:     IP CONSULT TO HOSPITALIST  PHARMACY TO DOSE VANCOMYCIN  IP CONSULT TO INFECTIOUS DISEASES  IP CONSULT TO PHARMACY  IP CONSULT TO SOCIAL WORK    Disposition: Home     Condition at Discharge: Stable    Discharge Instructions/Follow-up:  w/ PCP 1-2 weeks and subspecialists as arranged. Code Status:  Full Code    Activity: activity as tolerated    Diet: regular diet      Discharge Medications:     Discharge Medication List as of 12/1/2021  2:05 PM           Details   oxyCODONE (ROXICODONE) 5 MG immediate release tablet Take 1 tablet by mouth every 6 hours as needed for Pain for up to 7 days. Intended supply: 7 days.  Take lowest dose possible to manage pain, Disp-28 tablet, R-0Print              Details   mirtazapine (REMERON) 15 MG tablet Take by mouthHistorical Med      mupirocin (BACTROBAN) 2 % ointment Apply topically 3 times daily, Topical, 3 TIMES DAILY Starting Thu 6/24/2021, Historical Med      traZODone (DESYREL) 100 MG tablet Take 100 mg by mouth nightlyHistorical Med lidocaine-prilocaine (EMLA) 2.5-2.5 % cream Apply to the most painful wounds once daily at time of dressing change, as needed for pain -- leave on for 20 minutes and then wipe off before applying antibiotic cream., Disp-60 g, R-1, Normal      ibuprofen (ADVIL;MOTRIN) 800 MG tablet Take 800 mg by mouth every 8 hours as needed for Pain Historical Med      melatonin 3 MG TABS tablet Take 1 tablet by mouth nightly, Disp-30 tablet,R-3Print      busPIRone (BUSPAR) 15 MG tablet Take 15 mg by mouth 3 times daily as needed Indications: Feeling AnxiousHistorical Med      gabapentin (NEURONTIN) 600 MG tablet Take 600 mg by mouth 3 times daily. Historical Med      buPROPion (WELLBUTRIN SR) 100 MG extended release tablet Take 100 mg by mouth 2 times dailyHistorical Med      OLANZapine (ZYPREXA) 10 MG tablet Take 1 tablet by mouth nightly, Disp-30 tablet, R-0Normal             Time Spent on discharge is more than 30 minutes in the examination, evaluation, counseling and review of medications and discharge plan. Signed:    Jb Akhtar MD   12/2/2021      Thank you JORDY Natarajan CNP for the opportunity to be involved in this patient's care. If you have any questions or concerns please feel free to contact me at [phone].

## 2022-03-01 NOTE — TELEPHONE ENCOUNTER
Consult for lumbaroscarual with osteomyelitis     Room 522  @ [facility]       Nurse is Ca Tony her #  [phone]
OR 3 notified/yes

## 2022-03-19 ENCOUNTER — HOSPITAL ENCOUNTER (EMERGENCY)
Age: 42
Discharge: HOME | End: 2022-03-19
Attending: STUDENT IN AN ORGANIZED HEALTH CARE EDUCATION/TRAINING PROGRAM
Payer: COMMERCIAL

## 2022-03-19 ENCOUNTER — APPOINTMENT (OUTPATIENT)
Dept: CT IMAGING | Age: 42
End: 2022-03-19
Payer: COMMERCIAL

## 2022-03-19 ENCOUNTER — APPOINTMENT (OUTPATIENT)
Dept: GENERAL RADIOLOGY | Age: 42
End: 2022-03-19
Payer: COMMERCIAL

## 2022-03-19 VITALS
DIASTOLIC BLOOD PRESSURE: 90 MMHG | WEIGHT: 160 LBS | HEART RATE: 73 BPM | HEIGHT: 69 IN | OXYGEN SATURATION: 100 % | BODY MASS INDEX: 23.7 KG/M2 | RESPIRATION RATE: 16 BRPM | TEMPERATURE: 97 F | SYSTOLIC BLOOD PRESSURE: 141 MMHG

## 2022-03-19 DIAGNOSIS — T14.8XXA OPEN WOUND: ICD-10-CM

## 2022-03-19 DIAGNOSIS — M79.89 LEFT ARM SWELLING: Primary | ICD-10-CM

## 2022-03-19 DIAGNOSIS — R82.5 POSITIVE URINE DRUG SCREEN: ICD-10-CM

## 2022-03-19 LAB
A/G RATIO: 1.2 (ref 1.1–2.2)
ALBUMIN SERPL-MCNC: 3.5 G/DL (ref 3.4–5)
ALP BLD-CCNC: 100 U/L (ref 40–129)
ALT SERPL-CCNC: 29 U/L (ref 10–40)
AMPHETAMINE SCREEN, URINE: POSITIVE
ANION GAP SERPL CALCULATED.3IONS-SCNC: 10 MMOL/L (ref 3–16)
AST SERPL-CCNC: 29 U/L (ref 15–37)
BARBITURATE SCREEN URINE: ABNORMAL
BASOPHILS ABSOLUTE: 0 K/UL (ref 0–0.2)
BASOPHILS RELATIVE PERCENT: 0.4 %
BENZODIAZEPINE SCREEN, URINE: ABNORMAL
BILIRUB SERPL-MCNC: <0.2 MG/DL (ref 0–1)
BILIRUBIN URINE: NEGATIVE
BLOOD, URINE: NEGATIVE
BUN BLDV-MCNC: 14 MG/DL (ref 7–20)
C-REACTIVE PROTEIN: 49.4 MG/L (ref 0–5.1)
CALCIUM SERPL-MCNC: 8.9 MG/DL (ref 8.3–10.6)
CANNABINOID SCREEN URINE: POSITIVE
CHLORIDE BLD-SCNC: 101 MMOL/L (ref 99–110)
CLARITY: CLEAR
CO2: 28 MMOL/L (ref 21–32)
COCAINE METABOLITE SCREEN URINE: ABNORMAL
COLOR: YELLOW
CREAT SERPL-MCNC: 0.9 MG/DL (ref 0.9–1.3)
D DIMER: 669 NG/ML DDU (ref 0–229)
EKG ATRIAL RATE: 70 BPM
EKG DIAGNOSIS: NORMAL
EKG P AXIS: 78 DEGREES
EKG P-R INTERVAL: 146 MS
EKG Q-T INTERVAL: 400 MS
EKG QRS DURATION: 90 MS
EKG QTC CALCULATION (BAZETT): 432 MS
EKG R AXIS: 73 DEGREES
EKG T AXIS: 80 DEGREES
EKG VENTRICULAR RATE: 70 BPM
EOSINOPHILS ABSOLUTE: 0.8 K/UL (ref 0–0.6)
EOSINOPHILS RELATIVE PERCENT: 8.5 %
GFR AFRICAN AMERICAN: >60
GFR NON-AFRICAN AMERICAN: >60
GLUCOSE BLD-MCNC: 86 MG/DL (ref 70–99)
GLUCOSE URINE: NEGATIVE MG/DL
HCT VFR BLD CALC: 33.9 % (ref 40.5–52.5)
HEMOGLOBIN: 11.4 G/DL (ref 13.5–17.5)
KETONES, URINE: NEGATIVE MG/DL
LACTIC ACID, SEPSIS: 1.1 MMOL/L (ref 0.4–1.9)
LEUKOCYTE ESTERASE, URINE: NEGATIVE
LYMPHOCYTES ABSOLUTE: 2 K/UL (ref 1–5.1)
LYMPHOCYTES RELATIVE PERCENT: 22.3 %
Lab: ABNORMAL
MCH RBC QN AUTO: 28.1 PG (ref 26–34)
MCHC RBC AUTO-ENTMCNC: 33.7 G/DL (ref 31–36)
MCV RBC AUTO: 83.6 FL (ref 80–100)
METHADONE SCREEN, URINE: ABNORMAL
MICROSCOPIC EXAMINATION: NORMAL
MONOCYTES ABSOLUTE: 0.3 K/UL (ref 0–1.3)
MONOCYTES RELATIVE PERCENT: 3.8 %
NEUTROPHILS ABSOLUTE: 5.9 K/UL (ref 1.7–7.7)
NEUTROPHILS RELATIVE PERCENT: 65 %
NITRITE, URINE: NEGATIVE
OPIATE SCREEN URINE: ABNORMAL
OXYCODONE URINE: ABNORMAL
PDW BLD-RTO: 13.4 % (ref 12.4–15.4)
PH UA: 8
PH UA: 8 (ref 5–8)
PHENCYCLIDINE SCREEN URINE: ABNORMAL
PLATELET # BLD: 388 K/UL (ref 135–450)
PMV BLD AUTO: 7.2 FL (ref 5–10.5)
POTASSIUM REFLEX MAGNESIUM: 4.7 MMOL/L (ref 3.5–5.1)
PRO-BNP: 421 PG/ML (ref 0–124)
PROCALCITONIN: 0.11 NG/ML (ref 0–0.15)
PROPOXYPHENE SCREEN: ABNORMAL
PROTEIN UA: NEGATIVE MG/DL
RBC # BLD: 4.06 M/UL (ref 4.2–5.9)
SEDIMENTATION RATE, ERYTHROCYTE: 37 MM/HR (ref 0–15)
SODIUM BLD-SCNC: 139 MMOL/L (ref 136–145)
SPECIFIC GRAVITY UA: 1.01 (ref 1–1.03)
TOTAL PROTEIN: 6.5 G/DL (ref 6.4–8.2)
TROPONIN: <0.01 NG/ML
URINE REFLEX TO CULTURE: NORMAL
URINE TYPE: NORMAL
UROBILINOGEN, URINE: 1 E.U./DL
WBC # BLD: 9 K/UL (ref 4–11)

## 2022-03-19 PROCEDURE — 71260 CT THORAX DX C+: CPT

## 2022-03-19 PROCEDURE — 85379 FIBRIN DEGRADATION QUANT: CPT

## 2022-03-19 PROCEDURE — 6370000000 HC RX 637 (ALT 250 FOR IP): Performed by: NURSE PRACTITIONER

## 2022-03-19 PROCEDURE — 86140 C-REACTIVE PROTEIN: CPT

## 2022-03-19 PROCEDURE — 93005 ELECTROCARDIOGRAM TRACING: CPT | Performed by: NURSE PRACTITIONER

## 2022-03-19 PROCEDURE — 6360000004 HC RX CONTRAST MEDICATION: Performed by: NURSE PRACTITIONER

## 2022-03-19 PROCEDURE — 83880 ASSAY OF NATRIURETIC PEPTIDE: CPT

## 2022-03-19 PROCEDURE — 99284 EMERGENCY DEPT VISIT MOD MDM: CPT

## 2022-03-19 PROCEDURE — 85652 RBC SED RATE AUTOMATED: CPT

## 2022-03-19 PROCEDURE — 71046 X-RAY EXAM CHEST 2 VIEWS: CPT

## 2022-03-19 PROCEDURE — 85025 COMPLETE CBC W/AUTO DIFF WBC: CPT

## 2022-03-19 PROCEDURE — 84484 ASSAY OF TROPONIN QUANT: CPT

## 2022-03-19 PROCEDURE — 36415 COLL VENOUS BLD VENIPUNCTURE: CPT

## 2022-03-19 PROCEDURE — 81003 URINALYSIS AUTO W/O SCOPE: CPT

## 2022-03-19 PROCEDURE — 80307 DRUG TEST PRSMV CHEM ANLYZR: CPT

## 2022-03-19 PROCEDURE — 80053 COMPREHEN METABOLIC PANEL: CPT

## 2022-03-19 PROCEDURE — 84145 PROCALCITONIN (PCT): CPT

## 2022-03-19 PROCEDURE — 83605 ASSAY OF LACTIC ACID: CPT

## 2022-03-19 PROCEDURE — 93010 ELECTROCARDIOGRAM REPORT: CPT | Performed by: INTERNAL MEDICINE

## 2022-03-19 RX ORDER — ACETAMINOPHEN 325 MG/1
650 TABLET ORAL ONCE
Status: COMPLETED | OUTPATIENT
Start: 2022-03-19 | End: 2022-03-19

## 2022-03-19 RX ORDER — DOXYCYCLINE HYCLATE 100 MG
100 TABLET ORAL 2 TIMES DAILY
Qty: 14 TABLET | Refills: 0 | Status: SHIPPED | OUTPATIENT
Start: 2022-03-19 | End: 2022-03-26

## 2022-03-19 RX ADMIN — IOPAMIDOL 85 ML: 755 INJECTION, SOLUTION INTRAVENOUS at 13:40

## 2022-03-19 RX ADMIN — APIXABAN 10 MG: 5 TABLET, FILM COATED ORAL at 14:39

## 2022-03-19 RX ADMIN — ACETAMINOPHEN 650 MG: 325 TABLET ORAL at 12:28

## 2022-03-19 ASSESSMENT — PAIN SCALES - GENERAL
PAINLEVEL_OUTOF10: 9
PAINLEVEL_OUTOF10: 8
PAINLEVEL_OUTOF10: 9

## 2022-03-19 ASSESSMENT — ENCOUNTER SYMPTOMS
FACIAL SWELLING: 0
SHORTNESS OF BREATH: 1
RHINORRHEA: 0
ABDOMINAL PAIN: 0
SORE THROAT: 0
COLOR CHANGE: 0

## 2022-03-19 ASSESSMENT — PAIN DESCRIPTION - ORIENTATION: ORIENTATION: RIGHT;LEFT

## 2022-03-19 ASSESSMENT — PAIN DESCRIPTION - PAIN TYPE: TYPE: ACUTE PAIN

## 2022-03-19 ASSESSMENT — PAIN DESCRIPTION - DESCRIPTORS: DESCRIPTORS: BURNING

## 2022-03-19 ASSESSMENT — PAIN DESCRIPTION - LOCATION: LOCATION: HAND

## 2022-03-19 NOTE — ED PROVIDER NOTES
I independently examined and evaluated Clifford Soni. In brief, patient is a 13-year-old male presenting with concerns for swelling bilateral foot swelling for the past 3 days. Patient does have a history of IV drug use. He also complains that he has had an achy sensation under his left arm for the past 2 days. He denies any associated fevers or chills. Denies abdominal pain. Denies cough or shortness of breath. Denies numbness or tingling. Denies other complaints or concerns. Focused exam revealed patient resting comfortably, no acute distress. Heart regular rate and rhythm. Lungs clear to auscultation bilaterally. There is soft tissue swelling of the left hand, however capillary refill is less than 2 seconds, sensation intact, 2+ radial pulses bilaterally. There is also some pitting edema of the lower extremities primarily localized to the foot and lower leg. No calf swelling or tenderness. He does have a wound surrounded by erythema and some yellow crusting on his left upper arm.     Labs Reviewed   CBC WITH AUTO DIFFERENTIAL - Abnormal; Notable for the following components:       Result Value    RBC 4.06 (*)     Hemoglobin 11.4 (*)     Hematocrit 33.9 (*)     Eosinophils Absolute 0.8 (*)     All other components within normal limits   URINE DRUG SCREEN - Abnormal; Notable for the following components:    Amphetamine Screen, Urine POSITIVE (*)     Cannabinoid Scrn, Ur POSITIVE (*)     All other components within normal limits   D-DIMER, QUANTITATIVE - Abnormal; Notable for the following components:    D-Dimer, Quant 669 (*)     All other components within normal limits   BRAIN NATRIURETIC PEPTIDE - Abnormal; Notable for the following components:    Pro- (*)     All other components within normal limits   SEDIMENTATION RATE - Abnormal; Notable for the following components:    Sed Rate 37 (*)     All other components within normal limits   C-REACTIVE PROTEIN - Abnormal; Notable for the following components:    CRP 49.4 (*)     All other components within normal limits   COMPREHENSIVE METABOLIC PANEL W/ REFLEX TO MG FOR LOW K   URINALYSIS WITH REFLEX TO CULTURE   PROCALCITONIN   LACTATE, SEPSIS   TROPONIN     CT CHEST PULMONARY EMBOLISM W CONTRAST   Final Result   No evidence of pulmonary embolism or acute pulmonary abnormality. XR CHEST (2 VW)   Final Result   Normal chest.         VL Extremity Venous Left    (Results Pending)     ED course: Patient is a 69-year-old male, presenting with concerns for left hand and bilateral lower extremity swelling. Patient was seen in conjunction with GIULIA Gardiner, please refer to her note for further details of the patient's work-up and treatment. Inflammatory markers are elevated here. He does have a wound that appears to be infected on his left upper arm and he will be started on antibiotics. Given his swelling, CT with PE protocol was performed and did not reveal any evidence of pulmonary embolism or acute pulmonary abnormality. At this time, cannot fully rule out that there is not an underlying DVT especially given his history of IV drug use, he will be started on Eliquis starter pack and will be scheduled for outpatient ultrasound for further evaluation. Patient is comfortable in agreement with plan of care and was discharged. All diagnostic, treatment, and disposition decisions were made by myself in conjunction with the advanced practice provider. For all further details of the patient's emergency department visit, please see the advanced practice provider's documentation. 1. Left arm swelling    2. Positive urine drug screen    3. Open wound      Comment: Please note this report has been produced using speech recognition software and may contain errors related to that system including errors in grammar, punctuation, and spelling, as well as words and phrases that may be inappropriate.  If there are any questions or concerns please feel free to contact the dictating provider for clarification.        Roseanna Monaco MD  03/19/22 7910

## 2022-03-19 NOTE — ED NOTES
Patient reported swelling of the hands and feet with increased pain.        Lino Mena RN  03/19/22 [id number]

## 2022-03-19 NOTE — ED PROVIDER NOTES
[address] ED  EMERGENCY DEPARTMENT ENCOUNTER        Pt Name: Saadia Blevins  MRN: [id number]  Armstrongfnorberto 1980  Date of evaluation: 3/19/2022  Provider: JORDY Rosenthal CNP  PCP: No primary care provider on file. ED Attending: No att. providers found    [facility]       Chief Complaint   Patient presents with    Foot Swelling     pt c/o bilat hand and feet swelling x 3 days, states has been painful and is unable to use hands       HISTORY OF PRESENT ILLNESS   (Location/Symptom, Timing/Onset, Context/Setting, Quality, Duration, Modifying Factors, Severity)  Note limiting factors. Saadia Blevins is a 35 y.o. male for hand and foot swelling. Onset was 3 days. Context includes patient reports that he has had bilateral hand and foot swelling for the last 3 days. Patient attributed to carpal tunnel due to the fact that he uses a hammer however he states his symptoms are not getting any better. Patient also complains of achiness to the inner portion of his left upper arm. Patient reports pleuritic chest pain and shortness of breath as well. Patient reports his last IV drug use was approximately 1 year ago. Alleviating factors include nothing. Aggravating factors include nothing. Pain is 8/10. Ibuprofen and Aleve has been used for pain today. Nursing Notes were all reviewed and agreed with or any disagreements were addressed  in the HPI. REVIEW OF SYSTEMS  (2-9 systems for level 4, 10 or more for level 5)     Review of Systems   Constitutional: Negative for activity change, appetite change and fever. HENT: Negative for congestion, facial swelling, rhinorrhea and sore throat. Eyes: Negative for visual disturbance. Respiratory: Positive for shortness of breath. Cardiovascular: Positive for chest pain. Gastrointestinal: Negative for abdominal pain. Genitourinary: Negative for difficulty urinating.    Musculoskeletal: Negative for arthralgias and myalgias. Hand and foot swelling   Skin: Negative for color change and rash. Neurological: Negative for dizziness and light-headedness. Psychiatric/Behavioral: Negative for agitation. All other systems reviewed and are negative. Positivesand Pertinent negatives as per HPI. Except as noted above in the ROS, all other systems were reviewed and negative.        PAST MEDICAL HISTORY     Past Medical History:   Diagnosis Date    Back pain     Cancer ([facility] [address]     Pancreatic cancer    Depression 12/09/2019    GERD (gastroesophageal reflux disease)     Hepatitis C     [facility] drug overdose ([facility]     MRSA (methicillin resistant staph aureus) culture positive 09/25/2020    L5 disc asp    Polysubstance abuse ([facility]     Wears glasses          SURGICAL HISTORY       Past Surgical History:   Procedure Laterality Date    APPENDECTOMY      IR PERC ASPIRATION INTERVERT [address]  11/25/2020    IR PERC ASPIRATION INTERVERT [address] 11/25/2020 [facility] SPECIAL PROCEDURES    SHOULDER SURGERY Left 04/01/2014    UPPER GASTROINTESTINAL ENDOSCOPY  01/01/2014         CURRENT MEDICATIONS       Discharge Medication List as of 3/19/2022  2:36 PM      CONTINUE these medications which have NOT CHANGED    Details   mirtazapine (REMERON) 15 MG tablet Take by mouthHistorical Med      mupirocin (BACTROBAN) 2 % ointment Apply topically 3 times daily, Topical, 3 TIMES DAILY Starting Thu 6/24/2021, Historical Med      traZODone (DESYREL) 100 MG tablet Take 100 mg by mouth nightlyHistorical Med      lidocaine-prilocaine (EMLA) 2.5-2.5 % cream Apply to the most painful wounds once daily at time of dressing change, as needed for pain -- leave on for 20 minutes and then wipe off before applying antibiotic cream., Disp-60 g, R-1, Normal      ibuprofen (ADVIL;MOTRIN) 800 MG tablet Take 800 mg by mouth every 8 hours as needed for Pain Historical Med      melatonin 3 MG TABS tablet Take 1 tablet by mouth nightly, Disp-30 tablet,R-3Print busPIRone (BUSPAR) 15 MG tablet Take 15 mg by mouth 3 times daily as needed Indications: Feeling AnxiousHistorical Med      gabapentin (NEURONTIN) 600 MG tablet Take 600 mg by mouth 3 times daily. Historical Med      buPROPion (WELLBUTRIN SR) 100 MG extended release tablet Take 100 mg by mouth 2 times dailyHistorical Med      OLANZapine (ZYPREXA) 10 MG tablet Take 1 tablet by mouth nightly, Disp-30 tablet, R-0Normal               ALLERGIES     Patient has no known allergies. FAMILY HISTORY       Family History   Problem Relation Age of Onset    Cancer Mother     Heart Disease Father          SOCIAL HISTORY       Social History     Socioeconomic History    Marital status:      Spouse name: None    Number of children: None    Years of education: None    Highest education level: None   Occupational History    None   Tobacco Use    Smoking status: Current Every Day Smoker     Packs/day: 0.50     Types: Cigarettes    Smokeless tobacco: Never Used   Vaping Use    Vaping Use: Never used   Substance and Sexual Activity    Alcohol use: Never    Drug use: Yes     Frequency: 7.0 times per week     Types: Marijuana (Weed), Methamphetamines (Crystal Meth), IV, Opiates     Sexual activity: Yes     Partners: Female   Other Topics Concern    None   Social History Narrative    ** Merged History Encounter **          Social Determinants of Health     Financial Resource Strain:     Difficulty of Paying Living Expenses: Not on file   Food Insecurity:     Worried About Running Out of Food in the Last Year: Not on file    Mario of Food in the Last Year: Not on file   Transportation Needs:     Lack of Transportation (Medical): Not on file    Lack of Transportation (Non-Medical):  Not on file   Physical Activity:     Days of Exercise per Week: Not on file    Minutes of Exercise per Session: Not on file   Stress:     Feeling of Stress : Not on file   Social Connections:     Frequency of Communication oriented to person, place, and time. DIAGNOSTIC RESULTS   LABS:    Labs Reviewed   CBC WITH AUTO DIFFERENTIAL - Abnormal; Notable for the following components:       Result Value    RBC 4.06 (*)     Hemoglobin 11.4 (*)     Hematocrit 33.9 (*)     Eosinophils Absolute 0.8 (*)     All other components within normal limits   URINE DRUG SCREEN - Abnormal; Notable for the following components:    Amphetamine Screen, Urine POSITIVE (*)     Cannabinoid Scrn, Ur POSITIVE (*)     All other components within normal limits   D-DIMER, QUANTITATIVE - Abnormal; Notable for the following components:    D-Dimer, Quant 669 (*)     All other components within normal limits   BRAIN NATRIURETIC PEPTIDE - Abnormal; Notable for the following components:    Pro- (*)     All other components within normal limits   SEDIMENTATION RATE - Abnormal; Notable for the following components:    Sed Rate 37 (*)     All other components within normal limits   C-REACTIVE PROTEIN - Abnormal; Notable for the following components:    CRP 49.4 (*)     All other components within normal limits   COMPREHENSIVE METABOLIC PANEL W/ REFLEX TO MG FOR LOW K   URINALYSIS WITH REFLEX TO CULTURE   PROCALCITONIN   LACTATE, SEPSIS   TROPONIN       All other labs were within normal range or not returned as of this dictation. EKG: All EKG's are interpreted by the Emergency Department Physician who either signs or Co-signs this chart in the absence of a cardiologist.  Please see their note for interpretation of EKG. RADIOLOGY:     Chest x-ray interpreted by radiologist for  Normal chest x-ray    CT chest pulmonary embolism with contrast interpreted by radiologist for    FINDINGS:   Pulmonary Arteries: Pulmonary arteries are adequately opacified for   evaluation.  No evidence of intraluminal filling defect to suggest pulmonary   embolism.  Main pulmonary artery is normal in caliber.      Mediastinum: The thoracic aorta is normal in caliber.  The heart November 2021 with a positive drug screen and diagnosis of discitis. Lab values have been reviewed and interpreted. Chest x-ray has been ordered and reviewed as well. Patient was noted to have elevated D-dimer therefore sent CT chest with IV pulmonary embolism was obtained and was negative for any PE. Patient was noted to have a healing wound noted to his left arm which had some erythema. This could contribute to his elevated sed rate. Patient did have a positive drug screen despite denying using any drugs. Patient will be started on Eliquis. He was given dose in the ED and a starter pack to go home with. He was also given a prescription for antibiotics that was called to his pharmacy. Patient will be treated for an infection to his arm and has been given a prescription for an outpatient ultrasound to his left arm. Patient was encouraged to follow-up with [facility] scheduling to arrange the ultrasound and the next few days. He he was encouraged to return to the ED for worsening symptoms. He was also encouraged to follow-up with the PCP that was provided to him. Patient was ultimately discharged with all questions answered. The patient tolerated their visit well. They were seen and evaluated by the attending physician, No att. providers found who agreed with the assessment and plan. The patient and / or the family were informed of the results of any tests, a time was given to answer questions, a plan was proposed and they agreed with plan. FINAL IMPRESSION      1. Left arm swelling    2. Positive urine drug screen    3.  Open wound          DISPOSITION/PLAN   DISPOSITION Decision To Discharge 03/19/2022 02:45:20 PM      PATIENT REFERRED TO:  Timi Austin  [phone]  Schedule an appointment as soon as possible for a visit in 1 week  to establish primary care    [facility] [place] [facility] [place]  [address]  [phone]    If symptoms worsen      DISCHARGE MEDICATIONS:  Discharge Medication List as of 3/19/2022  2:36 PM          DISCONTINUED MEDICATIONS:  Discharge Medication List as of 3/19/2022  2:36 PM                 (Please note that portions of this note were completed with a voice recognition program.  Efforts were made to edit the dictations but occasionally words are mis-transcribed.)    Prem Officer, JORDY Ruiz CNP (electronically signed)       Prem Officer, JORDY Ruiz CNP  03/19/22 [id number]

## 2022-03-19 NOTE — ED NOTES
Pt to radiology via stretcher in stable condition. Samuel Mancilla, RN       Samuel Mancilla, RN  03/19/22 0785

## 2022-03-19 NOTE — ED TRIAGE NOTES
Chief Complaint   Patient presents with    Foot Swelling     pt c/o bilat hand and feet swelling x 3 days, states has been painful and is unable to use hands

## 2022-11-21 ENCOUNTER — HOSPITAL ENCOUNTER (EMERGENCY)
Age: 42
Discharge: HOME | End: 2022-11-21
Payer: COMMERCIAL

## 2022-11-21 ENCOUNTER — APPOINTMENT (OUTPATIENT)
Dept: ULTRASOUND IMAGING | Age: 42
End: 2022-11-21
Payer: COMMERCIAL

## 2022-11-21 VITALS
WEIGHT: 158.2 LBS | HEIGHT: 68 IN | SYSTOLIC BLOOD PRESSURE: 148 MMHG | TEMPERATURE: 98.7 F | BODY MASS INDEX: 23.98 KG/M2 | DIASTOLIC BLOOD PRESSURE: 91 MMHG | HEART RATE: 114 BPM | OXYGEN SATURATION: 98 % | RESPIRATION RATE: 18 BRPM

## 2022-11-21 DIAGNOSIS — M54.50 ACUTE EXACERBATION OF CHRONIC LOW BACK PAIN: Primary | ICD-10-CM

## 2022-11-21 DIAGNOSIS — R82.5 POSITIVE URINE DRUG SCREEN: ICD-10-CM

## 2022-11-21 DIAGNOSIS — G89.29 ACUTE EXACERBATION OF CHRONIC LOW BACK PAIN: Primary | ICD-10-CM

## 2022-11-21 LAB
A/G RATIO: 1.2 (ref 1.1–2.2)
ALBUMIN SERPL-MCNC: 4.1 G/DL (ref 3.4–5)
ALP BLD-CCNC: 97 U/L (ref 40–129)
ALT SERPL-CCNC: 50 U/L (ref 10–40)
AMPHETAMINE SCREEN, URINE: POSITIVE
ANION GAP SERPL CALCULATED.3IONS-SCNC: 12 MMOL/L (ref 3–16)
AST SERPL-CCNC: 43 U/L (ref 15–37)
BACTERIA: ABNORMAL /HPF
BARBITURATE SCREEN URINE: ABNORMAL
BASOPHILS ABSOLUTE: 0.2 K/UL (ref 0–0.2)
BASOPHILS RELATIVE PERCENT: 1.6 %
BENZODIAZEPINE SCREEN, URINE: ABNORMAL
BILIRUB SERPL-MCNC: 0.5 MG/DL (ref 0–1)
BILIRUBIN URINE: NEGATIVE
BLOOD, URINE: ABNORMAL
BUN BLDV-MCNC: 18 MG/DL (ref 7–20)
CALCIUM SERPL-MCNC: 9.1 MG/DL (ref 8.3–10.6)
CANNABINOID SCREEN URINE: POSITIVE
CHLORIDE BLD-SCNC: 97 MMOL/L (ref 99–110)
CLARITY: CLEAR
CO2: 25 MMOL/L (ref 21–32)
COCAINE METABOLITE SCREEN URINE: ABNORMAL
COLOR: YELLOW
CREAT SERPL-MCNC: 0.8 MG/DL (ref 0.9–1.3)
EOSINOPHILS ABSOLUTE: 0.2 K/UL (ref 0–0.6)
EOSINOPHILS RELATIVE PERCENT: 2.5 %
EPITHELIAL CELLS, UA: ABNORMAL /HPF (ref 0–5)
FENTANYL SCREEN, URINE: POSITIVE
GFR SERPL CREATININE-BSD FRML MDRD: >60 ML/MIN/{1.73_M2}
GLUCOSE BLD-MCNC: 117 MG/DL (ref 70–99)
GLUCOSE URINE: NEGATIVE MG/DL
HCT VFR BLD CALC: 38.7 % (ref 40.5–52.5)
HEMOGLOBIN: 12.7 G/DL (ref 13.5–17.5)
KETONES, URINE: NEGATIVE MG/DL
LEUKOCYTE ESTERASE, URINE: NEGATIVE
LYMPHOCYTES ABSOLUTE: 1 K/UL (ref 1–5.1)
LYMPHOCYTES RELATIVE PERCENT: 10.3 %
Lab: ABNORMAL
MCH RBC QN AUTO: 29 PG (ref 26–34)
MCHC RBC AUTO-ENTMCNC: 32.7 G/DL (ref 31–36)
MCV RBC AUTO: 88.7 FL (ref 80–100)
METHADONE SCREEN, URINE: ABNORMAL
MICROSCOPIC EXAMINATION: YES
MONOCYTES ABSOLUTE: 0.8 K/UL (ref 0–1.3)
MONOCYTES RELATIVE PERCENT: 8.1 %
MUCUS: ABNORMAL /LPF
NEUTROPHILS ABSOLUTE: 7.8 K/UL (ref 1.7–7.7)
NEUTROPHILS RELATIVE PERCENT: 77.5 %
NITRITE, URINE: NEGATIVE
OPIATE SCREEN URINE: ABNORMAL
OXYCODONE URINE: ABNORMAL
PDW BLD-RTO: 14.8 % (ref 12.4–15.4)
PH UA: 7
PH UA: 7 (ref 5–8)
PHENCYCLIDINE SCREEN URINE: ABNORMAL
PLATELET # BLD: 305 K/UL (ref 135–450)
PMV BLD AUTO: 7.9 FL (ref 5–10.5)
POTASSIUM REFLEX MAGNESIUM: 3.7 MMOL/L (ref 3.5–5.1)
PROTEIN UA: NEGATIVE MG/DL
RBC # BLD: 4.37 M/UL (ref 4.2–5.9)
RBC UA: ABNORMAL /HPF (ref 0–4)
SODIUM BLD-SCNC: 134 MMOL/L (ref 136–145)
SPECIFIC GRAVITY UA: 1.02 (ref 1–1.03)
TOTAL PROTEIN: 7.4 G/DL (ref 6.4–8.2)
URINE REFLEX TO CULTURE: ABNORMAL
URINE TYPE: ABNORMAL
UROBILINOGEN, URINE: 0.2 E.U./DL
WBC # BLD: 10.1 K/UL (ref 4–11)
WBC UA: ABNORMAL /HPF (ref 0–5)

## 2022-11-21 PROCEDURE — 99284 EMERGENCY DEPT VISIT MOD MDM: CPT

## 2022-11-21 PROCEDURE — 76870 US EXAM SCROTUM: CPT

## 2022-11-21 PROCEDURE — 85025 COMPLETE CBC W/AUTO DIFF WBC: CPT

## 2022-11-21 PROCEDURE — 80053 COMPREHEN METABOLIC PANEL: CPT

## 2022-11-21 PROCEDURE — 80307 DRUG TEST PRSMV CHEM ANLYZR: CPT

## 2022-11-21 PROCEDURE — 81001 URINALYSIS AUTO W/SCOPE: CPT

## 2022-11-21 RX ORDER — NAPROXEN 500 MG/1
500 TABLET ORAL 2 TIMES DAILY WITH MEALS
Qty: 30 TABLET | Refills: 0 | Status: SHIPPED | OUTPATIENT
Start: 2022-11-21

## 2022-11-21 RX ORDER — LIDOCAINE 50 MG/G
1 PATCH TOPICAL DAILY
Qty: 30 PATCH | Refills: 0 | Status: SHIPPED | OUTPATIENT
Start: 2022-11-21

## 2022-11-21 ASSESSMENT — PAIN DESCRIPTION - LOCATION: LOCATION: BACK;SCROTUM

## 2022-11-21 ASSESSMENT — PAIN - FUNCTIONAL ASSESSMENT
PAIN_FUNCTIONAL_ASSESSMENT: 0-10
PAIN_FUNCTIONAL_ASSESSMENT: 0-10

## 2022-11-21 ASSESSMENT — PAIN SCALES - GENERAL
PAINLEVEL_OUTOF10: 5
PAINLEVEL_OUTOF10: 9

## 2022-11-21 NOTE — ED PROVIDER NOTES
**ADVANCED PRACTICE PROVIDER, I HAVE EVALUATED THIS [facility]  ED  EMERGENCY DEPARTMENT ENCOUNTER      Pt Name: Allie Zhao  CTT:[id number]  [place] 1980  Date of evaluation: 11/21/2022  Provider: JORDY Pascal CNP  Note Started: 3:48 PM EST 11/22/2022        Chief Complaint:    Chief Complaint   Patient presents with    Back Pain     Lower back pain radiating into left testicle, onset yesterday. Emesis. Reports dysuria, urgency. Hx of stones. Nursing Notes, Past Medical Hx, Past Surgical Hx, Social Hx, Allergies, and Family Hx were all reviewed and agreed with or any disagreements were addressed in the HPI.    HPI: (Location, Duration, Timing, Severity, Quality, Assoc Sx, Context, Modifying factors)    Chief Complaint of chronic lower back pain    This is a  35 y.o. male who presents today with chronic low back pain, he states that he woke up with the pain but is also having left testicular pain and pain in his left groin. He complains of dysuria, but denies any discharge, but tells me his wife recently had a fair that he found out about. Patient also was recently incarcerated. Patient is quite anxious and having a hard time sitting still, he denies any alcohol or drug use. He rates that lower back pain that goes into his left testicle and 9 out of 10, movement worsens the pain. However he is not take anything for the pain. Denies any falls traumas or injuries. No numbness numbing or paresthesias, no saddle anesthesia, no loss of bowel bladder control or urinary retention. Denies any additional complaints, no additional aggravating relieving factors. Patient presents awake, alert and in no acute respiratory distress or toxic appearance.     PastMedical/Surgical History:      Diagnosis Date    Back pain     Cancer ([facility] [address]     Pancreatic cancer    Depression 12/09/2019    GERD (gastroesophageal reflux disease)     Hepatitis C     Intentional drug overdose ([facility] [address]     MRSA (methicillin resistant staph aureus) culture positive 09/25/2020    L5 disc asp    Polysubstance abuse ([facility] [address]     Wears glasses          Procedure Laterality Date    APPENDECTOMY      IR PERC ASPIRATION INTERVERT DISC  11/25/2020    IR PERC ASPIRATION INTERVERT DISC 11/25/2020 [facility] SPECIAL PROCEDURES    SHOULDER SURGERY Left 04/01/2014    UPPER GASTROINTESTINAL ENDOSCOPY  01/01/2014       Medications:  Discharge Medication List as of 11/21/2022  6:00 PM        CONTINUE these medications which have NOT CHANGED    Details   mirtazapine (REMERON) 15 MG tablet Take by mouthHistorical Med      mupirocin (BACTROBAN) 2 % ointment Apply topically 3 times daily, Topical, 3 TIMES DAILY Starting Thu 6/24/2021, Historical Med      traZODone (DESYREL) 100 MG tablet Take 100 mg by mouth nightlyHistorical Med      lidocaine-prilocaine (EMLA) 2.5-2.5 % cream Apply to the most painful wounds once daily at time of dressing change, as needed for pain -- leave on for 20 minutes and then wipe off before applying antibiotic cream., Disp-60 g, R-1, Normal      ibuprofen (ADVIL;MOTRIN) 800 MG tablet Take 800 mg by mouth every 8 hours as needed for Pain Historical Med      melatonin 3 MG TABS tablet Take 1 tablet by mouth nightly, Disp-30 tablet,R-3Print      busPIRone (BUSPAR) 15 MG tablet Take 15 mg by mouth 3 times daily as needed Indications: Feeling AnxiousHistorical Med      gabapentin (NEURONTIN) 600 MG tablet Take 600 mg by mouth 3 times daily. Historical Med      buPROPion (WELLBUTRIN SR) 100 MG extended release tablet Take 100 mg by mouth 2 times dailyHistorical Med      OLANZapine (ZYPREXA) 10 MG tablet Take 1 tablet by mouth nightly, Disp-30 tablet, R-0Normal               Review of Systems:  (2-9 systems needed)  Review of Systems   Constitutional:  Negative for chills and fever. HENT:  Negative for congestion. Respiratory:  Negative for cough, shortness of breath and wheezing.     Cardiovascular: Negative for chest pain. Gastrointestinal:  Negative for abdominal pain, diarrhea, nausea and vomiting. However he denies any abdominal pain, no nausea vomiting or diarrhea. Genitourinary:  Positive for testicular pain. Negative for difficulty urinating, dysuria, frequency, hematuria, penile swelling, scrotal swelling and urgency. Musculoskeletal:  Positive for back pain. Patient complains of left-sided back pain that goes into the left inguinal area and testicular area, he denies any urinary symptoms, no penile discharge however he is having discomfort in these areas. Skin:  Negative for color change. Neurological:  Negative for weakness, numbness and headaches. \"Positives and Pertinent negatives as per HPI\"    Physical Exam:  Physical Exam  Vitals and nursing note reviewed. Exam conducted with a chaperone present. Constitutional:       Appearance: He is well-developed. He is not diaphoretic. HENT:      Head: Normocephalic. Right Ear: External ear normal.      Left Ear: External ear normal.   Eyes:      General: No scleral icterus. Right eye: No discharge. Left eye: No discharge. Cardiovascular:      Rate and Rhythm: Normal rate. Pulmonary:      Effort: Pulmonary effort is normal. No respiratory distress. Breath sounds: Normal breath sounds. Abdominal:      Palpations: Abdomen is soft. Tenderness: There is left CVA tenderness. There is no right CVA tenderness. Comments: Abdomen is soft and nondistended. Bowel sounds are active, left-sided CVA tenderness but no ascites or rigidity. No rebound tenderness at McBurney's point. Genitourinary:     Penis: Normal and circumcised. Testes:         Right: Mass or tenderness not present. Cremasteric reflex is present. Left: Tenderness present. Mass not present. Cremasteric reflex is present.        Comments: Patient has reproducible tenderness in left testicle and inguinal area however there is no obvious swelling, lymphadenopathy or hernia palpable. Musculoskeletal:         General: Normal range of motion. Cervical back: Normal range of motion and neck supple. Skin:     General: Skin is warm. Capillary Refill: Capillary refill takes less than 2 seconds. Coloration: Skin is not pale. Neurological:      General: No focal deficit present. Mental Status: He is alert and oriented to person, place, and time. Psychiatric:      Comments: Patient appears to be quite eccentric, he is having a hard time sitting still, sometimes his words are unclear. I do have concerns of possible drug use however he denies this.        MEDICAL DECISION MAKING    Vitals:    Vitals:    11/21/22 1505   BP: (!) 148/91   Pulse: (!) 114   Resp: 18   Temp: 98.7 °F (37.1 °C)   TempSrc: Oral   SpO2: 98%   Weight: 158 lb 3.2 oz (71.8 kg)   Height: 5' 8\" (1.727 m)       LABS:  Labs Reviewed   URINE DRUG SCREEN - Abnormal; Notable for the following components:       Result Value    Amphetamine Screen, Urine POSITIVE (*)     Cannabinoid Scrn, Ur POSITIVE (*)     FENTANYL SCREEN, URINE POSITIVE (*)     All other components within normal limits   URINALYSIS WITH REFLEX TO CULTURE - Abnormal; Notable for the following components:    Blood, Urine TRACE-INTACT (*)     All other components within normal limits   CBC WITH AUTO DIFFERENTIAL - Abnormal; Notable for the following components:    Hemoglobin 12.7 (*)     Hematocrit 38.7 (*)     Neutrophils Absolute 7.8 (*)     All other components within normal limits   COMPREHENSIVE METABOLIC PANEL W/ REFLEX TO MG FOR LOW K - Abnormal; Notable for the following components:    Sodium 134 (*)     Chloride 97 (*)     Glucose 117 (*)     Creatinine 0.8 (*)     ALT 50 (*)     AST 43 (*)     All other components within normal limits   MICROSCOPIC URINALYSIS - Abnormal; Notable for the following components:    Mucus, UA Rare (*)     RBC, UA 5-10 (*)     Bacteria, UA Rare (*) All other components within normal limits   C. TRACHOMATIS / N. GONORRHOEAE, DNA        Remainder of labs reviewed and were negative at this time or not returned at the time of this note. RADIOLOGY:   Non-plain film images such as CT, Ultrasound and MRI are read by the radiologist. Alicia RINALDI, APRN - CNP have directly visualized the radiologic plain film image(s) with the below findings:      Interpretation per the Radiologist below, if available at the time of this note:    [address]   Final Result   Unremarkable testicular ultrasound with normal Doppler flow. MEDICAL DECISION MAKING / ED COURSE:    Because of high probability of sudden clinical deterioration of the patient's condition and risk of further deterioration, critical care time required my full attention to the patient's condition; which included chart data review, documentation, medication ordering, reviewing the patient's old records, reevaluation patient's cardiac, pulmonary and neurological status. Reevaluation of vital signs. Consultations with ED attending and admitting physician. Ordering, interpreting reviewing diagnostic testing. Therefore, I personally saw the patient and independently provided 22 minutes of non-concurrent critical care out of the total shared critical care time provided, direct attention to the patient's condition did not include time spent on procedures. PROCEDURES:   Procedures    None    Patient was given:  Medications - No data to display    Patient presents with chronic low back pain, he states that he woke up with the pain but is also having left testicular pain and pain in his left groin. He complains of dysuria, but denies any discharge, but tells me his wife recently had a fair that he found out about. Patient also was recently incarcerated. Patient is quite anxious and having a hard time sitting still, he denies any alcohol or drug use.      After evaluation and examination patient blood work, urinalysis, ultrasound were ordered. CBC shows no sepsis or anemia. Metabolic panel shows no electrolyte disturbances or renal failure. Liver functions are relatively normal.  However his drug screen is positive for amphetamines, cannabinoids and fentanyl which she denied all of using. Urine shows no infection. Testicular and scrotal ultrasound is unremarkable with normal flow. I do believe his back pain is more musculoskeletal in nature. I completed a structured, evidence-based clinical evaluation to screen for acute non-traumatic spinal emergencies. The patient has a normal detailed neurologic exam and a low red flag score. Patient denies any history of new numbness, weakness, incontinence of bowel or bladder, constipation, saddle anesthesia or paresthesias. I estimate there is LOW risk for ABDOMINAL AORTIC ANEURYSM, CAUDA EQUINA SYNDROME, EPIDURAL MASS LESION, OR CORD COMPRESSION. I have discussed with the patient my clinical impression and the result of an evidence-based clinical evaluation to screen for spinal epidural abscess and other spinal emergencies, as well as the risk of further testing and hospitalization. The evidence shows that the risk for an acute spinal emergency is less than 1%. Although the risk of an acute spinal emergency has not been completely eliminated, the risks of further testing likely exceed any potential benefit, and the patient agrees with not pursuing further emergent evaluation for causes of back pain at this time. Therefore, shared medical decision was made to the patient myself we agreed he be discharged home, I told him I did appreciate being lied to about the illicit substance use. He was discharged home with Naprosyn and Lidoderm patches to use and follow-up with the PCP in the next 2 to 3 days for reevaluation, he was also given various exercises he can perform to help alleviate some of the pain.   He was concerned about STD because his wife cheated, I told him that I would send those off and he can follow-up with those results in the next 24 to 48 hours on his MyChart or his PCP. The patient tolerated their visit well. I evaluated the patient. The physician was available for consultation as needed. The patient and / or the family were informed of the results of any tests, a time was given to answer questions, a plan was proposed and they agreed with plan. Patient verbalized understanding of discharge instructions and was discharged from the department in stable condition. I am the Primary Clinician of Record. CLINICAL IMPRESSION:  1. Acute exacerbation of chronic low back pain    2.  Positive urine drug screen        DISPOSITION Decision To Discharge 11/21/2022 05:49:04 PM      PATIENT REFERRED TO:  Timi Austin  [phone]    This is a family doctor referral, call in the morning and make an appointment for follow    [facility] [address]  [phone]  Go to   If symptoms worsen    DISCHARGE MEDICATIONS:  Discharge Medication List as of 11/21/2022  6:00 PM        START taking these medications    Details   naproxen (NAPROSYN) 500 MG tablet Take 1 tablet by mouth 2 times daily (with meals), Disp-30 tablet, R-0Normal      lidocaine (LIDODERM) 5 % Place 1 patch onto the skin daily 12 hours on, 12 hours off., Disp-30 patch, R-0Normal             DISCONTINUED MEDICATIONS:  Discharge Medication List as of 11/21/2022  6:00 PM                 (Please note the MDM and HPI sections of this note were completed with a voice recognition program.  Efforts were made to edit the dictations but occasionally words are mis-transcribed.)    Electronically signed, JORDY Lu CNP,           JORDY Lu CNP  11/22/22 2010

## 2022-11-21 NOTE — DISCHARGE INSTRUCTIONS
Today your drug screen is positive for amphetamines, marijuana and fentanyl, I highly recommend that you stop using illicit substances.

## 2022-11-22 ASSESSMENT — ENCOUNTER SYMPTOMS
BACK PAIN: 1
COLOR CHANGE: 0
ABDOMINAL PAIN: 0
VOMITING: 0
COUGH: 0
DIARRHEA: 0
NAUSEA: 0
WHEEZING: 0
SHORTNESS OF BREATH: 0

## 2022-11-28 ENCOUNTER — HOSPITAL ENCOUNTER (EMERGENCY)
Age: 42
Discharge: LEFT BEFORE DISCHARGE | End: 2022-11-29
Payer: COMMERCIAL

## 2022-11-28 ENCOUNTER — APPOINTMENT (OUTPATIENT)
Dept: GENERAL RADIOLOGY | Age: 42
End: 2022-11-28
Payer: COMMERCIAL

## 2022-11-28 VITALS
HEIGHT: 68 IN | DIASTOLIC BLOOD PRESSURE: 85 MMHG | OXYGEN SATURATION: 99 % | TEMPERATURE: 98.1 F | RESPIRATION RATE: 16 BRPM | HEART RATE: 76 BPM | WEIGHT: 153 LBS | SYSTOLIC BLOOD PRESSURE: 157 MMHG | BODY MASS INDEX: 23.19 KG/M2

## 2022-11-28 DIAGNOSIS — R07.89 CHEST TIGHTNESS: Primary | ICD-10-CM

## 2022-11-28 LAB
A/G RATIO: 1.3 (ref 1.1–2.2)
ALBUMIN SERPL-MCNC: 4 G/DL (ref 3.4–5)
ALP BLD-CCNC: 96 U/L (ref 40–129)
ALT SERPL-CCNC: 34 U/L (ref 10–40)
ANION GAP SERPL CALCULATED.3IONS-SCNC: 10 MMOL/L (ref 3–16)
AST SERPL-CCNC: 24 U/L (ref 15–37)
BASOPHILS ABSOLUTE: 0.1 K/UL (ref 0–0.2)
BASOPHILS RELATIVE PERCENT: 0.7 %
BILIRUB SERPL-MCNC: 0.3 MG/DL (ref 0–1)
BUN BLDV-MCNC: 16 MG/DL (ref 7–20)
CALCIUM SERPL-MCNC: 9.3 MG/DL (ref 8.3–10.6)
CHLORIDE BLD-SCNC: 98 MMOL/L (ref 99–110)
CO2: 26 MMOL/L (ref 21–32)
CREAT SERPL-MCNC: 1 MG/DL (ref 0.9–1.3)
EOSINOPHILS ABSOLUTE: 0.2 K/UL (ref 0–0.6)
EOSINOPHILS RELATIVE PERCENT: 1.2 %
GFR SERPL CREATININE-BSD FRML MDRD: >60 ML/MIN/{1.73_M2}
GLUCOSE BLD-MCNC: 110 MG/DL (ref 70–99)
HCT VFR BLD CALC: 35.9 % (ref 40.5–52.5)
HEMOGLOBIN: 12.1 G/DL (ref 13.5–17.5)
INFLUENZA A: NOT DETECTED
INFLUENZA B: NOT DETECTED
LYMPHOCYTES ABSOLUTE: 2.6 K/UL (ref 1–5.1)
LYMPHOCYTES RELATIVE PERCENT: 20.8 %
MCH RBC QN AUTO: 29.2 PG (ref 26–34)
MCHC RBC AUTO-ENTMCNC: 33.8 G/DL (ref 31–36)
MCV RBC AUTO: 86.5 FL (ref 80–100)
MONOCYTES ABSOLUTE: 0.8 K/UL (ref 0–1.3)
MONOCYTES RELATIVE PERCENT: 6.3 %
NEUTROPHILS ABSOLUTE: 9 K/UL (ref 1.7–7.7)
NEUTROPHILS RELATIVE PERCENT: 71 %
PDW BLD-RTO: 14.7 % (ref 12.4–15.4)
PLATELET # BLD: 477 K/UL (ref 135–450)
PMV BLD AUTO: 7.3 FL (ref 5–10.5)
POTASSIUM REFLEX MAGNESIUM: 3.6 MMOL/L (ref 3.5–5.1)
RBC # BLD: 4.15 M/UL (ref 4.2–5.9)
SARS-COV-2 RNA, RT PCR: NOT DETECTED
SODIUM BLD-SCNC: 134 MMOL/L (ref 136–145)
TOTAL PROTEIN: 7.2 G/DL (ref 6.4–8.2)
TROPONIN: <0.01 NG/ML
WBC # BLD: 12.7 K/UL (ref 4–11)

## 2022-11-28 PROCEDURE — 85025 COMPLETE CBC W/AUTO DIFF WBC: CPT

## 2022-11-28 PROCEDURE — 80053 COMPREHEN METABOLIC PANEL: CPT

## 2022-11-28 PROCEDURE — 36415 COLL VENOUS BLD VENIPUNCTURE: CPT

## 2022-11-28 PROCEDURE — 84484 ASSAY OF TROPONIN QUANT: CPT

## 2022-11-28 PROCEDURE — 4500000002 HC ER NO CHARGE

## 2022-11-28 PROCEDURE — 87636 SARSCOV2 & INF A&B AMP PRB: CPT

## 2022-11-28 PROCEDURE — 6370000000 HC RX 637 (ALT 250 FOR IP): Performed by: PHYSICIAN ASSISTANT

## 2022-11-28 PROCEDURE — 71045 X-RAY EXAM CHEST 1 VIEW: CPT

## 2022-11-28 PROCEDURE — 93005 ELECTROCARDIOGRAM TRACING: CPT | Performed by: PHYSICIAN ASSISTANT

## 2022-11-28 RX ORDER — ASPIRIN 325 MG
325 TABLET ORAL ONCE
Status: COMPLETED | OUTPATIENT
Start: 2022-11-28 | End: 2022-11-28

## 2022-11-28 RX ORDER — HYDROXYZINE HYDROCHLORIDE 25 MG/1
25 TABLET, FILM COATED ORAL ONCE
Status: COMPLETED | OUTPATIENT
Start: 2022-11-28 | End: 2022-11-28

## 2022-11-28 RX ADMIN — ASPIRIN 325 MG: 325 TABLET ORAL at 20:39

## 2022-11-28 RX ADMIN — HYDROXYZINE HYDROCHLORIDE 25 MG: 25 TABLET ORAL at 20:39

## 2022-11-28 ASSESSMENT — PAIN DESCRIPTION - DESCRIPTORS: DESCRIPTORS: SQUEEZING

## 2022-11-28 ASSESSMENT — PAIN DESCRIPTION - PAIN TYPE: TYPE: ACUTE PAIN

## 2022-11-28 ASSESSMENT — PAIN DESCRIPTION - LOCATION: LOCATION: BACK

## 2022-11-28 ASSESSMENT — PAIN - FUNCTIONAL ASSESSMENT: PAIN_FUNCTIONAL_ASSESSMENT: 0-10

## 2022-11-28 ASSESSMENT — PAIN SCALES - GENERAL: PAINLEVEL_OUTOF10: 5

## 2022-11-28 NOTE — ED TRIAGE NOTES
Chief Complaint   Patient presents with    Shortness of Breath     Pt c/o sob and chest tightness x 3 hours, cough and congestion

## 2022-11-29 LAB
EKG ATRIAL RATE: 76 BPM
EKG DIAGNOSIS: NORMAL
EKG P AXIS: 83 DEGREES
EKG P-R INTERVAL: 144 MS
EKG Q-T INTERVAL: 398 MS
EKG QRS DURATION: 94 MS
EKG QTC CALCULATION (BAZETT): 447 MS
EKG R AXIS: 8 DEGREES
EKG T AXIS: 64 DEGREES
EKG VENTRICULAR RATE: 76 BPM

## 2022-11-29 PROCEDURE — 93010 ELECTROCARDIOGRAM REPORT: CPT | Performed by: INTERNAL MEDICINE

## 2022-11-29 NOTE — ED NOTES
2034  12 lead ECG completed, given to Dr. Marquise Chiang for review     Charla Clemens  11/28/22 2035

## 2022-12-02 ASSESSMENT — ENCOUNTER SYMPTOMS
SHORTNESS OF BREATH: 1
EYE PAIN: 0
SORE THROAT: 0
BACK PAIN: 0
CHEST TIGHTNESS: 1
CONSTIPATION: 0
VOMITING: 0
ABDOMINAL PAIN: 0
NAUSEA: 0
DIARRHEA: 0
COUGH: 0

## 2022-12-02 NOTE — ED PROVIDER NOTES
[facility] ED  EMERGENCY DEPARTMENT ENCOUNTER        Pt Name: Chas Magaña  MRN: [id number]  [place] 1980  Date of evaluation: 11/28/2022  Provider: Jina Garcia PA-C  PCP: No primary care provider on file. Note Started: 1:48 PM EST12/2/2022       GIULIA. I have evaluated this patient. My supervising physician was available for consultation. Triage CHIEF COMPLAINT       Chief Complaint   Patient presents with    Shortness of Breath     Pt c/o sob and chest tightness x 3 hours, cough and congestion         HISTORY OF PRESENT ILLNESS   (Location/Symptom, Timing/Onset, Context/Setting, Quality, Duration, Modifying Factors, Severity)  Note limiting factors. Chief Complaint: chest pain, SOB    Chas Magaña is a 35 y.o. male who presents to the ED with complaint of chest tightness that occurred while he was urinating after him and his wife got into a disagreement. States it lasted for a couple of minutes and resolved. He had associated SOB. This has occurred in the past and believes it is related to anxiety. He also complains of of cough and congestion that has been present the past couple of days. Did not try taking anything for symptoms. Denies fever, chills, pain at this time. Nursing Notes were all reviewed and agreed with or any disagreements were addressed in the HPI. REVIEW OF SYSTEMS    (2-9 systems for level 4, 10 or more for level 5)     Review of Systems   Constitutional:  Negative for chills and fever. HENT:  Positive for congestion. Negative for ear pain and sore throat. Eyes:  Negative for pain and visual disturbance. Respiratory:  Positive for chest tightness and shortness of breath. Negative for cough. Cardiovascular:  Negative for chest pain and leg swelling. Gastrointestinal:  Negative for abdominal pain, constipation, diarrhea, nausea and vomiting. Genitourinary:  Negative for dysuria and hematuria.    Musculoskeletal: Negative for back pain and neck pain. Skin:  Negative for rash and wound. Neurological:  Negative for light-headedness and headaches.      PAST MEDICAL HISTORY     Past Medical History:   Diagnosis Date    Back pain     Cancer ([facility] [address]     Pancreatic cancer    Depression 12/09/2019    GERD (gastroesophageal reflux disease)     Hepatitis C     Intentional drug overdose ([facility]     MRSA (methicillin resistant staph aureus) culture positive 09/25/2020    L5 disc asp    Polysubstance abuse ([facility]     Wears glasses        SURGICAL HISTORY     Past Surgical History:   Procedure Laterality Date    APPENDECTOMY      IR PERC ASPIRATION INTERVERT [address]  11/25/2020    IR PERC ASPIRATION INTERVERT [address] 11/25/2020 [address] SPECIAL PROCEDURES    SHOULDER SURGERY Left 04/01/2014    UPPER GASTROINTESTINAL ENDOSCOPY  01/01/2014       CURRENTMEDICATIONS       Discharge Medication List as of 11/29/2022 12:21 AM        CONTINUE these medications which have NOT CHANGED    Details   naproxen (NAPROSYN) 500 MG tablet Take 1 tablet by mouth 2 times daily (with meals), Disp-30 tablet, R-0Normal      lidocaine (LIDODERM) 5 % Place 1 patch onto the skin daily 12 hours on, 12 hours off., Disp-30 patch, R-0Normal      mirtazapine (REMERON) 15 MG tablet Take by mouthHistorical Med      mupirocin (BACTROBAN) 2 % ointment Apply topically 3 times daily, Topical, 3 TIMES DAILY Starting Thu 6/24/2021, Historical Med      traZODone (DESYREL) 100 MG tablet Take 100 mg by mouth nightlyHistorical Med      lidocaine-prilocaine (EMLA) 2.5-2.5 % cream Apply to the most painful wounds once daily at time of dressing change, as needed for pain -- leave on for 20 minutes and then wipe off before applying antibiotic cream., Disp-60 g, R-1, Normal      ibuprofen (ADVIL;MOTRIN) 800 MG tablet Take 800 mg by mouth every 8 hours as needed for Pain Historical Med      melatonin 3 MG TABS tablet Take 1 tablet by mouth nightly, Disp-30 tablet,R-3Print      busPIRone (BUSPAR) 15 MG tablet Take 15 mg by mouth 3 times daily as needed Indications: Feeling AnxiousHistorical Med      gabapentin (NEURONTIN) 600 MG tablet Take 600 mg by mouth 3 times daily. Historical Med      buPROPion (WELLBUTRIN SR) 100 MG extended release tablet Take 100 mg by mouth 2 times dailyHistorical Med      OLANZapine (ZYPREXA) 10 MG tablet Take 1 tablet by mouth nightly, Disp-30 tablet, R-0Normal             ALLERGIES     Patient has no known allergies. FAMILYHISTORY       Family History   Problem Relation Age of Onset    Cancer Mother     Heart Disease Father         SOCIAL HISTORY       Social History     Socioeconomic History    Marital status: Legally      Spouse name: None    Number of children: None    Years of education: None    Highest education level: None   Tobacco Use    Smoking status: Every Day     Packs/day: 1.00     Types: Cigarettes    Smokeless tobacco: Never   Vaping Use    Vaping Use: Never used   Substance and Sexual Activity    Alcohol use: Not Currently    Drug use: Yes     Frequency: 7.0 times per week     Types: Marijuana [place] [employer])    Sexual activity: Yes     Partners: Female   Social History Narrative    ** Merged History Encounter **            SCREENINGS             PHYSICAL EXAM    (up to 7 for level 4, 8 or more for level 5)     ED Triage Vitals [11/28/22 1809]   BP Temp Temp Source Heart Rate Resp SpO2 Height Weight   (!) 157/85 98.1 °F (36.7 °C) Oral 76 16 99 % 5' 8\" (1.727 m) 153 lb (69.4 kg)       Physical Exam  Constitutional:       General: He is not in acute distress. Appearance: Normal appearance. He is not ill-appearing, toxic-appearing or diaphoretic. HENT:      Head: Normocephalic and atraumatic. Right Ear: External ear normal.      Left Ear: External ear normal.      Nose: Nose normal.   Eyes:      General:         Right eye: No discharge. Left eye: No discharge. Cardiovascular:      Rate and Rhythm: Normal rate and regular rhythm.       Pulses: Normal pulses. Heart sounds: Normal heart sounds. No murmur heard. No gallop. Pulmonary:      Effort: Pulmonary effort is normal. No respiratory distress. Breath sounds: Normal breath sounds. No stridor. No wheezing, rhonchi or rales. Musculoskeletal:         General: Normal range of motion. Cervical back: Normal range of motion. Skin:     General: Skin is warm and dry. Neurological:      General: No focal deficit present. Mental Status: He is alert and oriented to person, place, and time. Psychiatric:         Mood and Affect: Mood normal.         Behavior: Behavior normal.       DIAGNOSTIC RESULTS   LABS:    Labs Reviewed   CBC WITH AUTO DIFFERENTIAL - Abnormal; Notable for the following components:       Result Value    WBC 12.7 (*)     RBC 4.15 (*)     Hemoglobin 12.1 (*)     Hematocrit 35.9 (*)     Platelets 739 (*)     Neutrophils Absolute 9.0 (*)     All other components within normal limits   COMPREHENSIVE METABOLIC PANEL W/ REFLEX TO MG FOR LOW K - Abnormal; Notable for the following components:    Sodium 134 (*)     Chloride 98 (*)     Glucose 110 (*)     All other components within normal limits   COVID-19 & INFLUENZA COMBO   TROPONIN       When ordered, only abnormal lab results are displayed. All other labs were within normal range or not returned as of this dictation. EKG: When ordered, EKG's are interpreted by the Emergency Department Physician in the absence of a cardiologist.  Please see their note for interpretation of EKG. RADIOLOGY:   Non-plain film images such as CT, Ultrasound and MRI are read by the radiologist. Plain radiographic images are visualized and preliminarily interpreted by the  ED Provider with the below findings:        Interpretation perthe Radiologist below, if available at the time of this note:    XR CHEST PORTABLE   Final Result   Partial obscuration of the apex of the right due to the positioning. Recommend short-term follow-up. Questionable small nipple shadow projecting along the left base laterally. Recommend a follow-up PA and lateral chest.      Mild hyperinflation with no obvious acute abnormality. XR CHEST PORTABLE    Result Date: 11/28/2022  EXAMINATION: ONE XRAY VIEW OF THE CHEST 11/28/2022 8:27 pm COMPARISON: None. HISTORY: ORDERING SYSTEM PROVIDED HISTORY: chest pain, SOB TECHNOLOGIST PROVIDED HISTORY: Reason for exam:->chest pain, SOB Reason for Exam: chest pain FINDINGS: The heart is normal.  The pulmonary vessels are normal.  The lungs are mildly hyperinflated. The right apex is partially obscured due to positioning. There is mild scoliosis. The bones are intact. There is small rounded nodule projecting along left lower chest laterally. Partial obscuration of the apex of the right due to the positioning. Recommend short-term follow-up. Questionable small nipple shadow projecting along the left base laterally. Recommend a follow-up PA and lateral chest. Mild hyperinflation with no obvious acute abnormality. PROCEDURES   Unless otherwise noted below, none     Procedures        CONSULTS:  None      EMERGENCY DEPARTMENT COURSE and DIFFERENTIAL DIAGNOSIS/MDM:   Vitals:    Vitals:    11/28/22 1809   BP: (!) 157/85   Pulse: 76   Resp: 16   Temp: 98.1 °F (36.7 °C)   TempSrc: Oral   SpO2: 99%   Weight: 153 lb (69.4 kg)   Height: 5' 8\" (1.727 m)       Patient was given the following medications:  Medications   hydrOXYzine HCl (ATARAX) tablet 25 mg (25 mg Oral Given 11/28/22 2039)   aspirin tablet 325 mg (325 mg Oral Given 11/28/22 2039)         Is this patient to be included in the SEP-1 Core Measure due to severe sepsis or septic shock? No   Exclusion criteria - the patient is NOT to be included for SEP-1 Core Measure due to: Infection is not suspected    This is a 35y.o. year old, well appearing male who presents to the ED with complaint of chest tightness that began earlier today.    Vitals upon arrival show mildly htn, otherwise wnl. Physical Exam shows no acute abnormality. Blood work was done and shows:   -mild leukocytosis of 12.7  -mild anemia of 12.1  -mild hyponatremia   -troponin wnl  Covid/Flu: negative  EKG: interpreted by attending, sinus rhythm with PACs    Imaging was reviewed and interpreted by radiologist as above showing:   -partial obscuration of the apex of the right due to the positioning with questionable small nipple shadow projecting along the left base laterally. A PA film was ordered, however when XRAY went to grab patient he had eloped from emergency department. Attempted to call patient but he did not answer. Disposition: Elopement       I am the Primary Clinician of Record. FINAL IMPRESSION      1. Chest tightness          DISPOSITION/PLAN   DISPOSITION Lwbs After Rn Triage 11/29/2022 12:21:18 AM      PATIENT REFERREDTO:  No follow-up provider specified.     DISCHARGE MEDICATIONS:  Discharge Medication List as of 11/29/2022 12:21 AM          DISCONTINUED MEDICATIONS:  Discharge Medication List as of 11/29/2022 12:21 AM                 (Please note that portions ofthis note were completed with a voice recognition program.  Efforts were made to edit the dictations but occasionally words are mis-transcribed.)    Marti Mena PA-C (electronically signed)              Marti Mena PA-C  12/02/22 5911 no

## 2023-08-12 ENCOUNTER — HOSPITAL ENCOUNTER (EMERGENCY)
Age: 43
Discharge: HOME | End: 2023-08-12
Attending: EMERGENCY MEDICINE
Payer: COMMERCIAL

## 2023-08-12 ENCOUNTER — APPOINTMENT (OUTPATIENT)
Dept: GENERAL RADIOLOGY | Age: 43
End: 2023-08-12
Payer: COMMERCIAL

## 2023-08-12 VITALS
HEART RATE: 86 BPM | WEIGHT: 160 LBS | SYSTOLIC BLOOD PRESSURE: 120 MMHG | RESPIRATION RATE: 14 BRPM | BODY MASS INDEX: 24.25 KG/M2 | TEMPERATURE: 98.2 F | HEIGHT: 68 IN | DIASTOLIC BLOOD PRESSURE: 70 MMHG | OXYGEN SATURATION: 100 %

## 2023-08-12 DIAGNOSIS — S92.354A CLOSED NONDISPLACED FRACTURE OF FIFTH METATARSAL BONE OF RIGHT FOOT, INITIAL ENCOUNTER: Primary | ICD-10-CM

## 2023-08-12 PROCEDURE — 99283 EMERGENCY DEPT VISIT LOW MDM: CPT

## 2023-08-12 PROCEDURE — 29125 APPL SHORT ARM SPLINT STATIC: CPT

## 2023-08-12 PROCEDURE — 29515 APPLICATION SHORT LEG SPLINT: CPT

## 2023-08-12 PROCEDURE — 73630 X-RAY EXAM OF FOOT: CPT

## 2023-08-12 ASSESSMENT — PAIN DESCRIPTION - LOCATION: LOCATION: FOOT

## 2023-08-12 ASSESSMENT — PAIN DESCRIPTION - DESCRIPTORS: DESCRIPTORS: ACHING

## 2023-08-12 ASSESSMENT — PAIN DESCRIPTION - ORIENTATION: ORIENTATION: RIGHT

## 2023-08-12 ASSESSMENT — PAIN DESCRIPTION - FREQUENCY: FREQUENCY: CONTINUOUS

## 2023-08-12 ASSESSMENT — PAIN SCALES - GENERAL: PAINLEVEL_OUTOF10: 6

## 2023-08-12 ASSESSMENT — PAIN - FUNCTIONAL ASSESSMENT
PAIN_FUNCTIONAL_ASSESSMENT: PREVENTS OR INTERFERES SOME ACTIVE ACTIVITIES AND ADLS
PAIN_FUNCTIONAL_ASSESSMENT: 0-10

## 2023-08-12 ASSESSMENT — PAIN DESCRIPTION - PAIN TYPE: TYPE: ACUTE PAIN

## 2023-08-12 ASSESSMENT — LIFESTYLE VARIABLES
HOW MANY STANDARD DRINKS CONTAINING ALCOHOL DO YOU HAVE ON A TYPICAL DAY: PATIENT DOES NOT DRINK
HOW OFTEN DO YOU HAVE A DRINK CONTAINING ALCOHOL: NEVER

## 2023-08-12 ASSESSMENT — PAIN DESCRIPTION - ONSET: ONSET: SUDDEN

## 2023-08-12 NOTE — ED NOTES
States he stepped out of the shower yesterday and rolled his right foot under. c/o pain to the right foot since that time.       Callum Rosado RN  08/12/23 [id number]

## 2023-08-12 NOTE — DISCHARGE INSTRUCTIONS
Take Tylenol 650 mg every 4 hours  Take ibuprofen 600 mg 3 times a day  Keep your cast high and dry until you are seen by the orthopedic physician  Use your crutches without weightbearing

## 2023-08-12 NOTE — ED PROVIDER NOTES
[facility]      Pt Name: Hoa Villalta  MRN: [id number]  [address] [facility] 1980  Date of evaluation: 8/12/2023  Provider: Tommy Anderson MD    CHIEF COMPLAINT     No chief complaint on file. HISTORY OF PRESENT ILLNESS   (Location/Symptom, Timing/Onset, Context/Setting, Quality, Duration, Modifying Factors, Severity)  Note limiting factors. Hoa Villalta is a 38 y.o. male who presents to the emergency department     Patient had acute inversion injury last night while walking he is not tender over the fifth metatarsal unable to bear weight    The history is provided by the patient. Nursing Notes were reviewed. REVIEW OF SYSTEMS    (2-9 systems for level 4, 10 or more for level 5)     Review of Systems   Constitutional:  Positive for activity change. Musculoskeletal:  Positive for arthralgias and gait problem. All other systems reviewed and are negative. Except as noted above the remainder of the review of systems was reviewed and negative.        PAST MEDICAL HISTORY     Past Medical History:   Diagnosis Date    Back pain     Cancer ([address])     Pancreatic cancer    Depression 12/09/2019    GERD (gastroesophageal reflux disease)     Hepatitis C     Intentional drug overdose ([facility])     MRSA (methicillin resistant staph aureus) culture positive 09/25/2020    L5 disc asp    Polysubstance abuse ([address])     Wears glasses          SURGICAL HISTORY       Past Surgical History:   Procedure Laterality Date    APPENDECTOMY      IR PERC ASPIRATION INTERVERT [facility]  11/25/2020    IR PERC ASPIRATION INTERVERT DISC 11/25/2020 [id number] SPECIAL PROCEDURES    SHOULDER SURGERY Left 04/01/2014    UPPER GASTROINTESTINAL ENDOSCOPY  01/01/2014         CURRENT MEDICATIONS       Previous Medications    BUPROPION (WELLBUTRIN SR) 100 MG EXTENDED RELEASE TABLET    Take 100 mg by mouth 2 times daily    BUSPIRONE (BUSPAR) 15 MG TABLET    Take 15 mg by mouth 3 times daily as

## 2023-08-12 NOTE — ED NOTES
AVS provided and reviewed with the patient. The patient verbalized understanding of care at home, follow up care, and emergent symptoms to return for. No questions or concerns verbalized at this time. The patient is alert, oriented, stable, and ambulatory out of the department at the time of discharge.          Kirsty Uriarte RN  08/12/23 8211

## 2023-08-15 ENCOUNTER — TELEPHONE (OUTPATIENT)
Dept: ORTHOPEDIC SURGERY | Age: 43
End: 2023-08-15

## 2023-08-17 ENCOUNTER — HOSPITAL ENCOUNTER (EMERGENCY)
Age: 43
Discharge: HOME | End: 2023-08-17
Payer: COMMERCIAL

## 2023-08-17 ENCOUNTER — APPOINTMENT (OUTPATIENT)
Dept: GENERAL RADIOLOGY | Age: 43
End: 2023-08-17
Payer: COMMERCIAL

## 2023-08-17 VITALS
RESPIRATION RATE: 18 BRPM | HEART RATE: 98 BPM | TEMPERATURE: 98.3 F | SYSTOLIC BLOOD PRESSURE: 143 MMHG | OXYGEN SATURATION: 99 % | DIASTOLIC BLOOD PRESSURE: 91 MMHG

## 2023-08-17 DIAGNOSIS — S92.351D CLOSED DISPLACED FRACTURE OF FIFTH METATARSAL BONE OF RIGHT FOOT WITH ROUTINE HEALING, SUBSEQUENT ENCOUNTER: Primary | ICD-10-CM

## 2023-08-17 PROCEDURE — 73630 X-RAY EXAM OF FOOT: CPT

## 2023-08-17 PROCEDURE — 99283 EMERGENCY DEPT VISIT LOW MDM: CPT

## 2023-08-17 PROCEDURE — 6370000000 HC RX 637 (ALT 250 FOR IP): Performed by: PHYSICIAN ASSISTANT

## 2023-08-17 RX ORDER — ACETAMINOPHEN 500 MG
1000 TABLET ORAL ONCE
Status: COMPLETED | OUTPATIENT
Start: 2023-08-17 | End: 2023-08-17

## 2023-08-17 RX ORDER — IBUPROFEN 600 MG/1
600 TABLET ORAL ONCE
Status: COMPLETED | OUTPATIENT
Start: 2023-08-17 | End: 2023-08-17

## 2023-08-17 RX ADMIN — ACETAMINOPHEN 1000 MG: 500 TABLET ORAL at 21:37

## 2023-08-17 RX ADMIN — IBUPROFEN 600 MG: 600 TABLET, FILM COATED ORAL at 21:37

## 2023-08-18 NOTE — ED PROVIDER NOTES
[address] [place] [address] ED  EMERGENCY DEPARTMENT ENCOUNTER        Pt Name: Jia Solorio  MRN: [id number]  [address] [facility] 1980  Date of evaluation: 8/17/2023  Provider: Berry Nguyễn PA-C  PCP: No primary care provider on file. Note Started: 8:43 PM EDT 8/17/23      GIULIA. I have evaluated this patient. CHIEF COMPLAINT       Chief Complaint   Patient presents with    Foot Injury     Pt arrives with reported broken bones to R ankle and foot. Needs penitentiary clearance, penitentiary wants him seen. HISTORY OF PRESENT ILLNESS: 1 or more Elements     History From: Patient and     Jia Solorio is a 38 y.o. male who presents to the emergency department for clearance for penitentiary. The patient presenting with complaint pain lateral right foot. Patient apparently sustained inversion injury on or about 8/12/2023. X-ray at that time revealing a proximal fifth metatarsal fracture. Patient presents with a Aircast and Ace wrap. Referral to orthopedic. States he missed the phone call today. Hopes to follow with orthopedics. Nursing Notes were all reviewed and agreed with or any disagreements were addressed in the HPI. REVIEW OF SYSTEMS :      Review of Systems    Positives and Pertinent negatives as per HPI.      SURGICAL HISTORY     Past Surgical History:   Procedure Laterality Date    APPENDECTOMY      IR PERC ASPIRATION INTERVERT [facility]  11/25/2020    IR PERC ASPIRATION INTERVERT [facility] 11/25/2020 [facility] SPECIAL PROCEDURES    SHOULDER SURGERY Left 04/01/2014    UPPER GASTROINTESTINAL ENDOSCOPY  01/01/2014       CURRENTMEDICATIONS       Discharge Medication List as of 8/17/2023  9:32 PM        CONTINUE these medications which have NOT CHANGED    Details   mirtazapine (REMERON) 15 MG tablet Take by mouthHistorical Med      traZODone (DESYREL) 100 MG tablet Take 100 mg by mouth nightlyHistorical Med      busPIRone (BUSPAR) 15 MG tablet Take 15 mg by mouth 3 times daily as needed Indications: swelling and pain. Skin:     General: Skin is warm and dry. Neurological:      General: No focal deficit present. Mental Status: He is alert and oriented to person, place, and time. Mental status is at baseline. Psychiatric:         Mood and Affect: Mood normal.         Behavior: Behavior normal.         Thought Content: Thought content normal.         Judgment: Judgment normal.       DIAGNOSTIC RESULTS   LABS:    Labs Reviewed - No data to display    When ordered only abnormal lab results are displayed. All other labs were within normal range or not returned as of this dictation. EKG: When ordered, EKG's are interpreted by the Emergency Department Physician in the absence of a cardiologist.  Please see their note for interpretation of EKG. RADIOLOGY:   Non-plain film images such as CT, Ultrasound and MRI are read by the radiologist. Plain radiographic images are visualized and preliminarily interpreted by the ED Provider with the below findings:    X-ray the patient's right foot reemerges the known fracture with some early callus formation. Not widening. Interpretation per the Radiologist below, if available at the time of this note:    XR FOOT RIGHT (MIN 3 VIEWS)   Final Result   Stable fracture involving the base of the 5th metatarsal           XR FOOT RIGHT (MIN 3 VIEWS)    Result Date: 8/12/2023  EXAMINATION: 3 XRAY VIEWS OF THE RIGHT FOOT 8/12/2023 12:45 pm COMPARISON: None. HISTORY: ORDERING SYSTEM PROVIDED HISTORY: rolled last night TECHNOLOGIST PROVIDED HISTORY: Reason for exam:->rolled last night Reason for Exam: foot pain FINDINGS: Nondisplaced fracture 5th metatarsal proximally. No other fractures. No dislocation. Nondisplaced fracture proximal 5th metatarsal       No results found. PROCEDURES     Ports Ace wrap applied over foot and ankle. Postop shoe applied at 9-1/2 size shoe.      Procedures    CRITICAL CARE TIME (.cctime)       PAST MEDICAL HISTORY      has a past

## 2023-08-18 NOTE — DISCHARGE INSTRUCTIONS
X-ray shows healing right fifth metatarsal fracture. Ace wrap applied. Postop shoe provided. Contact orthopedic for an appointment on Monday. I do recommend Tylenol 1000 mg 3 times a day for pain control.

## 2023-08-21 ENCOUNTER — TELEPHONE (OUTPATIENT)
Dept: ORTHOPEDIC SURGERY | Age: 43
End: 2023-08-21